# Patient Record
Sex: MALE | URBAN - METROPOLITAN AREA
[De-identification: names, ages, dates, MRNs, and addresses within clinical notes are randomized per-mention and may not be internally consistent; named-entity substitution may affect disease eponyms.]

---

## 2020-08-27 ENCOUNTER — APPOINTMENT (RX ONLY)
Dept: URBAN - METROPOLITAN AREA CLINIC 11 | Facility: CLINIC | Age: 50
Setting detail: DERMATOLOGY
End: 2020-08-27

## 2020-08-27 DIAGNOSIS — L40.3 PUSTULOSIS PALMARIS ET PLANTARIS: ICD-10-CM

## 2020-08-27 PROCEDURE — ? KOH PREP

## 2020-08-27 PROCEDURE — ? PRESCRIPTION

## 2020-08-27 PROCEDURE — ? COUNSELING

## 2020-08-27 PROCEDURE — 87220 TISSUE EXAM FOR FUNGI: CPT

## 2020-08-27 PROCEDURE — 99202 OFFICE O/P NEW SF 15 MIN: CPT

## 2020-08-27 RX ORDER — CLOBETASOL PROPIONATE 0.5 MG/G
CREAM TOPICAL BID
Qty: 1 | Refills: 3 | Status: ERX | COMMUNITY
Start: 2020-08-27

## 2020-08-27 RX ADMIN — CLOBETASOL PROPIONATE: 0.5 CREAM TOPICAL at 00:00

## 2020-08-27 ASSESSMENT — LOCATION DETAILED DESCRIPTION DERM
LOCATION DETAILED: RIGHT PLANTAR FOREFOOT OVERLYING 2ND METATARSAL
LOCATION DETAILED: RIGHT PLANTAR FOREFOOT OVERLYING 3RD METATARSAL
LOCATION DETAILED: RIGHT PLANTAR FOREFOOT OVERLYING 2ND METATARSAL
LOCATION DETAILED: RIGHT INSTEP

## 2020-08-27 ASSESSMENT — LOCATION ZONE DERM
LOCATION ZONE: FEET
LOCATION ZONE: FEET

## 2020-08-27 ASSESSMENT — PGA PSORIASIS: PGA PSORIASIS 2020: MODERATE

## 2020-08-27 ASSESSMENT — LOCATION SIMPLE DESCRIPTION DERM
LOCATION SIMPLE: RIGHT PLANTAR SURFACE
LOCATION SIMPLE: RIGHT PLANTAR SURFACE

## 2021-07-11 ENCOUNTER — HOSPITAL ENCOUNTER (EMERGENCY)
Facility: HOSPITAL | Age: 51
Discharge: HOME | End: 2021-07-11
Attending: EMERGENCY MEDICINE
Payer: MEDICAID

## 2021-07-11 VITALS
DIASTOLIC BLOOD PRESSURE: 79 MMHG | RESPIRATION RATE: 18 BRPM | SYSTOLIC BLOOD PRESSURE: 118 MMHG | BODY MASS INDEX: 20.99 KG/M2 | HEART RATE: 99 BPM | OXYGEN SATURATION: 97 % | WEIGHT: 155 LBS | HEIGHT: 72 IN | TEMPERATURE: 98 F

## 2021-07-11 DIAGNOSIS — R56.9 SEIZURE: Primary | ICD-10-CM

## 2021-07-11 DIAGNOSIS — Z91.148 NONCOMPLIANCE WITH MEDICATION TREATMENT DUE TO UNDERUSE OF MEDICATION: ICD-10-CM

## 2021-07-11 LAB — PHENYTOIN SERPL-MCNC: <0.1 UG/ML (ref 10–20)

## 2021-07-11 PROCEDURE — 96365 THER/PROPH/DIAG IV INF INIT: CPT

## 2021-07-11 PROCEDURE — 80177 DRUG SCRN QUAN LEVETIRACETAM: CPT | Performed by: EMERGENCY MEDICINE

## 2021-07-11 PROCEDURE — 80185 ASSAY OF PHENYTOIN TOTAL: CPT | Performed by: EMERGENCY MEDICINE

## 2021-07-11 PROCEDURE — 0031A HC IMMUNIZ ADMIN, SARS-COV-2 COVID-19 VACC, 5X10VP/0.5ML: CPT | Performed by: EMERGENCY MEDICINE

## 2021-07-11 PROCEDURE — 91303 PHARM REV CODE 636 W HCPCS: CPT | Performed by: EMERGENCY MEDICINE

## 2021-07-11 PROCEDURE — 63600175 PHARM REV CODE 636 W HCPCS: Performed by: EMERGENCY MEDICINE

## 2021-07-11 PROCEDURE — 96372 THER/PROPH/DIAG INJ SC/IM: CPT

## 2021-07-11 PROCEDURE — 99284 EMERGENCY DEPT VISIT MOD MDM: CPT | Mod: 25

## 2021-07-11 RX ORDER — PHENYTOIN 125 MG/5ML
352 SUSPENSION ORAL
Status: DISCONTINUED | OUTPATIENT
Start: 2021-07-11 | End: 2021-07-11

## 2021-07-11 RX ORDER — LEVETIRACETAM 500 MG/1
500 TABLET ORAL 2 TIMES DAILY
COMMUNITY
End: 2021-07-11 | Stop reason: SDUPTHER

## 2021-07-11 RX ORDER — LEVETIRACETAM 10 MG/ML
1000 INJECTION INTRAVASCULAR ONCE
Status: COMPLETED | OUTPATIENT
Start: 2021-07-11 | End: 2021-07-11

## 2021-07-11 RX ORDER — LEVETIRACETAM 10 MG/ML
1000 INJECTION INTRAVASCULAR ONCE
Status: DISCONTINUED | OUTPATIENT
Start: 2021-07-11 | End: 2021-07-11

## 2021-07-11 RX ORDER — LEVETIRACETAM 500 MG/1
500 TABLET ORAL 2 TIMES DAILY
Qty: 180 TABLET | Refills: 0 | Status: SHIPPED | OUTPATIENT
Start: 2021-07-11 | End: 2021-10-07

## 2021-07-11 RX ADMIN — LEVETIRACETAM INJECTION 1000 MG: 10 INJECTION INTRAVENOUS at 04:07

## 2021-07-11 RX ADMIN — JNJ-78436735 0.5 ML: 50000000000 SUSPENSION INTRAMUSCULAR at 05:07

## 2021-07-12 ENCOUNTER — TELEPHONE (OUTPATIENT)
Dept: NEUROLOGY | Facility: CLINIC | Age: 51
End: 2021-07-12

## 2021-07-13 ENCOUNTER — TELEPHONE (OUTPATIENT)
Dept: NEUROLOGY | Facility: CLINIC | Age: 51
End: 2021-07-13

## 2021-07-14 LAB — LEVETIRACETAM SERPL-MCNC: <1 UG/ML (ref 3–60)

## 2021-09-16 ENCOUNTER — TELEPHONE (OUTPATIENT)
Dept: NEUROLOGY | Facility: CLINIC | Age: 51
End: 2021-09-16

## 2021-10-07 ENCOUNTER — LAB VISIT (OUTPATIENT)
Dept: LAB | Facility: HOSPITAL | Age: 51
End: 2021-10-07
Attending: PSYCHIATRY & NEUROLOGY
Payer: MEDICAID

## 2021-10-07 ENCOUNTER — OFFICE VISIT (OUTPATIENT)
Dept: NEUROLOGY | Facility: CLINIC | Age: 51
End: 2021-10-07
Payer: MEDICAID

## 2021-10-07 VITALS
WEIGHT: 154.56 LBS | BODY MASS INDEX: 20.94 KG/M2 | HEART RATE: 99 BPM | SYSTOLIC BLOOD PRESSURE: 119 MMHG | HEIGHT: 72 IN | DIASTOLIC BLOOD PRESSURE: 81 MMHG

## 2021-10-07 DIAGNOSIS — G40.019 LOCALIZATION-RELATED (FOCAL) (PARTIAL) IDIOPATHIC EPILEPSY AND EPILEPTIC SYNDROMES WITH SEIZURES OF LOCALIZED ONSET, INTRACTABLE, WITHOUT STATUS EPILEPTICUS: ICD-10-CM

## 2021-10-07 DIAGNOSIS — Z91.148 POOR COMPLIANCE WITH MEDICATION: ICD-10-CM

## 2021-10-07 DIAGNOSIS — G40.319 GENERALIZED EPILEPSY, INTRACTABLE: Primary | ICD-10-CM

## 2021-10-07 PROCEDURE — 99204 PR OFFICE/OUTPT VISIT, NEW, LEVL IV, 45-59 MIN: ICD-10-PCS | Mod: S$PBB,,, | Performed by: PSYCHIATRY & NEUROLOGY

## 2021-10-07 PROCEDURE — 99213 OFFICE O/P EST LOW 20 MIN: CPT | Mod: PBBFAC | Performed by: PSYCHIATRY & NEUROLOGY

## 2021-10-07 PROCEDURE — 99204 OFFICE O/P NEW MOD 45 MIN: CPT | Mod: S$PBB,,, | Performed by: PSYCHIATRY & NEUROLOGY

## 2021-10-07 PROCEDURE — 99999 PR PBB SHADOW E&M-EST. PATIENT-LVL III: ICD-10-PCS | Mod: PBBFAC,,, | Performed by: PSYCHIATRY & NEUROLOGY

## 2021-10-07 PROCEDURE — 36415 COLL VENOUS BLD VENIPUNCTURE: CPT | Performed by: PSYCHIATRY & NEUROLOGY

## 2021-10-07 PROCEDURE — 80177 DRUG SCRN QUAN LEVETIRACETAM: CPT | Performed by: PSYCHIATRY & NEUROLOGY

## 2021-10-07 PROCEDURE — 99999 PR PBB SHADOW E&M-EST. PATIENT-LVL III: CPT | Mod: PBBFAC,,, | Performed by: PSYCHIATRY & NEUROLOGY

## 2021-10-07 RX ORDER — LEVETIRACETAM 750 MG/1
1500 TABLET ORAL 2 TIMES DAILY
Qty: 360 TABLET | Refills: 3 | Status: SHIPPED | OUTPATIENT
Start: 2021-10-07 | End: 2022-02-11 | Stop reason: SDUPTHER

## 2021-10-11 LAB — LEVETIRACETAM SERPL-MCNC: 13.2 UG/ML (ref 3–60)

## 2021-12-30 ENCOUNTER — IMMUNIZATION (OUTPATIENT)
Dept: INTERNAL MEDICINE | Facility: CLINIC | Age: 51
End: 2021-12-30
Payer: MEDICAID

## 2021-12-30 DIAGNOSIS — Z23 NEED FOR VACCINATION: Primary | ICD-10-CM

## 2021-12-30 PROCEDURE — 91303 COVID-19,VECTOR-NR,RS-AD26,PF,0.5 ML DOSE VACCINE (JANSSEN): CPT | Mod: PBBFAC

## 2021-12-30 PROCEDURE — 0034A COVID-19,VECTOR-NR,RS-AD26,PF,0.5 ML DOSE VACCINE (JANSSEN): CPT | Mod: PBBFAC,CV19

## 2022-01-18 ENCOUNTER — TELEPHONE (OUTPATIENT)
Dept: NEUROLOGY | Facility: CLINIC | Age: 52
End: 2022-01-18
Payer: MEDICAID

## 2022-01-18 NOTE — TELEPHONE ENCOUNTER
----- Message from Becky Hui MD PhD sent at 1/18/2022  3:24 PM CST -----  Contact: Patient  Please schedule.     Becky Dumas     ----- Message -----  From: Radha Andrade  Sent: 1/18/2022  12:32 PM CST  To: Korina Pena Staff    Type:   Appointment Request      Name of Caller:Patient   When is the first available appointment?no appointment generated  Patient stated message already send and no response   Would the patient rather a call back or a response via [facility]? Call back   Best Call Back Number:[phone] or [phone]  Additional Information: Please assist     Patient stated on waiting on call for appointment

## 2022-02-11 ENCOUNTER — OFFICE VISIT (OUTPATIENT)
Dept: NEUROLOGY | Facility: CLINIC | Age: 52
End: 2022-02-11
Payer: MEDICAID

## 2022-02-11 ENCOUNTER — LAB VISIT (OUTPATIENT)
Dept: LAB | Facility: HOSPITAL | Age: 52
End: 2022-02-11
Attending: PSYCHIATRY & NEUROLOGY
Payer: MEDICAID

## 2022-02-11 VITALS
WEIGHT: 157.75 LBS | HEART RATE: 95 BPM | SYSTOLIC BLOOD PRESSURE: 131 MMHG | BODY MASS INDEX: 21.37 KG/M2 | DIASTOLIC BLOOD PRESSURE: 93 MMHG | HEIGHT: 72 IN

## 2022-02-11 DIAGNOSIS — G40.319 GENERALIZED EPILEPSY, INTRACTABLE: ICD-10-CM

## 2022-02-11 DIAGNOSIS — G40.319 GENERALIZED EPILEPSY, INTRACTABLE: Primary | ICD-10-CM

## 2022-02-11 DIAGNOSIS — Z91.148 POOR COMPLIANCE WITH MEDICATION: ICD-10-CM

## 2022-02-11 LAB
ERYTHROCYTE [DISTWIDTH] IN BLOOD BY AUTOMATED COUNT: 13.2 % (ref 11.5–14.5)
HCT VFR BLD AUTO: 45.2 % (ref 40–54)
HGB BLD-MCNC: 14.9 G/DL (ref 14–18)
MCH RBC QN AUTO: 33.3 PG (ref 27–31)
MCHC RBC AUTO-ENTMCNC: 33 G/DL (ref 32–36)
MCV RBC AUTO: 101 FL (ref 82–98)
PLATELET # BLD AUTO: 231 K/UL (ref 150–450)
PMV BLD AUTO: 9.8 FL (ref 9.2–12.9)
RBC # BLD AUTO: 4.47 M/UL (ref 4.6–6.2)
WBC # BLD AUTO: 5.85 K/UL (ref 3.9–12.7)

## 2022-02-11 PROCEDURE — 3080F PR MOST RECENT DIASTOLIC BLOOD PRESSURE >= 90 MM HG: ICD-10-PCS | Mod: CPTII,,, | Performed by: PSYCHIATRY & NEUROLOGY

## 2022-02-11 PROCEDURE — 99213 OFFICE O/P EST LOW 20 MIN: CPT | Mod: PBBFAC | Performed by: PSYCHIATRY & NEUROLOGY

## 2022-02-11 PROCEDURE — 3008F BODY MASS INDEX DOCD: CPT | Mod: CPTII,,, | Performed by: PSYCHIATRY & NEUROLOGY

## 2022-02-11 PROCEDURE — 99999 PR PBB SHADOW E&M-EST. PATIENT-LVL III: CPT | Mod: PBBFAC,,, | Performed by: PSYCHIATRY & NEUROLOGY

## 2022-02-11 PROCEDURE — 3075F PR MOST RECENT SYSTOLIC BLOOD PRESS GE 130-139MM HG: ICD-10-PCS | Mod: CPTII,,, | Performed by: PSYCHIATRY & NEUROLOGY

## 2022-02-11 PROCEDURE — 1159F MED LIST DOCD IN RCRD: CPT | Mod: CPTII,,, | Performed by: PSYCHIATRY & NEUROLOGY

## 2022-02-11 PROCEDURE — 3080F DIAST BP >= 90 MM HG: CPT | Mod: CPTII,,, | Performed by: PSYCHIATRY & NEUROLOGY

## 2022-02-11 PROCEDURE — 85027 COMPLETE CBC AUTOMATED: CPT | Performed by: PSYCHIATRY & NEUROLOGY

## 2022-02-11 PROCEDURE — 1160F PR REVIEW ALL MEDS BY PRESCRIBER/CLIN PHARMACIST DOCUMENTED: ICD-10-PCS | Mod: CPTII,,, | Performed by: PSYCHIATRY & NEUROLOGY

## 2022-02-11 PROCEDURE — 1160F RVW MEDS BY RX/DR IN RCRD: CPT | Mod: CPTII,,, | Performed by: PSYCHIATRY & NEUROLOGY

## 2022-02-11 PROCEDURE — 36415 COLL VENOUS BLD VENIPUNCTURE: CPT | Performed by: PSYCHIATRY & NEUROLOGY

## 2022-02-11 PROCEDURE — 3075F SYST BP GE 130 - 139MM HG: CPT | Mod: CPTII,,, | Performed by: PSYCHIATRY & NEUROLOGY

## 2022-02-11 PROCEDURE — 3008F PR BODY MASS INDEX (BMI) DOCUMENTED: ICD-10-PCS | Mod: CPTII,,, | Performed by: PSYCHIATRY & NEUROLOGY

## 2022-02-11 PROCEDURE — 99215 OFFICE O/P EST HI 40 MIN: CPT | Mod: S$PBB,,, | Performed by: PSYCHIATRY & NEUROLOGY

## 2022-02-11 PROCEDURE — 80053 COMPREHEN METABOLIC PANEL: CPT | Performed by: PSYCHIATRY & NEUROLOGY

## 2022-02-11 PROCEDURE — 1159F PR MEDICATION LIST DOCUMENTED IN MEDICAL RECORD: ICD-10-PCS | Mod: CPTII,,, | Performed by: PSYCHIATRY & NEUROLOGY

## 2022-02-11 PROCEDURE — 99999 PR PBB SHADOW E&M-EST. PATIENT-LVL III: ICD-10-PCS | Mod: PBBFAC,,, | Performed by: PSYCHIATRY & NEUROLOGY

## 2022-02-11 PROCEDURE — 99215 PR OFFICE/OUTPT VISIT, EST, LEVL V, 40-54 MIN: ICD-10-PCS | Mod: S$PBB,,, | Performed by: PSYCHIATRY & NEUROLOGY

## 2022-02-11 PROCEDURE — 80177 DRUG SCRN QUAN LEVETIRACETAM: CPT | Performed by: PSYCHIATRY & NEUROLOGY

## 2022-02-11 RX ORDER — ZONISAMIDE 100 MG/1
300 CAPSULE ORAL NIGHTLY
Qty: 270 CAPSULE | Refills: 3 | Status: SHIPPED | OUTPATIENT
Start: 2022-02-11 | End: 2022-05-31

## 2022-02-11 RX ORDER — LEVETIRACETAM 750 MG/1
1500 TABLET ORAL 2 TIMES DAILY
Qty: 360 TABLET | Refills: 3 | Status: SHIPPED | OUTPATIENT
Start: 2022-02-11 | End: 2022-08-31

## 2022-02-11 NOTE — PROGRESS NOTES
Name: Irvin Meza Sr.  MRN:[id number]   CSN: [id number]  Date of service: 2/11/2022  Age:52 y.o.   Gender:male   Referring Physician/Service: Becky Hui MD PhD  [address]  [id number]  [place] [address]   The patient is here today with:  self    Neurology Clinic:  Follow-up Visit    CHIEF COMPLAINT:  Epilepsy    Interval Events/ROS 2/11/2022:  Seizure 1/18/2022, trying to get out. Black out. This year 1 seizure. Keppra is okay. 5-6 seizure over the whole year in 2021 despite compliance. Taking keppra. Caught a seizure in his sleep.  Head laceration.  Now a helmet has been ordered to protect him during sleep.  Covid vaccination and boosted. Trying to get bone density test. Pain especially in the back. Mood okay. Not working. Only way to get job is if he lied. Trying to getting disability. Letter written. Needs to get a . Close up vision is blurry. No eye doctor. Vimpat twice daily but he did not have any refills on this medications so did not continue it.  Lacosamide caused poor appetite. Otherwise, no fever, no cold symptoms, no new weakness, no chest pain, no shortness of breath, no nausea, no vomiting, no diarrhea, no constipation, no tingling/numbness, no problems walking, no mood issues.    HPI 10/7/2021:     Age of first seizure: born with seizures, seizures as an infant   Seizure Risk Factors: no family history of seizures, term, unclear about details of birth but does not think he stayed in the hospital long, no CNS infections, several head strikes with seizures only   Time of Last Seizure: week ago   # of lifetime Seizures: 1000+  Frequency of Seizures:  At most 2 in a day, 1-2 seizures per month  Seizure Triggers:  Overheating, strong smells like perfume and gasoline, lights and too much TV, etoh  Injuries/Hospitalization for seizures? Head strike, tongue bite, fractured arms, lost teeth (had to get teeth pulled)  Bone Health: Dexa at [facility] but does not know results      Auras:  Headache  "and "weird feeling" -> space out, will start stuttering     Seizure Events:   1. Generalized convusions, Banging head back, eye roll back, does not want anyone to touch him, will throw people around, urinary incontinence, tired afterwards, couple days to get back to normal, sore afterwards with a persistent headache   2. Black out, wander off, walk across the stress, act funny, can be combative, does not want to be touched, very tired    Current AED/SEs:  1. Levetiracetam 1000 mg twice daily SE still having seizures but frequently misses doses    Previous AED/SEs or reason for DC.   1. Phenytoin SE dental issues     EEG: [facility] at [facility] and [facility] -> 3-4 Hz discharges per care everywhere tab    CT 2011: normal     Other Allergies: none      AED compliance, adherence: misses some doses     Recent Labs   Lab 07/11/21  1627 10/07/21  1645   Levetiracetam Lvl <1.0 13.2   Phenytoin Lvl <0.1 L  --         ROS 10/7/2021:  Vaccination. Problems with taste after seizures. Otherwise, denies headache at this moment, loss of vision, blurred vision, diplopia, dysarthria, dysphagia, lightheadedness, vertigo, tinnitus or hearing difficulty. Denies difficulties producing or comprehending speech.  Denies focal weakness, numbness, paresthesias. Denies difficulty with gait. Denies cough, shortness of breath.  Denies chest pain or tightness, palpitations.  Denies nausea, vomiting, diarrhea, constipation or abdominal pain.  No bowel or bladder incontinence or retention.  No falls.       EXAM:   - Vitals: BP (!) 131/93   Pulse 95   Ht 6' (1.829 m)   Wt 71.6 kg (157 lb 11.8 oz)   BMI 21.39 kg/m²    - General: Awake, cooperative, NAD.  - HEENT: NC/AT, edentulous  - Neck: Supple  - Pulmonary: no increased WOB  - Cardiac: well perfused   - Abdomen: soft, nontender, nondistended  - Extremities: no edema  - Skin: no rashes or lesions noted.     NEURO EXAM:   - Mental Status: Awake, alert, oriented x 3. Able to relate history without " difficulty. Attentive to examiner. Language is fluent with intact repetition and comprehension. Normal prosody. There were no paraphasic errors. Able to name both high and low frequency objects. Speech was mildly dysarthric (edentulous). Able to follow both midline and appendicular commands. There was no evidence of apraxia or neglect.    - Cranial Nerves:  VFF to confrontation. EOMI without nystagmus. No facial droop. Hearing intact to room voice. 5/5 strength in trapezii and SCM bilaterally.     - Motor: Normal bulk and tone throughout. No pronator drift bilaterally. No adventitious movements such as tremor or asterixis noted.     Delt Bic Tri WrE WrF  FFl FE IO IP Quad Ham TA Gastroc   R   5     5    5    5    5        5   5    5   5    5        5     5      5            L   5      5    5   5    5        5    5   5    5    5       5     5      5              - Sensory: No deficits to light touch. No extinction to DSS.  - Coordination: No dysmetria on FNF   - Gait: Good initiation. Narrow-based, normal stride and arm swing. Romberg negative.    PLAN: 52-year-old man with intractable generalized epilepsy (3-4 hz generalized discharges on outside EEG report, no tracing available for review) with frequent generalized convulsions and dyscognitive episodes with bizarre behaviors.  Breakthrough episodes despite levetiracetam 1500 mg twice daily.  Adjuncts with zonisamide 300 mg at night.  Labs/level today.  DEXA.  Discussed SUDEP.  Follow up in about 6 months (around 8/11/2022).     Patient Instructions   You came to Epilepsy Clinic because of your seizure disorder.  Your seizures are partially controlled on Keppra 1500 mg twice daily.  I would like you to start a new medication in addition to the Keppra called zonisamide.  Please follow schedule below:    02/11/2022  Start zonisamide 100 mg at night  Continue Keppra 1500 mg twice daily    02/18/2022  Increase zonisamide to 200 mg at night  Continue Keppra 1500 mg twice  daily    02/25/2022  Increase zonisamide to 300 mg at night  Continue Keppra 1500 mg twice daily    Do not miss any doses of your medication. If you do miss a dose, take it as soon as you remember even if that means doubling up on the dose. Please get a lab test to check out the blood level of your medication. Please get regular sleep. Go to sleep at the same time and wake up at the same time every day. People with epilepsy require more sleep than people without epilepsy.  Sleeping 10-12 hours a day can be normal for a person with epilepsy.  Give yourself the time you need to get enough sleep. Every seizure you have makes it harder to prevent the next seizure. Epilepsy is associated with SUDEP, or sudden unexpected death in epilepsy.  The risk is significantly higher if your convulsive seizures are not well controlled. For more information, check out these websites: https://www.epilepsyallianceamerica.org/, www.[place]-epilepsy.org, www.womenandepilepsy.org.  Please schedule your DEXA scan to look for evidence of bone breakdown.    Per [place] law, no episodes of loss of consciousness for 6 months before you may drive.  Please avoid dangerous situations.  For example, no baths/pools by yourself, no heights, no power tools.  Please wear a bike helmet.  If you have a single breakthrough seizure, please patient portal me or call the office and we will decide the next steps. If you have multiple seizures in a row and do not return back to baseline, 911 needs to be called.     Return to clinic in 6 months or sooner with issues.  Please patient portal with any questions or concerns.    Becky Hui MD PhD  Neurology-Epilepsy  [facility].         Problem List Items Addressed This Visit     Generalized epilepsy, intractable - Primary    Relevant Medications    zonisamide (ZONEGRAN) 100 MG Cap    levETIRAcetam (KEPPRA) 750 MG Tab    Other Relevant Orders    Levetiracetam level    DXA Bone Density Spine And  Hip    CBC Without Differential (Completed)    Comprehensive Metabolic Panel    Poor compliance with medication          More than 50% of the 37 minutes spent with the patient (as well as family/caregiver(s) was spent on face-to-face counseling. Total time spent on encounter:  45 minutes    Disclaimer: This note has been generated using voice-recognition software. There may be typographical errors that were missed during proof-reading.     LABS:  Recent Labs   Lab 02/11/22  1619   WBC 5.85   Hemoglobin 14.9   Hematocrit 45.2   Platelets 231       Recent Labs   Lab 07/11/21  1627 10/07/21  1645   Levetiracetam Lvl <1.0 13.2   Phenytoin Lvl <0.1 L  --         IMAGING:  Recent imaging is personally reviewed with the patient.    None in the system.    PAST MEDICAL HISTORY:   Active Ambulatory Problems     Diagnosis Date Noted    Generalized epilepsy, intractable 10/07/2021    Poor compliance with medication 10/07/2021     Resolved Ambulatory Problems     Diagnosis Date Noted    No Resolved Ambulatory Problems     Past Medical History:   Diagnosis Date    Seizures         PAST SURGICAL HISTORY: History reviewed. No pertinent surgical history.     ALLERGIES: Patient has no known allergies.   CURRENT MEDICATIONS:   Current Outpatient Medications   Medication Sig Dispense Refill    levETIRAcetam (KEPPRA) 750 MG Tab Take 2 tablets (1,500 mg total) by mouth 2 (two) times daily. 360 tablet 3    zonisamide (ZONEGRAN) 100 MG Cap Take 3 capsules (300 mg total) by mouth every evening. 270 capsule 3     No current facility-administered medications for this visit.        FAMILY HISTORY: History reviewed. No pertinent family history.      SOCIAL HISTORY:   Social History     Socioeconomic History    Marital status: Single   Tobacco Use    Smoking status: Current Every Day Smoker     Packs/day: 1.00    Smokeless tobacco: Never Used   Substance and Sexual Activity    Alcohol use: Yes     Comment: every now and then    Drug  use: No         Questions and concerns raised by the patient and family/care-giver(s) were addressed and they indicated understanding of everything discussed and agreed to plans as above.    Becky Hui MD PhD  Neurology-Epilepsy  [facility].

## 2022-02-11 NOTE — PATIENT INSTRUCTIONS
You came to Epilepsy Clinic because of your seizure disorder.  Your seizures are partially controlled on Keppra 1500 mg twice daily.  I would like you to start a new medication in addition to the Keppra called zonisamide.  Please follow schedule below:    02/11/2022  Start zonisamide 100 mg at night  Continue Keppra 1500 mg twice daily    02/18/2022  Increase zonisamide to 200 mg at night  Continue Keppra 1500 mg twice daily    02/25/2022  Increase zonisamide to 300 mg at night  Continue Keppra 1500 mg twice daily    Do not miss any doses of your medication. If you do miss a dose, take it as soon as you remember even if that means doubling up on the dose. Please get a lab test to check out the blood level of your medication. Please get regular sleep. Go to sleep at the same time and wake up at the same time every day. People with epilepsy require more sleep than people without epilepsy.  Sleeping 10-12 hours a day can be normal for a person with epilepsy.  Give yourself the time you need to get enough sleep. Every seizure you have makes it harder to prevent the next seizure. Epilepsy is associated with SUDEP, or sudden unexpected death in epilepsy.  The risk is significantly higher if your convulsive seizures are not well controlled. For more information, check out these websites: https://www.epilepsyallianceamerica.org/, www.[place]-epilepsy.org, www.womenandepilepsy.org.  Please schedule your DEXA scan to look for evidence of bone breakdown.    Per [place] law, no episodes of loss of consciousness for 6 months before you may drive.  Please avoid dangerous situations.  For example, no baths/pools by yourself, no heights, no power tools.  Please wear a bike helmet.  If you have a single breakthrough seizure, please patient portal me or call the office and we will decide the next steps. If you have multiple seizures in a row and do not return back to baseline, 911 needs to be called.     Return to clinic in 6 months or  sooner with issues.  Please patient portal with any questions or concerns.    Becky Hui MD PhD  Neurology-Epilepsy  [facility].

## 2022-02-12 LAB
ALBUMIN SERPL BCP-MCNC: 4.2 G/DL (ref 3.5–5.2)
ALP SERPL-CCNC: 86 U/L (ref 55–135)
ALT SERPL W/O P-5'-P-CCNC: 10 U/L (ref 10–44)
ANION GAP SERPL CALC-SCNC: 9 MMOL/L (ref 8–16)
AST SERPL-CCNC: 17 U/L (ref 10–40)
BILIRUB SERPL-MCNC: 0.4 MG/DL (ref 0.1–1)
BUN SERPL-MCNC: 12 MG/DL (ref 6–20)
CALCIUM SERPL-MCNC: 9.8 MG/DL (ref 8.7–10.5)
CHLORIDE SERPL-SCNC: 103 MMOL/L (ref 95–110)
CO2 SERPL-SCNC: 29 MMOL/L (ref 23–29)
CREAT SERPL-MCNC: 1 MG/DL (ref 0.5–1.4)
EST. GFR  (AFRICAN AMERICAN): >60 ML/MIN/1.73 M^2
EST. GFR  (NON AFRICAN AMERICAN): >60 ML/MIN/1.73 M^2
GLUCOSE SERPL-MCNC: 64 MG/DL (ref 70–110)
POTASSIUM SERPL-SCNC: 4.4 MMOL/L (ref 3.5–5.1)
PROT SERPL-MCNC: 7.7 G/DL (ref 6–8.4)
SODIUM SERPL-SCNC: 141 MMOL/L (ref 136–145)

## 2022-02-14 LAB — LEVETIRACETAM SERPL-MCNC: 36.8 UG/ML (ref 3–60)

## 2022-02-18 ENCOUNTER — TELEPHONE (OUTPATIENT)
Dept: NEUROLOGY | Facility: CLINIC | Age: 52
End: 2022-02-18
Payer: MEDICAID

## 2022-02-18 NOTE — TELEPHONE ENCOUNTER
Called and left a message for  regarding bone scan that  has placed a order. I stated to give us a call back regarding this matter.    Scheduled for March 18 at 1:40PM to hold a slot

## 2022-02-18 NOTE — TELEPHONE ENCOUNTER
----- Message from Becky Hui MD PhD sent at 2/11/2022  6:55 PM CST -----  Please call him to schedule a DEXA scan. The order is in.     Thank you,   Becky

## 2022-04-04 ENCOUNTER — DOCUMENTATION ONLY (OUTPATIENT)
Dept: NEUROLOGY | Facility: HOSPITAL | Age: 52
End: 2022-04-04
Payer: MEDICAID

## 2022-04-04 NOTE — PROGRESS NOTES
Faxed from [facility] - encounter date 04/03/2022 -> breakthrough seizure, no other details.  He was administered levetiracetam 1000 mg IV followed by 1000 mg twice daily.  He is supposed to be on levetiracetam 1500 mg twice daily plus zonisamide 300 mg at night.  Not sure where the miscommunication comes from.  We will call the patient to set up follow-up.    Becky Hui MD PhD  Neurology-Epilepsy  [facility].

## 2022-04-05 ENCOUNTER — TELEPHONE (OUTPATIENT)
Dept: NEUROLOGY | Facility: CLINIC | Age: 52
End: 2022-04-05
Payer: MEDICAID

## 2022-04-05 NOTE — TELEPHONE ENCOUNTER
----- Message from Becky Hui MD PhD sent at 4/4/2022  6:30 PM CDT -----  We got faxed a face sheet from [facility].  Looks like he had a breakthrough seizure.  Please call to schedule follow-up with Jesica.    Thank you,   Becky

## 2022-04-11 ENCOUNTER — HOSPITAL ENCOUNTER (OUTPATIENT)
Dept: RADIOLOGY | Facility: CLINIC | Age: 52
Discharge: HOME | End: 2022-04-11
Attending: PSYCHIATRY & NEUROLOGY
Payer: MEDICAID

## 2022-04-11 DIAGNOSIS — G40.319 GENERALIZED EPILEPSY, INTRACTABLE: ICD-10-CM

## 2022-04-11 PROCEDURE — 77080 DXA BONE DENSITY AXIAL: CPT | Mod: TC

## 2022-04-11 PROCEDURE — 77080 DXA BONE DENSITY AXIAL: CPT | Mod: 26,,, | Performed by: INTERNAL MEDICINE

## 2022-04-11 PROCEDURE — 77080 DEXA BONE DENSITY SPINE HIP: ICD-10-PCS | Mod: 26,,, | Performed by: INTERNAL MEDICINE

## 2022-04-27 ENCOUNTER — TELEPHONE (OUTPATIENT)
Dept: NEUROLOGY | Facility: CLINIC | Age: 52
End: 2022-04-27
Payer: MEDICAID

## 2022-05-10 ENCOUNTER — TELEPHONE (OUTPATIENT)
Dept: NEUROLOGY | Facility: CLINIC | Age: 52
End: 2022-05-10
Payer: MEDICAID

## 2022-05-10 NOTE — TELEPHONE ENCOUNTER
----- Message from Becky Hui MD PhD sent at 4/4/2022  6:30 PM CDT -----  We got faxed a face sheet from [facility].  Looks like he had a breakthrough seizure.  Please call to schedule follow-up with Jesica.    Thank you,   Bceky

## 2022-05-31 ENCOUNTER — LAB VISIT (OUTPATIENT)
Dept: LAB | Facility: HOSPITAL | Age: 52
End: 2022-05-31
Payer: MEDICAID

## 2022-05-31 ENCOUNTER — OFFICE VISIT (OUTPATIENT)
Dept: NEUROLOGY | Facility: CLINIC | Age: 52
End: 2022-05-31
Payer: MEDICAID

## 2022-05-31 VITALS
HEIGHT: 73 IN | DIASTOLIC BLOOD PRESSURE: 78 MMHG | BODY MASS INDEX: 20.81 KG/M2 | HEART RATE: 94 BPM | SYSTOLIC BLOOD PRESSURE: 115 MMHG

## 2022-05-31 DIAGNOSIS — G40.319 GENERALIZED EPILEPSY, INTRACTABLE: ICD-10-CM

## 2022-05-31 DIAGNOSIS — G40.319 GENERALIZED EPILEPSY, INTRACTABLE: Primary | ICD-10-CM

## 2022-05-31 PROCEDURE — 3008F BODY MASS INDEX DOCD: CPT | Mod: CPTII,,,

## 2022-05-31 PROCEDURE — 3078F DIAST BP <80 MM HG: CPT | Mod: CPTII,,,

## 2022-05-31 PROCEDURE — 99212 OFFICE O/P EST SF 10 MIN: CPT | Mod: PBBFAC

## 2022-05-31 PROCEDURE — 80203 DRUG SCREEN QUANT ZONISAMIDE: CPT

## 2022-05-31 PROCEDURE — 36415 COLL VENOUS BLD VENIPUNCTURE: CPT

## 2022-05-31 PROCEDURE — 80177 DRUG SCRN QUAN LEVETIRACETAM: CPT

## 2022-05-31 PROCEDURE — 99999 PR PBB SHADOW E&M-EST. PATIENT-LVL II: CPT | Mod: PBBFAC,,,

## 2022-05-31 PROCEDURE — 3008F PR BODY MASS INDEX (BMI) DOCUMENTED: ICD-10-PCS | Mod: CPTII,,,

## 2022-05-31 PROCEDURE — 3078F PR MOST RECENT DIASTOLIC BLOOD PRESSURE < 80 MM HG: ICD-10-PCS | Mod: CPTII,,,

## 2022-05-31 PROCEDURE — 99999 PR PBB SHADOW E&M-EST. PATIENT-LVL II: ICD-10-PCS | Mod: PBBFAC,,,

## 2022-05-31 PROCEDURE — 99215 PR OFFICE/OUTPT VISIT, EST, LEVL V, 40-54 MIN: ICD-10-PCS | Mod: S$PBB,,,

## 2022-05-31 PROCEDURE — 3074F PR MOST RECENT SYSTOLIC BLOOD PRESSURE < 130 MM HG: ICD-10-PCS | Mod: CPTII,,,

## 2022-05-31 PROCEDURE — 3074F SYST BP LT 130 MM HG: CPT | Mod: CPTII,,,

## 2022-05-31 PROCEDURE — 99215 OFFICE O/P EST HI 40 MIN: CPT | Mod: S$PBB,,,

## 2022-05-31 RX ORDER — ZONISAMIDE 100 MG/1
600 CAPSULE ORAL DAILY
Qty: 540 CAPSULE | Refills: 3 | Status: SHIPPED | OUTPATIENT
Start: 2022-05-31 | End: 2023-08-04

## 2022-05-31 NOTE — PATIENT INSTRUCTIONS
You came to Epilepsy Clinic because of your seizure disorder. Your seizures are partially controlled on keppra 1500mg twice daily and zonisamide 200mg daily.     Because you continue to have breakthrough seizures, I would like to increase the zonisamide to 600mg (6 tablets) daily. Please follow the schedule below in order to increase this medication safely:    5/31/2022   - continue keppra 1500mg in the morning and 1500mg at night   - increase zonisamide to 200mg (2 tablets) in the morning and 200mg (2 tablets) at night     In one week, 6/07/2022  - continue keppra 1500mg in the morning and 1500mg at night   - increase zonisamide to 200mg (2 tablets) in the morning and 300mg (3 tablets) at night     In one week, 6/14/2022  - continue keppra 1500mg in the morning and 1500mg at night   - increase zonisamide to 300mg (3 tablets) in the morning and 300mg (3 tablets) at night     You can take 3 tablets of the zonisamide in the morning and 3 tablets at night or you can take all 6 tablets at night before bed.     Do not miss any doses of your medication. If you do miss a dose, take it as soon as you remember even if that means doubling up on the dose. I recommend getting a pill minder to help with remembering to take your medication. Please get a lab test to check out the blood level of your medication. Please get regular sleep. Go to sleep at the same time and wake up at the same time every day. People with epilepsy require more sleep than people without epilepsy.  Sleeping 10-12 hours a day can be normal for a person with epilepsy.  Give yourself the time you need to get enough sleep. Every seizure you have makes it harder to prevent the next seizure. Epilepsy is associated with SUDEP, or sudden unexpected death in epilepsy.  The risk is significantly higher if your convulsive seizures are not well controlled. For more information, check out these websites: https://www.epilepsyallianceamerica.org/, www.[place]-epilepsy.org,  www.womenandepilepsy.org.      Per [place] law, no episodes of loss of consciousness for 6 months before you may drive.  Please avoid dangerous situations.  For example, no baths/pools by yourself, no heights, no power tools.  Please wear a bike helmet.  If you have a single breakthrough seizure, please patient portal me or call the office and we will decide the next steps. If you have multiple seizures in a row and do not return back to baseline, 911 needs to be called.     Return to clinic in 6 months or sooner with issues.  Please patient portal with any questions or concerns.    Maria R Blankenship PA-C   Neurology-Epilepsy  [facility]

## 2022-05-31 NOTE — PROGRESS NOTES
Name: Irvin Meza  MRN:[id number]   CSN: [id number]  Date of service: 5/31/2022  Age:52 y.o.   Gender:male   Referring Physician/Service: No referring provider defined for this encounter.   The patient is here today with:  self    Neurology Clinic:  Follow-up Visit    CHIEF COMPLAINT:  Epilepsy    Interval Events/ROS 5/31/2022:    Breakthrough event 04/03/2022 despite levetiracetam 1500mg BID and zonisamide 100mg BID (originally prescribed 300mg qhs but patient states he tries not to take a lot of medication). No missed doses. No reported side effects. No identifiable trigger of recent breakthrough event though does mention he is not sleeping well at night, not sure why. Not working. Trying to work with  regarding disability. Denies feeling depressed though mentions he does get angry a lot. No SI or HI. Otherwise, no fever, no cold symptoms, no headache, no changes in vision, no new weakness, no chest pain, no shortness of breath, no nausea, no vomiting, no diarrhea, no constipation, no tingling/numbness, no problems walking.    Interval Events/ROS 2/11/2022:  Seizure 1/18/2022, trying to get out. Black out. This year 1 seizure. Keppra is okay. 5-6 seizure over the whole year in 2021 despite compliance. Taking keppra. Caught a seizure in his sleep.  Head laceration.  Now a helmet has been ordered to protect him during sleep.  Covid vaccination and boosted. Trying to get bone density test. Pain especially in the back. Mood okay. Not working. Only way to get job is if he lied. Trying to getting disability. Letter written. Needs to get a . Close up vision is blurry. No eye doctor. Vimpat twice daily but he did not have any refills on this medications so did not continue it.  Lacosamide caused poor appetite. Otherwise, no fever, no cold symptoms, no new weakness, no chest pain, no shortness of breath, no nausea, no vomiting, no diarrhea, no constipation, no tingling/numbness, no problems walking, no mood  "issues.    HPI 10/7/2021:     Age of first seizure: born with seizures, seizures as an infant   Seizure Risk Factors: no family history of seizures, term, unclear about details of birth but does not think he stayed in the hospital long, no CNS infections, several head strikes with seizures only   Time of Last Seizure: week ago   # of lifetime Seizures: 1000+  Frequency of Seizures:  At most 2 in a day, 1-2 seizures per month  Seizure Triggers:  Overheating, strong smells like perfume and gasoline, lights and too much TV, etoh  Injuries/Hospitalization for seizures? Head strike, tongue bite, fractured arms, lost teeth (had to get teeth pulled)  Bone Health: Dexa at [facility] but does not know results      Auras:  Headache and "weird feeling" -> space out, will start stuttering     Seizure Events:   1. Generalized convusions, Banging head back, eye roll back, does not want anyone to touch him, will throw people around, urinary incontinence, tired afterwards, couple days to get back to normal, sore afterwards with a persistent headache   2. Black out, wander off, walk across the stress, act funny, can be combative, does not want to be touched, very tired    Current AED/SEs:  1. Levetiracetam 1000 mg twice daily SE still having seizures but frequently misses doses    Previous AED/SEs or reason for DC.   1. Phenytoin SE dental issues     EEG: [facility] at [facility] -> 3-4 Hz discharges per care everywhere tab    CT 2011: normal     Other Allergies: none      AED compliance, adherence: misses some doses     Recent Labs   Lab 07/11/21  1627 10/07/21  1645 02/11/22  1619   Levetiracetam Lvl <1.0 13.2 36.8   Phenytoin Lvl <0.1 L  --   --         ROS 10/7/2021:  Vaccination. Problems with taste after seizures. Otherwise, denies headache at this moment, loss of vision, blurred vision, diplopia, dysarthria, dysphagia, lightheadedness, vertigo, tinnitus or hearing difficulty. Denies difficulties producing or comprehending speech.  " "Denies focal weakness, numbness, paresthesias. Denies difficulty with gait. Denies cough, shortness of breath.  Denies chest pain or tightness, palpitations.  Denies nausea, vomiting, diarrhea, constipation or abdominal pain.  No bowel or bladder incontinence or retention.  No falls.       EXAM:   - Vitals: /78   Pulse 94   Ht 6' 1" (1.854 m)   BMI 20.81 kg/m²    - General: Awake, cooperative, NAD.  - HEENT: NC/AT, edentulous  - Neck: Supple  - Pulmonary: no increased WOB  - Cardiac: well perfused   - Abdomen: soft, nontender, nondistended  - Extremities: no edema  - Skin: no rashes or lesions noted.     NEURO EXAM:   - Mental Status: Awake, alert, oriented x 3. Able to relate history without difficulty. Attentive to examiner. Language is fluent with intact repetition and comprehension. Normal prosody. There were no paraphasic errors. Able to name both high and low frequency objects. Speech was mildly dysarthric (edentulous). Able to follow both midline and appendicular commands. There was no evidence of apraxia or neglect.    - Cranial Nerves:  VFF to confrontation. EOMI without nystagmus. No facial droop. Hearing intact to room voice. 5/5 strength in trapezii and SCM bilaterally.     - Motor: Normal bulk and tone throughout. No pronator drift bilaterally. No adventitious movements such as tremor or asterixis noted.     Delt Bic Tri WrE WrF  FFl FE IO IP Quad Ham TA Gastroc   R   5     5    5    5    5        5   5    5   5    5        5     5      5            L   5      5    5   5    5        5    5   5    5    5       5     5      5              - Sensory: No deficits to light touch. No extinction to DSS.  - Coordination: No dysmetria on FNF   - Gait: Good initiation. Narrow-based, normal stride and arm swing. Romberg negative.    PLAN: 52-year-old man with intractable generalized epilepsy (3-4 hz generalized discharges on outside EEG report, no tracing available for review) with frequent generalized " convulsions and dyscognitive episodes with bizarre behaviors.  Breakthrough episodes despite levetiracetam 1500 mg twice daily and zonisamide 200mg daily (though originally prescribed 300mg daily). Increase zonisamide to 600mg daily via up titration over three weeks. Levels today. Normal bone mineral density on DEXA 02/2022. Follow up in about 6 months (around 11/30/2022).     Patient Instructions   You came to Epilepsy Clinic because of your seizure disorder. Your seizures are partially controlled on keppra 1500mg twice daily and zonisamide 200mg daily.     Because you continue to have breakthrough seizures, I would like to increase the zonisamide to 600mg (6 tablets) daily. Please follow the schedule below in order to increase this medication safely:    5/31/2022   - continue keppra 1500mg in the morning and 1500mg at night   - increase zonisamide to 200mg (2 tablets) in the morning and 200mg (2 tablets) at night     In one week, 6/07/2022  - continue keppra 1500mg in the morning and 1500mg at night   - increase zonisamide to 200mg (2 tablets) in the morning and 300mg (3 tablets) at night     In one week, 6/14/2022  - continue keppra 1500mg in the morning and 1500mg at night   - increase zonisamide to 300mg (3 tablets) in the morning and 300mg (3 tablets) at night     You can take 3 tablets of the zonisamide in the morning and 3 tablets at night or you can take all 6 tablets at night before bed.     Do not miss any doses of your medication. If you do miss a dose, take it as soon as you remember even if that means doubling up on the dose. I recommend getting a pill minder to help with remembering to take your medication. Please get a lab test to check out the blood level of your medication. Please get regular sleep. Go to sleep at the same time and wake up at the same time every day. People with epilepsy require more sleep than people without epilepsy.  Sleeping 10-12 hours a day can be normal for a person with  epilepsy.  Give yourself the time you need to get enough sleep. Every seizure you have makes it harder to prevent the next seizure. Epilepsy is associated with SUDEP, or sudden unexpected death in epilepsy.  The risk is significantly higher if your convulsive seizures are not well controlled. For more information, check out these websites: https://www.epilepsyalliance[place].org/, www.[place]-epilepsy.org, www.womenandepilepsy.org.      Per [place] law, no episodes of loss of consciousness for 6 months before you may drive.  Please avoid dangerous situations.  For example, no baths/pools by yourself, no heights, no power tools.  Please wear a bike helmet.  If you have a single breakthrough seizure, please patient portal me or call the office and we will decide the next steps. If you have multiple seizures in a row and do not return back to baseline, 911 needs to be called.     Return to clinic in 6 months or sooner with issues.  Please patient portal with any questions or concerns.    Maria R Blankenship PA-C   Neurology-Epilepsy  [facility]       Problem List Items Addressed This Visit        Neuro    Generalized epilepsy, intractable - Primary    Relevant Medications    zonisamide (ZONEGRAN) 100 MG Cap    Other Relevant Orders    Levetiracetam level    Zonisamide level          More than 50% of the 37 minutes spent with the patient (as well as family/caregiver(s) was spent on face-to-face counseling. Total time spent on encounter:  45 minutes    Disclaimer: This note has been generated using voice-recognition software. There may be typographical errors that were missed during proof-reading.     LABS:  Recent Labs   Lab 02/11/22  1619   WBC 5.85   Hemoglobin 14.9   Hematocrit 45.2   Platelets 231   Sodium 141   Potassium 4.4   BUN 12   Creatinine 1.0   eGFR if African American >60.0   eGFR if non  >60.0       Recent Labs   Lab 07/11/21  1627 10/07/21  1645 02/11/22  1619    Levetiracetam Lvl <1.0 13.2 36.8   Phenytoin Lvl <0.1 L  --   --         IMAGING:  Recent imaging is personally reviewed with the patient.    None in the system.    PAST MEDICAL HISTORY:   Active Ambulatory Problems     Diagnosis Date Noted    Generalized epilepsy, intractable 10/07/2021    Poor compliance with medication 10/07/2021     Resolved Ambulatory Problems     Diagnosis Date Noted    No Resolved Ambulatory Problems     Past Medical History:   Diagnosis Date    Seizures         PAST SURGICAL HISTORY: No past surgical history on file.     ALLERGIES: Patient has no known allergies.   CURRENT MEDICATIONS:   Current Outpatient Medications   Medication Sig Dispense Refill    levETIRAcetam (KEPPRA) 750 MG Tab Take 2 tablets (1,500 mg total) by mouth 2 (two) times daily. 360 tablet 3    zonisamide (ZONEGRAN) 100 MG Cap Take 3 capsules (300 mg total) by mouth every evening. 270 capsule 3     No current facility-administered medications for this visit.        FAMILY HISTORY: No family history on file.      SOCIAL HISTORY:   Social History     Socioeconomic History    Marital status: Single   Tobacco Use    Smoking status: Current Every Day Smoker     Packs/day: 1.00    Smokeless tobacco: Never Used   Substance and Sexual Activity    Alcohol use: Yes     Comment: every now and then    Drug use: No         Questions and concerns raised by the patient and family/care-giver(s) were addressed and they indicated understanding of everything discussed and agreed to plans as above.    Maria R Blankenship PA-C   Neurology-Epilepsy  [facility]    Collaborating physician, Dr. Becky Hui, was available during today's encounter.     I spent approximately 40 minutes on the day of this encounter preparing to see the patient, obtaining and reviewing history and results, performing a medically appropriate exam, counseling and educating the patient/family/caregiver, documenting clinical information,  coordinating care, and ordering medications, tests, procedures, and referrals.

## 2022-06-02 LAB — ZONISAMIDE SERPL-MCNC: 2.6 MCG/ML (ref 10–40)

## 2022-06-03 LAB — LEVETIRACETAM SERPL-MCNC: 21.7 UG/ML (ref 3–60)

## 2022-08-12 ENCOUNTER — TELEPHONE (OUTPATIENT)
Dept: NEUROLOGY | Facility: CLINIC | Age: 52
End: 2022-08-12
Payer: MEDICAID

## 2022-08-12 NOTE — TELEPHONE ENCOUNTER
----- Message from Yudy Light sent at 8/12/2022  9:04 AM CDT -----  Regarding: Appointment  Contact: [phone] or [phone]  Calling in regards to scheduling an appointment as soon as possible for epilepsy. [place]. Please call and schedule.

## 2022-08-31 ENCOUNTER — PATIENT MESSAGE (OUTPATIENT)
Dept: NEUROLOGY | Facility: CLINIC | Age: 52
End: 2022-08-31

## 2022-08-31 ENCOUNTER — OFFICE VISIT (OUTPATIENT)
Dept: NEUROLOGY | Facility: CLINIC | Age: 52
End: 2022-08-31
Payer: MEDICAID

## 2022-08-31 VITALS
HEIGHT: 73 IN | WEIGHT: 147 LBS | SYSTOLIC BLOOD PRESSURE: 119 MMHG | HEART RATE: 110 BPM | BODY MASS INDEX: 19.48 KG/M2 | DIASTOLIC BLOOD PRESSURE: 86 MMHG

## 2022-08-31 DIAGNOSIS — G89.29 CHRONIC NONINTRACTABLE HEADACHE, UNSPECIFIED HEADACHE TYPE: ICD-10-CM

## 2022-08-31 DIAGNOSIS — R51.9 CHRONIC NONINTRACTABLE HEADACHE, UNSPECIFIED HEADACHE TYPE: ICD-10-CM

## 2022-08-31 DIAGNOSIS — F06.30 MOOD DISORDER DUE TO MEDICAL CONDITION: ICD-10-CM

## 2022-08-31 DIAGNOSIS — G40.319 GENERALIZED EPILEPSY, INTRACTABLE: Primary | ICD-10-CM

## 2022-08-31 PROCEDURE — 99999 PR PBB SHADOW E&M-EST. PATIENT-LVL III: CPT | Mod: PBBFAC,,,

## 2022-08-31 PROCEDURE — 3079F PR MOST RECENT DIASTOLIC BLOOD PRESSURE 80-89 MM HG: ICD-10-PCS | Mod: CPTII,,,

## 2022-08-31 PROCEDURE — 3008F BODY MASS INDEX DOCD: CPT | Mod: CPTII,,,

## 2022-08-31 PROCEDURE — 99213 OFFICE O/P EST LOW 20 MIN: CPT | Mod: PBBFAC

## 2022-08-31 PROCEDURE — 3008F PR BODY MASS INDEX (BMI) DOCUMENTED: ICD-10-PCS | Mod: CPTII,,,

## 2022-08-31 PROCEDURE — 3079F DIAST BP 80-89 MM HG: CPT | Mod: CPTII,,,

## 2022-08-31 PROCEDURE — 3074F SYST BP LT 130 MM HG: CPT | Mod: CPTII,,,

## 2022-08-31 PROCEDURE — 3074F PR MOST RECENT SYSTOLIC BLOOD PRESSURE < 130 MM HG: ICD-10-PCS | Mod: CPTII,,,

## 2022-08-31 PROCEDURE — 99215 OFFICE O/P EST HI 40 MIN: CPT | Mod: S$PBB,,,

## 2022-08-31 PROCEDURE — 99215 PR OFFICE/OUTPT VISIT, EST, LEVL V, 40-54 MIN: ICD-10-PCS | Mod: S$PBB,,,

## 2022-08-31 PROCEDURE — 99999 PR PBB SHADOW E&M-EST. PATIENT-LVL III: ICD-10-PCS | Mod: PBBFAC,,,

## 2022-08-31 RX ORDER — SERTRALINE HYDROCHLORIDE 50 MG/1
50 TABLET, FILM COATED ORAL DAILY
Qty: 90 TABLET | Refills: 3 | Status: SHIPPED | OUTPATIENT
Start: 2022-08-31 | End: 2023-08-04

## 2022-08-31 RX ORDER — LEVETIRACETAM 500 MG/1
1500 TABLET ORAL 2 TIMES DAILY
Qty: 540 TABLET | Refills: 3 | Status: SHIPPED | OUTPATIENT
Start: 2022-08-31 | End: 2023-08-04 | Stop reason: SDUPTHER

## 2022-08-31 NOTE — PROGRESS NOTES
Name: Irvin Meza  MRN:[id number]   CSN: [id number]  Date of service: 8/31/2022  Age:52 y.o.   Gender:male   Referring Physician/Service: No referring provider defined for this encounter.   The patient is here today with:  self    Neurology Clinic:  Follow-up Visit    CHIEF COMPLAINT:  Epilepsy    Interval Events/ROS 8/31/2022:    Current ASM/SEs: patient is taking levetiracetam 500mg TID and zonisamide 100mg BID; no reported side effects    Breakthrough seizures/events: generalized convulsion 07/03/2022, prior to that 04/2022; blackout/dyscognitive episodes 3-4 in the past two months  Driving: no  Folic acid: no  Sleep: sleeps well if head is not hurting   Mood: okay    Frequent headaches 3-4 days per week for the past 2 months described as a throbbing pain to L side of head and behind L eye. Associated photophobia. Denies N/V. Relieved w/ excedrin migraine. Otherwise, no fever, no cold symptoms, no changes in vision, no new weakness, no chest pain, no shortness of breath, no nausea, no vomiting, no diarrhea, no constipation, no tingling/numbness, no problems walking.    Recent Labs   Lab 07/11/21  1627 10/07/21  1645 02/11/22  1619 05/31/22  1125   Levetiracetam Lvl <1.0 13.2 36.8 21.7   Zonisamide  --   --   --  2.6 L   Phenytoin Lvl <0.1 L  --   --   --         Interval Events/ROS 5/31/2022:    Breakthrough event 04/03/2022 despite levetiracetam 1500mg BID and zonisamide 100mg BID (originally prescribed 300mg qhs but patient states he tries not to take a lot of medication). No missed doses. No reported side effects. No identifiable trigger of recent breakthrough event though does mention he is not sleeping well at night, not sure why. Not working. Trying to work with  regarding disability. Denies feeling depressed though mentions he does get angry a lot. No SI or HI. Otherwise, no fever, no cold symptoms, no headache, no changes in vision, no new weakness, no chest pain, no shortness of breath, no nausea,  "no vomiting, no diarrhea, no constipation, no tingling/numbness, no problems walking.    Interval Events/ROS 2/11/2022:  Seizure 1/18/2022, trying to get out. Black out. This year 1 seizure. Keppra is okay. 5-6 seizure over the whole year in 2021 despite compliance. Taking keppra. Caught a seizure in his sleep.  Head laceration.  Now a helmet has been ordered to protect him during sleep.  Covid vaccination and boosted. Trying to get bone density test. Pain especially in the back. Mood okay. Not working. Only way to get job is if he lied. Trying to getting disability. Letter written. Needs to get a . Close up vision is blurry. No eye doctor. Vimpat twice daily but he did not have any refills on this medications so did not continue it.  Lacosamide caused poor appetite. Otherwise, no fever, no cold symptoms, no new weakness, no chest pain, no shortness of breath, no nausea, no vomiting, no diarrhea, no constipation, no tingling/numbness, no problems walking, no mood issues.    HPI 10/7/2021:     Age of first seizure: born with seizures, seizures as an infant   Seizure Risk Factors: no family history of seizures, term, unclear about details of birth but does not think he stayed in the hospital long, no CNS infections, several head strikes with seizures only   Time of Last Seizure: week ago   # of lifetime Seizures: 1000+  Frequency of Seizures:  At most 2 in a day, 1-2 seizures per month  Seizure Triggers:  Overheating, strong smells like perfume and gasoline, lights and too much TV, etoh  Injuries/Hospitalization for seizures? Head strike, tongue bite, fractured arms, lost teeth (had to get teeth pulled)  Bone Health: Dexa at [facility] but does not know results      Auras:  Headache and "weird feeling" -> space out, will start stuttering     Seizure Events:   1. Generalized convusions, Banging head back, eye roll back, does not want anyone to touch him, will throw people around, urinary incontinence, tired afterwards, " "couple days to get back to normal, sore afterwards with a persistent headache   2. Black out, wander off, walk across the stress, act funny, can be combative, does not want to be touched, very tired    Current AED/SEs:  1. Levetiracetam 1000 mg twice daily SE still having seizures but frequently misses doses    Previous AED/SEs or reason for DC.   1. Phenytoin SE dental issues     EEG: [facility] -> 3-4 Hz discharges per care everywhere tab    CT 2011: normal     Other Allergies: none      AED compliance, adherence: misses some doses     Recent Labs   Lab 07/11/21  1627 10/07/21  1645 02/11/22  1619 05/31/22  1125   Levetiracetam Lvl <1.0 13.2 36.8 21.7   Zonisamide  --   --   --  2.6 L   Phenytoin Lvl <0.1 L  --   --   --         ROS 10/7/2021:  Vaccination. Problems with taste after seizures. Otherwise, denies headache at this moment, loss of vision, blurred vision, diplopia, dysarthria, dysphagia, lightheadedness, vertigo, tinnitus or hearing difficulty. Denies difficulties producing or comprehending speech.  Denies focal weakness, numbness, paresthesias. Denies difficulty with gait. Denies cough, shortness of breath.  Denies chest pain or tightness, palpitations.  Denies nausea, vomiting, diarrhea, constipation or abdominal pain.  No bowel or bladder incontinence or retention.  No falls.       EXAM:   - Vitals: /86   Pulse 110   Ht 6' 1" (1.854 m)   Wt 66.7 kg (147 lb)   BMI 19.39 kg/m²    - General: Awake, cooperative, NAD.  - HEENT: NC/AT, edentulous  - Neck: Supple  - Pulmonary: no increased WOB  - Cardiac: well perfused   - Abdomen: soft, nontender, nondistended  - Extremities: no edema  - Skin: no rashes or lesions noted.     NEURO EXAM:   - Mental Status: Awake, alert, oriented x 3. Able to relate history without difficulty. Attentive to examiner. Language is fluent with intact repetition and comprehension. Normal prosody. There were no paraphasic errors. Able to name both high and " low frequency objects. Speech was mildly dysarthric (edentulous). Able to follow both midline and appendicular commands. There was no evidence of apraxia or neglect.    - Cranial Nerves:  VFF to confrontation. EOMI. No facial droop. Hearing intact to room voice. 5/5 strength in trapezii and SCM bilaterally.     - Motor: Normal bulk and tone throughout. No pronator drift bilaterally. No adventitious movements such as tremor or asterixis noted.     Delt Bic Tri WrE WrF  FFl FE IO IP Quad Ham TA Gastroc   R   5     5    5    5    5        5   5    5   5    5        5     5      5            L   5      5    5   5    5        5    5   5    5    5       5     5      5              - Sensory: No deficits to light touch. No extinction to DSS.  - Coordination: No dysmetria on FNF   - Gait: Good initiation. Narrow-based, normal stride and arm swing. Romberg negative.    PLAN: 52-year-old man with intractable generalized epilepsy (3-4 hz generalized discharges on outside EEG report, no tracing available for review) with frequent generalized convulsions and dyscognitive episodes with bizarre behaviors. Continued breakthrough events in the setting of unintentional improper medication compliance. Confirmed with patient he should take the following medication regimen: levetiracetam 1500mg BID and zonisamide 600mg qhs. Levels at next visit. Trial of sertraline 50mg daily for mood. Phone number for MyChart tech support provided. All questions and concerns are addressed at this time. Follow up in about 3 months (around 11/30/2022).     Patient Instructions   You came to Epilepsy Clinic because of your seizure disorder. Your seizures on not currently controlled on keppra 500mg three times daily and zonisamide 100mg twice daily.     Please take the following:  - keppra 3 tablets (1500mg) in the morning and keppra 3 tablets (1500mg) at night  - zonisamide 6 tablets (600mg) at night    Do not miss any doses of medication. If a dose of  medication is missed, take it as soon as it is remembered even if that means doubling up on the dose. Get regular sleep. Go to sleep at the same time and wake up at the same time every day. People with epilepsy require more sleep than people without epilepsy.  Sleeping 10-12 hours a day can be normal for a person with epilepsy.  Every seizure makes it harder to prevent the next seizure. Epilepsy is associated with SUDEP, or sudden unexpected death in epilepsy.  The risk is significantly higher if convulsive seizures are not well controlled. For more information, check out these websites: https://www.epilepsy.com/, https://www.epilepsyallianceamerica.org/, www.[place]-epilepsy.org, www.womenandepilepsy.org.      I would like you to try a new medication called sertraline to help with mood. Please take 1/2 tablet for 14 days then increase to 1 tablet daily. You can take this medication in the morning or at night, with or without food. You will have some side effects as you start this medication like GI upset, changes in appetite, changes in sleep, possibly more anxiety. Please push through, your body will adjust.  Please do a self inventory in two months to see how you are doing with the medication and give me an update through the portal at that time.     You should be taking 13 pills total per day. The zonisamide should also help with headaches. You will probably feel a little crummy for a couple of weeks but please push through. Reach out over the portal with any problems.     Per [place] law, no episodes of loss of consciousness for 6 months before driving.  Avoid dangerous situations.  For example, no baths/pools alone, no heights, no power tools.  Wear a bike helmet.  If breakthrough seizures occur that are different in character, frequency, or duration from normal episodes, please patient portal me or call the office and we will decide the next steps. If multiple seizures occur in a row without return back to  baseline, 911 needs to be called.     Return to clinic in 3 months or sooner with issues.  Please patient portal with any questions or concerns.    Maria R Blankenship PA-C   Neurology-Epilepsy  [facility]     Problem List Items Addressed This Visit          Neuro    Generalized epilepsy, intractable - Primary    Relevant Medications    levETIRAcetam (KEPPRA) 500 MG Tab     Other Visit Diagnoses       Mood disorder due to medical condition        Relevant Medications    sertraline (ZOLOFT) 50 MG tablet    Chronic nonintractable headache, unspecified headache type                Disclaimer: This note has been generated using voice-recognition software. There may be typographical errors that were missed during proof-reading.     LABS:  Recent Labs   Lab 02/11/22  1619   WBC 5.85   Hemoglobin 14.9   Hematocrit 45.2   Platelets 231   Sodium 141   Potassium 4.4   BUN 12   Creatinine 1.0   eGFR if African American >60.0   eGFR if non  >60.0       Recent Labs   Lab 07/11/21  1627 10/07/21  1645 02/11/22  1619 05/31/22  1125   Levetiracetam Lvl <1.0 13.2 36.8 21.7   Zonisamide  --   --   --  2.6 L   Phenytoin Lvl <0.1 L  --   --   --         IMAGING:  Recent imaging is personally reviewed with the patient.    None in the system.    PAST MEDICAL HISTORY:   Active Ambulatory Problems     Diagnosis Date Noted    Generalized epilepsy, intractable 10/07/2021    Poor compliance with medication 10/07/2021     Resolved Ambulatory Problems     Diagnosis Date Noted    No Resolved Ambulatory Problems     Past Medical History:   Diagnosis Date    Seizures         PAST SURGICAL HISTORY: No past surgical history on file.     ALLERGIES: Patient has no known allergies.   CURRENT MEDICATIONS:   Current Outpatient Medications   Medication Sig Dispense Refill    levETIRAcetam (KEPPRA) 750 MG Tab Take 2 tablets (1,500 mg total) by mouth 2 (two) times daily. 360 tablet 3    zonisamide (ZONEGRAN) 100 MG Cap Take  6 capsules (600 mg total) by mouth once daily. 540 capsule 3     No current facility-administered medications for this visit.        FAMILY HISTORY: No family history on file.      SOCIAL HISTORY:   Social History     Socioeconomic History    Marital status: Single   Tobacco Use    Smoking status: Every Day     Packs/day: 1.00     Types: Cigarettes    Smokeless tobacco: Never   Substance and Sexual Activity    Alcohol use: Yes     Comment: every now and then    Drug use: No         Questions and concerns raised by the patient and family/care-giver(s) were addressed and they indicated understanding of everything discussed and agreed to plans as above.    Maria R Blankenship PA-C   Neurology-Epilepsy  [facility]    Collaborating physician, Dr. Becky Hui, was available during today's encounter.     I spent approximately 40 minutes on the day of this encounter preparing to see the patient, obtaining and reviewing history and results, performing a medically appropriate exam, counseling and educating the patient/family/caregiver, documenting clinical information, coordinating care, and ordering medications, tests, procedures, and referrals.

## 2022-08-31 NOTE — PATIENT INSTRUCTIONS
You came to Epilepsy Clinic because of your seizure disorder. Your seizures on not currently controlled on keppra 500mg three times daily and zonisamide 100mg twice daily.     Please take the following:  - keppra 3 tablets (1500mg) in the morning and keppra 3 tablets (1500mg) at night  - zonisamide 6 tablets (600mg) at night    Do not miss any doses of medication. If a dose of medication is missed, take it as soon as it is remembered even if that means doubling up on the dose. Get regular sleep. Go to sleep at the same time and wake up at the same time every day. People with epilepsy require more sleep than people without epilepsy.  Sleeping 10-12 hours a day can be normal for a person with epilepsy.  Every seizure makes it harder to prevent the next seizure. Epilepsy is associated with SUDEP, or sudden unexpected death in epilepsy.  The risk is significantly higher if convulsive seizures are not well controlled. For more information, check out these websites: https://www.epilepsy.com/, https://www.epilepsyallianceamerica.org/, www.[place]-epilepsy.org, www.womenandepilepsy.org.      I would like you to try a new medication called sertraline to help with mood. Please take 1/2 tablet for 14 days then increase to 1 tablet daily. You can take this medication in the morning or at night, with or without food. You will have some side effects as you start this medication like GI upset, changes in appetite, changes in sleep, possibly more anxiety. Please push through, your body will adjust.  Please do a self inventory in two months to see how you are doing with the medication and give me an update through the portal at that time.     You should be taking 13 pills total per day. The zonisamide should also help with headaches. You will probably feel a little crummy for a couple of weeks but please push through. Reach out over the portal with any problems.     Per [place] law, no episodes of loss of consciousness for 6 months  before driving.  Avoid dangerous situations.  For example, no baths/pools alone, no heights, no power tools.  Wear a bike helmet.  If breakthrough seizures occur that are different in character, frequency, or duration from normal episodes, please patient portal me or call the office and we will decide the next steps. If multiple seizures occur in a row without return back to baseline, 911 needs to be called.     Return to clinic in 3 months or sooner with issues.  Please patient portal with any questions or concerns.    Maria R Blankenship PA-C   Neurology-Epilepsy  [facility]

## 2023-07-05 ENCOUNTER — TELEPHONE (OUTPATIENT)
Dept: NEUROLOGY | Facility: CLINIC | Age: 53
End: 2023-07-05
Payer: MEDICAID

## 2023-07-05 NOTE — TELEPHONE ENCOUNTER
Called patient to schedule follow up appointment with Maria R and Dr. Hui Friday August 4th at 10:00 am.

## 2023-07-05 NOTE — TELEPHONE ENCOUNTER
----- Message from Zak Lr sent at 7/5/2023 12:00 PM CDT -----  Regarding: Daphnie appt  Contact: @ [phone] or @ [phone]  Pt called in wanting to daphnie an appt with the provider. Last time the pt saw the provider was in 2022. Pt was just discharged from the hospital today. Pt also has a referral scanned in the . Please call pt to discuss further.

## 2023-08-04 ENCOUNTER — OFFICE VISIT (OUTPATIENT)
Dept: NEUROLOGY | Facility: CLINIC | Age: 53
End: 2023-08-04
Payer: MEDICAID

## 2023-08-04 VITALS
HEART RATE: 97 BPM | DIASTOLIC BLOOD PRESSURE: 97 MMHG | WEIGHT: 162.94 LBS | SYSTOLIC BLOOD PRESSURE: 145 MMHG | BODY MASS INDEX: 21.49 KG/M2

## 2023-08-04 DIAGNOSIS — F06.30 MOOD DISORDER DUE TO MEDICAL CONDITION: ICD-10-CM

## 2023-08-04 DIAGNOSIS — G89.29 CHRONIC NONINTRACTABLE HEADACHE, UNSPECIFIED HEADACHE TYPE: ICD-10-CM

## 2023-08-04 DIAGNOSIS — G40.319 GENERALIZED EPILEPSY, INTRACTABLE: Primary | ICD-10-CM

## 2023-08-04 DIAGNOSIS — R51.9 CHRONIC NONINTRACTABLE HEADACHE, UNSPECIFIED HEADACHE TYPE: ICD-10-CM

## 2023-08-04 DIAGNOSIS — Z91.148 POOR COMPLIANCE WITH MEDICATION: ICD-10-CM

## 2023-08-04 PROCEDURE — 99417 PR PROLONGED SVC, OUTPT, W/WO DIRECT PT CONTACT,  EA ADDTL 15 MIN: ICD-10-PCS | Mod: S$PBB,,,

## 2023-08-04 PROCEDURE — 99999 PR PBB SHADOW E&M-EST. PATIENT-LVL III: CPT | Mod: PBBFAC,,,

## 2023-08-04 PROCEDURE — 99215 PR OFFICE/OUTPT VISIT, EST, LEVL V, 40-54 MIN: ICD-10-PCS | Mod: S$PBB,,,

## 2023-08-04 PROCEDURE — 99417 PROLNG OP E/M EACH 15 MIN: CPT | Mod: S$PBB,,,

## 2023-08-04 PROCEDURE — 99999 PR PBB SHADOW E&M-EST. PATIENT-LVL III: ICD-10-PCS | Mod: PBBFAC,,,

## 2023-08-04 PROCEDURE — 3077F PR MOST RECENT SYSTOLIC BLOOD PRESSURE >= 140 MM HG: ICD-10-PCS | Mod: CPTII,,,

## 2023-08-04 PROCEDURE — 3008F BODY MASS INDEX DOCD: CPT | Mod: CPTII,,,

## 2023-08-04 PROCEDURE — 3077F SYST BP >= 140 MM HG: CPT | Mod: CPTII,,,

## 2023-08-04 PROCEDURE — 99215 OFFICE O/P EST HI 40 MIN: CPT | Mod: S$PBB,,,

## 2023-08-04 PROCEDURE — 3008F PR BODY MASS INDEX (BMI) DOCUMENTED: ICD-10-PCS | Mod: CPTII,,,

## 2023-08-04 PROCEDURE — 99213 OFFICE O/P EST LOW 20 MIN: CPT | Mod: PBBFAC

## 2023-08-04 PROCEDURE — 3080F PR MOST RECENT DIASTOLIC BLOOD PRESSURE >= 90 MM HG: ICD-10-PCS | Mod: CPTII,,,

## 2023-08-04 PROCEDURE — 3080F DIAST BP >= 90 MM HG: CPT | Mod: CPTII,,,

## 2023-08-04 RX ORDER — LEVETIRACETAM 500 MG/1
1500 TABLET ORAL 3 TIMES DAILY
Qty: 810 TABLET | Refills: 3 | Status: ON HOLD | OUTPATIENT
Start: 2023-08-04 | End: 2023-10-12 | Stop reason: HOSPADM

## 2023-08-04 RX ORDER — ZONISAMIDE 100 MG/1
200 CAPSULE ORAL NIGHTLY
Qty: 180 CAPSULE | Refills: 3 | Status: SHIPPED | OUTPATIENT
Start: 2023-08-04 | End: 2023-12-11

## 2023-08-04 NOTE — PROGRESS NOTES
Name: Irvin Meza  MRN:[id number]   CSN: [id number]  Date of service: 8/4/2023  Age:53 y.o.   Gender:male   Referring Physician/Service: No referring provider defined for this encounter.   The patient is here today with: self    Neurology Clinic:  Follow-up Visit    CHIEF COMPLAINT:  Epilepsy    Interval Events/ROS 8/4/2023:    Current ASM/SEs: levetiracetam 1500mg TID, does not recall taking zonisamide; states mom and daughter help remind him to take his meds; mood SE  Breakthrough seizures/events: brief 'black out' spells daily; last convulsive event was 7/10/2023 that occurred in the setting of missed medication per EMR -> evaluated at [facility]  Driving: no  Sleep: not good  Mood: depressed, passive suicidal ideation, not taking sertraline due to GI side effects     Headaches. Otherwise, no fever, no cold symptoms, no changes in vision, no new weakness, no chest pain, no shortness of breath, no nausea, no vomiting, no diarrhea, no constipation, no tingling/numbness, no problems walking.    Recent Labs   Lab 07/11/21  1627 10/07/21  1645 02/11/22  1619 05/31/22  1125   Levetiracetam Lvl <1.0 13.2 36.8 21.7   Zonisamide  --   --   --  2.6 L   Phenytoin Lvl <0.1 L  --   --   --       Interval Events/ROS 8/31/2022:    Current ASM/SEs: patient is taking levetiracetam 500mg TID and zonisamide 100mg BID; no reported side effects    Breakthrough seizures/events: generalized convulsion 07/03/2022, prior to that 04/2022; blackout/dyscognitive episodes 3-4 in the past two months  Driving: no  Folic acid: no  Sleep: sleeps well if head is not hurting   Mood: okay    Frequent headaches 3-4 days per week for the past 2 months described as a throbbing pain to L side of head and behind L eye. Associated photophobia. Denies N/V. Relieved w/ excedrin migraine. Otherwise, no fever, no cold symptoms, no changes in vision, no new weakness, no chest pain, no shortness of breath, no nausea, no vomiting, no diarrhea, no  constipation, no tingling/numbness, no problems walking.     Interval Events/ROS 5/31/2022:    Breakthrough event 04/03/2022 despite levetiracetam 1500mg BID and zonisamide 100mg BID (originally prescribed 300mg qhs but patient states he tries not to take a lot of medication). No missed doses. No reported side effects. No identifiable trigger of recent breakthrough event though does mention he is not sleeping well at night, not sure why. Not working. Trying to work with  regarding disability. Denies feeling depressed though mentions he does get angry a lot. No SI or HI. Otherwise, no fever, no cold symptoms, no headache, no changes in vision, no new weakness, no chest pain, no shortness of breath, no nausea, no vomiting, no diarrhea, no constipation, no tingling/numbness, no problems walking.    Interval Events/ROS 2/11/2022:  Seizure 1/18/2022, trying to get out. Black out. This year 1 seizure. Keppra is okay. 5-6 seizure over the whole year in 2021 despite compliance. Taking keppra. Caught a seizure in his sleep.  Head laceration.  Now a helmet has been ordered to protect him during sleep.  Covid vaccination and boosted. Trying to get bone density test. Pain especially in the back. Mood okay. Not working. Only way to get job is if he lied. Trying to getting disability. Letter written. Needs to get a . Close up vision is blurry. No eye doctor. Vimpat twice daily but he did not have any refills on this medications so did not continue it.  Lacosamide caused poor appetite. Otherwise, no fever, no cold symptoms, no new weakness, no chest pain, no shortness of breath, no nausea, no vomiting, no diarrhea, no constipation, no tingling/numbness, no problems walking, no mood issues.    HPI 10/7/2021:     Age of first seizure: born with seizures, seizures as an infant   Seizure Risk Factors: no family history of seizures, term, unclear about details of birth but does not think he stayed in the hospital long, no  "CNS infections, several head strikes with seizures only   Time of Last Seizure: week ago   # of lifetime Seizures: 1000+  Frequency of Seizures:  At most 2 in a day, 1-2 seizures per month  Seizure Triggers:  Overheating, strong smells like perfume and gasoline, lights and too much TV, etoh  Injuries/Hospitalization for seizures? Head strike, tongue bite, fractured arms, lost teeth (had to get teeth pulled)  Bone Health: Dexa at [facility] but does not know results      Auras:  Headache and "weird feeling" -> space out, will start stuttering     Seizure Events:   1. Generalized convusions, Banging head back, eye roll back, does not want anyone to touch him, will throw people around, urinary incontinence, tired afterwards, couple days to get back to normal, sore afterwards with a persistent headache   2. Black out, wander off, walk across the stress, act funny, can be combative, does not want to be touched, very tired    Current AED/SEs:  1. Levetiracetam 1000 mg twice daily SE still having seizures but frequently misses doses    Previous AED/SEs or reason for DC.   1. Phenytoin SE dental issues     EEG: done at [facility] and [facility] -> 3-4 Hz discharges per care everywhere tab    CT 2011: normal     Other Allergies: none      AED compliance, adherence: misses some doses     ROS 10/7/2021:  Vaccination. Problems with taste after seizures. Otherwise, denies headache at this moment, loss of vision, blurred vision, diplopia, dysarthria, dysphagia, lightheadedness, vertigo, tinnitus or hearing difficulty. Denies difficulties producing or comprehending speech.  Denies focal weakness, numbness, paresthesias. Denies difficulty with gait. Denies cough, shortness of breath.  Denies chest pain or tightness, palpitations.  Denies nausea, vomiting, diarrhea, constipation or abdominal pain.  No bowel or bladder incontinence or retention.  No falls.       EXAM:   - Vitals: BP (!) 145/97   Pulse 97   Wt 73.9 kg (162 lb 14.7 oz)   BMI 21.49 " kg/m²    - General: Awake, cooperative, NAD.  - HEENT: NC/AT, edentulous  - Neck: Supple  - Pulmonary: no increased WOB  - Cardiac: well perfused   - Abdomen: soft, nontender, nondistended  - Extremities: no edema  - Skin: no rashes or lesions noted.     NEURO EXAM:   - Mental Status: Awake, alert, oriented x 3. Able to relate history without difficulty. Attentive to examiner. Language is fluent with intact repetition and comprehension. Normal prosody. There were no paraphasic errors. Able to name both high and low frequency objects. Speech was mildly dysarthric (edentulous). Able to follow both midline and appendicular commands. There was no evidence of apraxia or neglect.    - Cranial Nerves:  VFF to confrontation. EOMI. No facial droop. Hearing intact to room voice. 5/5 strength in trapezii and SCM bilaterally.     - Motor: Normal bulk and tone throughout. No pronator drift bilaterally. No adventitious movements such as tremor or asterixis noted.     Delt Bic Tri WrE WrF  FFl FE IO IP Quad Ham TA Gastroc   R   5     5    5    5    5        5   5    5   5    5        5     5      5            L   5      5    5   5    5        5    5   5    5    5       5     5      5              - Sensory: No deficits to light touch. No extinction to DSS.  - Coordination: No dysmetria on FNF   - Gait: Good initiation. Narrow-based, normal stride and arm swing. Romberg negative.    PLAN: 53-year-old man with intractable generalized epilepsy (3-4 hz generalized discharges on outside EEG report, no tracing available for review) with frequent generalized convulsions and dyscognitive episodes with bizarre behaviors. Continued breakthrough events primarily in the setting of taking medications improperly. He is currently taking levetiracetam 1500mg TID which is working for him. Adjunct w/ zonisamide 200mg qhs. EMU for event capture/characterization and medication optimization. We may need to decrease or stop antiseizure drugs in order  to provoke epileptic seizures for event semiology/characterization. Must be done inpatient. Patient is comfortable with plan. All questions and concerns are addressed at this time. Follow up in about 3 months (around 11/4/2023).     Patient Instructions   You came to Epilepsy Clinic because of your seizure disorder. Your seizures are partially controlled on keppra 3 tablets in the morning, 3 tablets in the afternoon, 3 tablets at night. Please continue the same medication at the same dose.     We will also add a medication called zonisamide. Please take 2 capsules (200mg) just at night. You can take it with your night dose of keppra.    Our epilepsy nurse Fernanda will call you to schedule an admission to the Epilepsy Monitoring Unit.    Do not miss any doses of medication. If a dose of medication is missed, take it as soon as it is remembered even if that means doubling up on the dose. Please get a lab test to check out the blood level of medication. Get regular sleep. Go to sleep at the same time and wake up at the same time every day. People with epilepsy require more sleep than people without epilepsy.  Sleeping 10-12 hours a day can be normal for a person with epilepsy.  Every seizure makes it harder to prevent the next seizure. Epilepsy is associated with SUDEP, or sudden unexpected death in epilepsy.  The risk is significantly higher if convulsive seizures are not well controlled. For more information, check out these websites: https://www.epilepsy.com/, https://www.epilepsyalliance[place].org/, www.[place]-epilepsy.org    Per [place] law, no episodes of loss of consciousness for 6 months before driving.  Avoid dangerous situations.  For example, no baths/pools alone, no heights, no power tools.  Wear a bike helmet.  If breakthrough seizures occur that are different in character, frequency, or duration from normal episodes, please patient portal me or call the office and we will decide the next steps. If multiple  seizures occur in a row without return back to baseline, 911 needs to be called.     Return to clinic in 3 months or sooner with issues.  Please patient portal with any questions or concerns.    Maria R Blankenship PA-C   Neurology-Epilepsy  [facility]     Problem List Items Addressed This Visit          Neuro    Generalized epilepsy, intractable - Primary    Relevant Medications    levETIRAcetam (KEPPRA) 500 MG Tab    zonisamide (ZONEGRAN) 100 MG Cap    Other Relevant Orders    EMU Monitoring       Palliative Care    Poor compliance with medication     Other Visit Diagnoses       Chronic nonintractable headache, unspecified headache type        Mood disorder due to medical condition              Disclaimer: This note has been generated using voice-recognition software. There may be typographical errors that were missed during proof-reading.     LABS:  Recent Labs   Lab 02/11/22  1619 09/04/22 2006 05/21/23  1650   WBC 5.85  --   --    Hemoglobin 14.9  --   --    Hematocrit 45.2  --   --    Platelets 231  --   --    Sodium 141 138 140   Potassium 4.4 4.0 3.8   BUN 12 13.2 9.4   Creatinine 1.0 1.12 1.14   eGFR if  >60.0  --   --    eGFR   --  88 L 78 L   eGFR if non  >60.0  --   --        Recent Labs   Lab 07/11/21  1627 10/07/21  1645 02/11/22  1619 05/31/22  1125   Levetiracetam Lvl <1.0 13.2 36.8 21.7   Zonisamide  --   --   --  2.6 L   Phenytoin Lvl <0.1 L  --   --   --         IMAGING:  Recent imaging is personally reviewed with the patient.    None in the system.    PAST MEDICAL HISTORY:   Active Ambulatory Problems     Diagnosis Date Noted    Generalized epilepsy, intractable 10/07/2021    Poor compliance with medication 10/07/2021     Resolved Ambulatory Problems     Diagnosis Date Noted    No Resolved Ambulatory Problems     Past Medical History:   Diagnosis Date    Seizures         PAST SURGICAL HISTORY: No past surgical history on file.      ALLERGIES: Patient has no known allergies.   CURRENT MEDICATIONS:   Current Outpatient Medications   Medication Sig Dispense Refill    levETIRAcetam (KEPPRA) 500 MG Tab Take 3 tablets (1,500 mg total) by mouth 2 (two) times daily. 540 tablet 3    sertraline (ZOLOFT) 50 MG tablet Take 1 tablet (50 mg total) by mouth once daily. 90 tablet 3    zonisamide (ZONEGRAN) 100 MG Cap Take 6 capsules (600 mg total) by mouth once daily. 540 capsule 3     No current facility-administered medications for this visit.        FAMILY HISTORY: No family history on file.      SOCIAL HISTORY:   Social History     Socioeconomic History    Marital status: Single   Tobacco Use    Smoking status: Every Day     Current packs/day: 1.00     Types: Cigarettes    Smokeless tobacco: Never   Substance and Sexual Activity    Alcohol use: Yes     Comment: every now and then    Drug use: No      Questions and concerns raised by the patient and family/care-giver(s) were addressed and they indicated understanding of everything discussed and agreed to plans as above.    Maria R Blankenship PA-C   Neurology-Epilepsy  [facility]    Collaborating physician, Dr. Becky Hui, was available during today's encounter.     I spent approximately 84 minutes on the day of this encounter preparing to see the patient, obtaining and reviewing history and results, performing a medically appropriate exam, counseling and educating the patient/family/caregiver, documenting clinical information, coordinating care, and ordering medications, tests, procedures, and referrals.

## 2023-08-04 NOTE — PATIENT INSTRUCTIONS
You came to Epilepsy Clinic because of your seizure disorder. Your seizures are partially controlled on keppra 3 tablets in the morning, 3 tablets in the afternoon, 3 tablets at night. Please continue the same medication at the same dose.     We will also add a medication called zonisamide. Please take 2 capsules (200mg) just at night. You can take it with your night dose of keppra.    Our epilepsy nurse Fernanda will call you to schedule an admission to the Epilepsy Monitoring Unit.    Do not miss any doses of medication. If a dose of medication is missed, take it as soon as it is remembered even if that means doubling up on the dose. Please get a lab test to check out the blood level of medication. Get regular sleep. Go to sleep at the same time and wake up at the same time every day. People with epilepsy require more sleep than people without epilepsy.  Sleeping 10-12 hours a day can be normal for a person with epilepsy.  Every seizure makes it harder to prevent the next seizure. Epilepsy is associated with SUDEP, or sudden unexpected death in epilepsy.  The risk is significantly higher if convulsive seizures are not well controlled. For more information, check out these websites: https://www.epilepsy.com/, https://www.epilepsyalliance[place].org/, www.[place]-epilepsy.org    Per [place] law, no episodes of loss of consciousness for 6 months before driving.  Avoid dangerous situations.  For example, no baths/pools alone, no heights, no power tools.  Wear a bike helmet.  If breakthrough seizures occur that are different in character, frequency, or duration from normal episodes, please patient portal me or call the office and we will decide the next steps. If multiple seizures occur in a row without return back to baseline, 911 needs to be called.     Return to clinic in 3 months or sooner with issues.  Please patient portal with any questions or concerns.    Maria R Blankenship PA-C   Neurology-Epilepsy  [facility]

## 2023-08-07 ENCOUNTER — TELEPHONE (OUTPATIENT)
Dept: NEUROLOGY | Facility: CLINIC | Age: 53
End: 2023-08-07
Payer: MEDICAID

## 2023-08-07 NOTE — TELEPHONE ENCOUNTER
Spoke with patient and he reports being unable to have someone come with him for his EMU admission. I explained this can be scheduled further out as the order is good for a year. He did not realize this. He is going to speak with his kids and see if there's a time in the future they can come with him. Has my direct line as POC

## 2023-09-08 ENCOUNTER — TELEPHONE (OUTPATIENT)
Dept: NEUROLOGY | Facility: CLINIC | Age: 53
End: 2023-09-08
Payer: MEDICAID

## 2023-09-08 NOTE — TELEPHONE ENCOUNTER
Spoke to patient. Schedule for sooner appointment with Maria R for 10/10. When over medication changes with patient as per Dr Hui. Patient verbalized understanding

## 2023-10-10 ENCOUNTER — OFFICE VISIT (OUTPATIENT)
Dept: NEUROLOGY | Facility: CLINIC | Age: 53
End: 2023-10-10
Payer: MEDICAID

## 2023-10-10 ENCOUNTER — HOSPITAL ENCOUNTER (OUTPATIENT)
Facility: HOSPITAL | Age: 53
Discharge: HOME | End: 2023-10-12
Attending: EMERGENCY MEDICINE | Admitting: HOSPITALIST
Payer: MEDICAID

## 2023-10-10 VITALS
DIASTOLIC BLOOD PRESSURE: 91 MMHG | BODY MASS INDEX: 22.05 KG/M2 | HEIGHT: 72 IN | HEART RATE: 87 BPM | SYSTOLIC BLOOD PRESSURE: 130 MMHG | WEIGHT: 162.81 LBS

## 2023-10-10 DIAGNOSIS — G40.319 GENERALIZED EPILEPSY, INTRACTABLE: Primary | ICD-10-CM

## 2023-10-10 DIAGNOSIS — R56.9 SEIZURE: ICD-10-CM

## 2023-10-10 DIAGNOSIS — G89.29 CHRONIC NONINTRACTABLE HEADACHE, UNSPECIFIED HEADACHE TYPE: ICD-10-CM

## 2023-10-10 DIAGNOSIS — R07.9 CHEST PAIN: ICD-10-CM

## 2023-10-10 DIAGNOSIS — R51.9 CHRONIC NONINTRACTABLE HEADACHE, UNSPECIFIED HEADACHE TYPE: ICD-10-CM

## 2023-10-10 DIAGNOSIS — G40.909 RECURRENT SEIZURES: ICD-10-CM

## 2023-10-10 DIAGNOSIS — F06.30 MOOD DISORDER DUE TO MEDICAL CONDITION: ICD-10-CM

## 2023-10-10 LAB
ALBUMIN SERPL BCP-MCNC: 4.4 G/DL (ref 3.5–5.2)
ALP SERPL-CCNC: 78 U/L (ref 55–135)
ALT SERPL W/O P-5'-P-CCNC: 10 U/L (ref 10–44)
ANION GAP SERPL CALC-SCNC: 9 MMOL/L (ref 8–16)
AST SERPL-CCNC: 15 U/L (ref 10–40)
BASOPHILS # BLD AUTO: 0.02 K/UL (ref 0–0.2)
BASOPHILS NFR BLD: 0.4 % (ref 0–1.9)
BILIRUB SERPL-MCNC: 0.5 MG/DL (ref 0.1–1)
BILIRUB UR QL STRIP: NEGATIVE
BUN SERPL-MCNC: 11 MG/DL (ref 6–20)
CALCIUM SERPL-MCNC: 10.1 MG/DL (ref 8.7–10.5)
CHLORIDE SERPL-SCNC: 105 MMOL/L (ref 95–110)
CLARITY UR REFRACT.AUTO: CLEAR
CO2 SERPL-SCNC: 27 MMOL/L (ref 23–29)
COLOR UR AUTO: YELLOW
CREAT SERPL-MCNC: 0.9 MG/DL (ref 0.5–1.4)
DIFFERENTIAL METHOD: ABNORMAL
EOSINOPHIL # BLD AUTO: 0.1 K/UL (ref 0–0.5)
EOSINOPHIL NFR BLD: 1.1 % (ref 0–8)
ERYTHROCYTE [DISTWIDTH] IN BLOOD BY AUTOMATED COUNT: 12.9 % (ref 11.5–14.5)
EST. GFR  (NO RACE VARIABLE): >60 ML/MIN/1.73 M^2
GLUCOSE SERPL-MCNC: 79 MG/DL (ref 70–110)
GLUCOSE UR QL STRIP: NEGATIVE
HCT VFR BLD AUTO: 45.9 % (ref 40–54)
HCV AB SERPL QL IA: NORMAL
HGB BLD-MCNC: 14.8 G/DL (ref 14–18)
HGB UR QL STRIP: NEGATIVE
HIV 1+2 AB+HIV1 P24 AG SERPL QL IA: NORMAL
IMM GRANULOCYTES # BLD AUTO: 0.01 K/UL (ref 0–0.04)
IMM GRANULOCYTES NFR BLD AUTO: 0.2 % (ref 0–0.5)
KETONES UR QL STRIP: NEGATIVE
LEUKOCYTE ESTERASE UR QL STRIP: ABNORMAL
LYMPHOCYTES # BLD AUTO: 2.2 K/UL (ref 1–4.8)
LYMPHOCYTES NFR BLD: 45.8 % (ref 18–48)
MCH RBC QN AUTO: 31.4 PG (ref 27–31)
MCHC RBC AUTO-ENTMCNC: 32.2 G/DL (ref 32–36)
MCV RBC AUTO: 98 FL (ref 82–98)
MICROSCOPIC COMMENT: ABNORMAL
MONOCYTES # BLD AUTO: 0.3 K/UL (ref 0.3–1)
MONOCYTES NFR BLD: 7 % (ref 4–15)
NEUTROPHILS # BLD AUTO: 2.2 K/UL (ref 1.8–7.7)
NEUTROPHILS NFR BLD: 45.5 % (ref 38–73)
NITRITE UR QL STRIP: NEGATIVE
NRBC BLD-RTO: 0 /100 WBC
PH UR STRIP: 6 [PH] (ref 5–8)
PLATELET # BLD AUTO: 241 K/UL (ref 150–450)
PMV BLD AUTO: 9.6 FL (ref 9.2–12.9)
POCT GLUCOSE: 86 MG/DL (ref 70–110)
POTASSIUM SERPL-SCNC: 4.4 MMOL/L (ref 3.5–5.1)
PROT SERPL-MCNC: 8 G/DL (ref 6–8.4)
PROT UR QL STRIP: NEGATIVE
RBC # BLD AUTO: 4.71 M/UL (ref 4.6–6.2)
RBC #/AREA URNS AUTO: 1 /HPF (ref 0–4)
SODIUM SERPL-SCNC: 141 MMOL/L (ref 136–145)
SP GR UR STRIP: 1.02 (ref 1–1.03)
SQUAMOUS #/AREA URNS AUTO: 0 /HPF
URN SPEC COLLECT METH UR: ABNORMAL
WBC # BLD AUTO: 4.74 K/UL (ref 3.9–12.7)
WBC #/AREA URNS AUTO: 11 /HPF (ref 0–5)

## 2023-10-10 PROCEDURE — 93010 ELECTROCARDIOGRAM REPORT: CPT | Mod: ,,, | Performed by: INTERNAL MEDICINE

## 2023-10-10 PROCEDURE — 63600175 PHARM REV CODE 636 W HCPCS: Performed by: EMERGENCY MEDICINE

## 2023-10-10 PROCEDURE — 3008F BODY MASS INDEX DOCD: CPT | Mod: CPTII,,,

## 2023-10-10 PROCEDURE — 3075F PR MOST RECENT SYSTOLIC BLOOD PRESS GE 130-139MM HG: ICD-10-PCS | Mod: CPTII,,,

## 2023-10-10 PROCEDURE — 99417 PROLNG OP E/M EACH 15 MIN: CPT | Mod: S$PBB,,,

## 2023-10-10 PROCEDURE — 99223 1ST HOSP IP/OBS HIGH 75: CPT | Mod: ,,, | Performed by: FAMILY MEDICINE

## 2023-10-10 PROCEDURE — 96375 TX/PRO/DX INJ NEW DRUG ADDON: CPT

## 2023-10-10 PROCEDURE — 3080F PR MOST RECENT DIASTOLIC BLOOD PRESSURE >= 90 MM HG: ICD-10-PCS | Mod: CPTII,,,

## 2023-10-10 PROCEDURE — 25000003 PHARM REV CODE 250: Performed by: FAMILY MEDICINE

## 2023-10-10 PROCEDURE — 96365 THER/PROPH/DIAG IV INF INIT: CPT

## 2023-10-10 PROCEDURE — 99215 PR OFFICE/OUTPT VISIT, EST, LEVL V, 40-54 MIN: ICD-10-PCS | Mod: S$PBB,,,

## 2023-10-10 PROCEDURE — 81001 URINALYSIS AUTO W/SCOPE: CPT | Performed by: NURSE PRACTITIONER

## 2023-10-10 PROCEDURE — 86803 HEPATITIS C AB TEST: CPT | Performed by: PHYSICIAN ASSISTANT

## 2023-10-10 PROCEDURE — 3008F PR BODY MASS INDEX (BMI) DOCUMENTED: ICD-10-PCS | Mod: CPTII,,,

## 2023-10-10 PROCEDURE — 80203 DRUG SCREEN QUANT ZONISAMIDE: CPT | Performed by: NURSE PRACTITIONER

## 2023-10-10 PROCEDURE — 93005 ELECTROCARDIOGRAM TRACING: CPT

## 2023-10-10 PROCEDURE — 99223 PR INITIAL HOSPITAL CARE,LEVL III: ICD-10-PCS | Mod: ,,, | Performed by: FAMILY MEDICINE

## 2023-10-10 PROCEDURE — 99285 EMERGENCY DEPT VISIT HI MDM: CPT | Mod: 27

## 2023-10-10 PROCEDURE — 3075F SYST BP GE 130 - 139MM HG: CPT | Mod: CPTII,,,

## 2023-10-10 PROCEDURE — 3080F DIAST BP >= 90 MM HG: CPT | Mod: CPTII,,,

## 2023-10-10 PROCEDURE — 99999 PR PBB SHADOW E&M-EST. PATIENT-LVL III: ICD-10-PCS | Mod: PBBFAC,,,

## 2023-10-10 PROCEDURE — 93010 EKG 12-LEAD: ICD-10-PCS | Mod: ,,, | Performed by: INTERNAL MEDICINE

## 2023-10-10 PROCEDURE — 94761 N-INVAS EAR/PLS OXIMETRY MLT: CPT

## 2023-10-10 PROCEDURE — 85025 COMPLETE CBC W/AUTO DIFF WBC: CPT | Performed by: NURSE PRACTITIONER

## 2023-10-10 PROCEDURE — 1159F PR MEDICATION LIST DOCUMENTED IN MEDICAL RECORD: ICD-10-PCS | Mod: CPTII,,,

## 2023-10-10 PROCEDURE — 1159F MED LIST DOCD IN RCRD: CPT | Mod: CPTII,,,

## 2023-10-10 PROCEDURE — 25000003 PHARM REV CODE 250: Performed by: EMERGENCY MEDICINE

## 2023-10-10 PROCEDURE — 99900035 HC TECH TIME PER 15 MIN (STAT)

## 2023-10-10 PROCEDURE — 96366 THER/PROPH/DIAG IV INF ADDON: CPT

## 2023-10-10 PROCEDURE — 82962 GLUCOSE BLOOD TEST: CPT

## 2023-10-10 PROCEDURE — 87389 HIV-1 AG W/HIV-1&-2 AB AG IA: CPT | Performed by: PHYSICIAN ASSISTANT

## 2023-10-10 PROCEDURE — G0378 HOSPITAL OBSERVATION PER HR: HCPCS

## 2023-10-10 PROCEDURE — 99999 PR PBB SHADOW E&M-EST. PATIENT-LVL III: CPT | Mod: PBBFAC,,,

## 2023-10-10 PROCEDURE — 80053 COMPREHEN METABOLIC PANEL: CPT | Performed by: NURSE PRACTITIONER

## 2023-10-10 PROCEDURE — 87086 URINE CULTURE/COLONY COUNT: CPT | Performed by: NURSE PRACTITIONER

## 2023-10-10 PROCEDURE — 80177 DRUG SCRN QUAN LEVETIRACETAM: CPT | Performed by: NURSE PRACTITIONER

## 2023-10-10 PROCEDURE — 99215 OFFICE O/P EST HI 40 MIN: CPT | Mod: S$PBB,,,

## 2023-10-10 PROCEDURE — 99213 OFFICE O/P EST LOW 20 MIN: CPT | Mod: PBBFAC,25

## 2023-10-10 PROCEDURE — 96361 HYDRATE IV INFUSION ADD-ON: CPT

## 2023-10-10 PROCEDURE — 99417 PR PROLONGED SVC, OUTPT, W/WO DIRECT PT CONTACT,  EA ADDTL 15 MIN: ICD-10-PCS | Mod: S$PBB,,,

## 2023-10-10 RX ORDER — ALBUTEROL SULFATE 90 UG/1
2 AEROSOL, METERED RESPIRATORY (INHALATION) EVERY 6 HOURS PRN
Status: DISCONTINUED | OUTPATIENT
Start: 2023-10-10 | End: 2023-10-12 | Stop reason: HOSPADM

## 2023-10-10 RX ORDER — NALOXONE HCL 0.4 MG/ML
0.02 VIAL (ML) INJECTION
Status: DISCONTINUED | OUTPATIENT
Start: 2023-10-10 | End: 2023-10-12 | Stop reason: HOSPADM

## 2023-10-10 RX ORDER — MAG HYDROX/ALUMINUM HYD/SIMETH 200-200-20
30 SUSPENSION, ORAL (FINAL DOSE FORM) ORAL 4 TIMES DAILY PRN
Status: DISCONTINUED | OUTPATIENT
Start: 2023-10-10 | End: 2023-10-12 | Stop reason: HOSPADM

## 2023-10-10 RX ORDER — MAGNESIUM SULFATE HEPTAHYDRATE 40 MG/ML
2 INJECTION, SOLUTION INTRAVENOUS ONCE
Status: COMPLETED | OUTPATIENT
Start: 2023-10-10 | End: 2023-10-10

## 2023-10-10 RX ORDER — LORAZEPAM 2 MG/ML
2 INJECTION INTRAMUSCULAR
Status: DISCONTINUED | OUTPATIENT
Start: 2023-10-10 | End: 2023-10-12 | Stop reason: HOSPADM

## 2023-10-10 RX ORDER — TALC
6 POWDER (GRAM) TOPICAL NIGHTLY PRN
Status: DISCONTINUED | OUTPATIENT
Start: 2023-10-10 | End: 2023-10-12 | Stop reason: HOSPADM

## 2023-10-10 RX ORDER — IBUPROFEN 200 MG
24 TABLET ORAL
Status: DISCONTINUED | OUTPATIENT
Start: 2023-10-10 | End: 2023-10-12 | Stop reason: HOSPADM

## 2023-10-10 RX ORDER — VERAPAMIL HYDROCHLORIDE 80 MG/1
80 TABLET ORAL 3 TIMES DAILY
COMMUNITY
Start: 2023-09-25 | End: 2023-10-31

## 2023-10-10 RX ORDER — SODIUM CHLORIDE 0.9 % (FLUSH) 0.9 %
10 SYRINGE (ML) INJECTION EVERY 12 HOURS PRN
Status: DISCONTINUED | OUTPATIENT
Start: 2023-10-10 | End: 2023-10-12 | Stop reason: HOSPADM

## 2023-10-10 RX ORDER — KETOROLAC TROMETHAMINE 30 MG/ML
15 INJECTION, SOLUTION INTRAMUSCULAR; INTRAVENOUS
Status: COMPLETED | OUTPATIENT
Start: 2023-10-10 | End: 2023-10-10

## 2023-10-10 RX ORDER — ONDANSETRON 2 MG/ML
4 INJECTION INTRAMUSCULAR; INTRAVENOUS EVERY 8 HOURS PRN
Status: DISCONTINUED | OUTPATIENT
Start: 2023-10-10 | End: 2023-10-12 | Stop reason: HOSPADM

## 2023-10-10 RX ORDER — RIMEGEPANT SULFATE 75 MG/75MG
75 TABLET, ORALLY DISINTEGRATING ORAL ONCE AS NEEDED
COMMUNITY
End: 2023-10-31

## 2023-10-10 RX ORDER — MORPHINE SULFATE 4 MG/ML
3 INJECTION, SOLUTION INTRAMUSCULAR; INTRAVENOUS EVERY 4 HOURS PRN
Status: DISCONTINUED | OUTPATIENT
Start: 2023-10-10 | End: 2023-10-12 | Stop reason: HOSPADM

## 2023-10-10 RX ORDER — KETOROLAC TROMETHAMINE 30 MG/ML
15 INJECTION, SOLUTION INTRAMUSCULAR; INTRAVENOUS EVERY 6 HOURS PRN
Status: DISCONTINUED | OUTPATIENT
Start: 2023-10-10 | End: 2023-10-12 | Stop reason: HOSPADM

## 2023-10-10 RX ORDER — GLUCAGON 1 MG
1 KIT INJECTION
Status: DISCONTINUED | OUTPATIENT
Start: 2023-10-10 | End: 2023-10-12 | Stop reason: HOSPADM

## 2023-10-10 RX ORDER — ACETAMINOPHEN 325 MG/1
650 TABLET ORAL EVERY 4 HOURS PRN
Status: DISCONTINUED | OUTPATIENT
Start: 2023-10-10 | End: 2023-10-11

## 2023-10-10 RX ORDER — ZONISAMIDE 100 MG/1
300 CAPSULE ORAL NIGHTLY
Status: DISCONTINUED | OUTPATIENT
Start: 2023-10-10 | End: 2023-10-12 | Stop reason: HOSPADM

## 2023-10-10 RX ORDER — HYDRALAZINE HYDROCHLORIDE 25 MG/1
25 TABLET, FILM COATED ORAL EVERY 8 HOURS PRN
Status: DISCONTINUED | OUTPATIENT
Start: 2023-10-10 | End: 2023-10-12 | Stop reason: HOSPADM

## 2023-10-10 RX ORDER — IBUPROFEN 200 MG
16 TABLET ORAL
Status: DISCONTINUED | OUTPATIENT
Start: 2023-10-10 | End: 2023-10-12 | Stop reason: HOSPADM

## 2023-10-10 RX ORDER — LEVETIRACETAM 750 MG/1
1500 TABLET ORAL 3 TIMES DAILY
Status: DISCONTINUED | OUTPATIENT
Start: 2023-10-10 | End: 2023-10-11

## 2023-10-10 RX ORDER — VERAPAMIL HYDROCHLORIDE 40 MG/1
80 TABLET ORAL 3 TIMES DAILY
Status: DISCONTINUED | OUTPATIENT
Start: 2023-10-10 | End: 2023-10-12 | Stop reason: HOSPADM

## 2023-10-10 RX ADMIN — VERAPAMIL HYDROCHLORIDE 80 MG: 40 TABLET ORAL at 10:10

## 2023-10-10 RX ADMIN — SODIUM CHLORIDE 1000 ML: 9 INJECTION, SOLUTION INTRAVENOUS at 06:10

## 2023-10-10 RX ADMIN — ZONISAMIDE 300 MG: 100 CAPSULE ORAL at 10:10

## 2023-10-10 RX ADMIN — MAGNESIUM SULFATE HEPTAHYDRATE 2 G: 40 INJECTION, SOLUTION INTRAVENOUS at 07:10

## 2023-10-10 RX ADMIN — HYDRALAZINE HYDROCHLORIDE 25 MG: 25 TABLET, FILM COATED ORAL at 08:10

## 2023-10-10 RX ADMIN — KETOROLAC TROMETHAMINE 15 MG: 30 INJECTION INTRAMUSCULAR; INTRAVENOUS at 06:10

## 2023-10-10 RX ADMIN — LEVETIRACETAM 1500 MG: 500 TABLET, FILM COATED ORAL at 08:10

## 2023-10-10 NOTE — PROGRESS NOTES
Name: Irvin Meza  MRN:[id number]   CSN: [id number]  Date of service: 10/10/2023  Age:53 y.o.   Gender:male   Referring Physician/Service: No referring provider defined for this encounter.   The patient is here today with: self    Neurology Clinic:  Follow-up Visit    CHIEF COMPLAINT:  Epilepsy    Interval Events/ROS 10/10/2023:    Current ASM/SEs: levetiracetam 1500mg TID (6am, 12pm, 10pm) and zonisamide 300mg nightly (10pm); SE mood issues   Breakthrough seizures/events: uncontrolled convulsions, continues to experience multiple GTCs weekly despite compliance with medications  Driving: no  Sleep: not well because of headaches that wake him up, reports he was recently dx w/ cluster headaches (intense pain to L side of head w/ unilateral tearing + rhinorrhea, occurring near-daily for the past few months)   Mood: depressed, apathetic towards living    Otherwise, no fever, no changes in vision, no new weakness, no chest pain, no shortness of breath, no nausea, no vomiting, no diarrhea, no constipation, no tingling/numbness, no problems walking.    Recent Labs   Lab 07/11/21  1627 10/07/21  1645 02/11/22  1619 05/31/22  1125   Levetiracetam Lvl <1.0 13.2 36.8 21.7   Zonisamide  --   --   --  2.6 L   Phenytoin Lvl <0.1 L  --   --   --       Interval Events/ROS 8/4/2023:    Current ASM/SEs: levetiracetam 1500mg TID, does not recall taking zonisamide; states mom and daughter help remind him to take his meds; mood SE  Breakthrough seizures/events: brief 'black out' spells daily; last convulsive event was 7/10/2023 that occurred in the setting of missed medication per EMR -> evaluated at [facility]  Driving: no  Sleep: not good  Mood: depressed, passive suicidal ideation, not taking sertraline due to GI side effects     Headaches. Otherwise, no fever, no cold symptoms, no changes in vision, no new weakness, no chest pain, no shortness of breath, no nausea, no vomiting, no diarrhea, no constipation, no tingling/numbness,  no problems walking.     Interval Events/ROS 8/31/2022:    Current ASM/SEs: patient is taking levetiracetam 500mg TID and zonisamide 100mg BID; no reported side effects    Breakthrough seizures/events: generalized convulsion 07/03/2022, prior to that 04/2022; blackout/dyscognitive episodes 3-4 in the past two months  Driving: no  Folic acid: no  Sleep: sleeps well if head is not hurting   Mood: okay    Frequent headaches 3-4 days per week for the past 2 months described as a throbbing pain to L side of head and behind L eye. Associated photophobia. Denies N/V. Relieved w/ excedrin migraine. Otherwise, no fever, no cold symptoms, no changes in vision, no new weakness, no chest pain, no shortness of breath, no nausea, no vomiting, no diarrhea, no constipation, no tingling/numbness, no problems walking.     Interval Events/ROS 5/31/2022:    Breakthrough event 04/03/2022 despite levetiracetam 1500mg BID and zonisamide 100mg BID (originally prescribed 300mg qhs but patient states he tries not to take a lot of medication). No missed doses. No reported side effects. No identifiable trigger of recent breakthrough event though does mention he is not sleeping well at night, not sure why. Not working. Trying to work with  regarding disability. Denies feeling depressed though mentions he does get angry a lot. No SI or HI. Otherwise, no fever, no cold symptoms, no headache, no changes in vision, no new weakness, no chest pain, no shortness of breath, no nausea, no vomiting, no diarrhea, no constipation, no tingling/numbness, no problems walking.    Interval Events/ROS 2/11/2022:  Seizure 1/18/2022, trying to get out. Black out. This year 1 seizure. Keppra is okay. 5-6 seizure over the whole year in 2021 despite compliance. Taking keppra. Caught a seizure in his sleep.  Head laceration.  Now a helmet has been ordered to protect him during sleep.  Covid vaccination and boosted. Trying to get bone density test. Pain  "especially in the back. Mood okay. Not working. Only way to get job is if he lied. Trying to getting disability. Letter written. Needs to get a . Close up vision is blurry. No eye doctor. Vimpat twice daily but he did not have any refills on this medications so did not continue it.  Lacosamide caused poor appetite. Otherwise, no fever, no cold symptoms, no new weakness, no chest pain, no shortness of breath, no nausea, no vomiting, no diarrhea, no constipation, no tingling/numbness, no problems walking, no mood issues.    HPI 10/7/2021:     Age of first seizure: born with seizures, seizures as an infant   Seizure Risk Factors: no family history of seizures, term, unclear about details of birth but does not think he stayed in the hospital long, no CNS infections, several head strikes with seizures only   Time of Last Seizure: week ago   # of lifetime Seizures: 1000+  Frequency of Seizures:  At most 2 in a day, 1-2 seizures per month  Seizure Triggers:  Overheating, strong smells like perfume and gasoline, lights and too much TV, etoh  Injuries/Hospitalization for seizures? Head strike, tongue bite, fractured arms, lost teeth (had to get teeth pulled)  Bone Health: Dexa at [facility] but does not know results      Auras:  Headache and "weird feeling" -> space out, will start stuttering     Seizure Events:   1. Generalized convusions, Banging head back, eye roll back, does not want anyone to touch him, will throw people around, urinary incontinence, tired afterwards, couple days to get back to normal, sore afterwards with a persistent headache   2. Black out, wander off, walk across the stress, act funny, can be combative, does not want to be touched, very tired    Current AED/SEs:  1. Levetiracetam 1000 mg twice daily SE still having seizures but frequently misses doses    Previous AED/SEs or reason for DC.   1. Phenytoin SE dental issues     EEG: done at [facility] -> 3-4 Hz discharges per care everywhere " tab    CT 2011: normal     Other Allergies: none      AED compliance, adherence: misses some doses     ROS 10/7/2021:  Vaccination. Problems with taste after seizures. Otherwise, denies headache at this moment, loss of vision, blurred vision, diplopia, dysarthria, dysphagia, lightheadedness, vertigo, tinnitus or hearing difficulty. Denies difficulties producing or comprehending speech.  Denies focal weakness, numbness, paresthesias. Denies difficulty with gait. Denies cough, shortness of breath.  Denies chest pain or tightness, palpitations.  Denies nausea, vomiting, diarrhea, constipation or abdominal pain.  No bowel or bladder incontinence or retention.  No falls.       EXAM:   - Vitals: BP (!) 130/91   Pulse 87   Ht 6' (1.829 m)   Wt 73.9 kg (162 lb 13 oz)   BMI 22.08 kg/m²    - General: Awake, cooperative. Mild distress.   - HEENT: NC/AT, edentulous  - Neck: Supple  - Pulmonary: no increased WOB  - Cardiac: well perfused   - Abdomen: soft, nontender, nondistended  - Extremities: no edema  - Skin: no rashes or lesions noted.     NEURO EXAM:   - Mental Status: Awake, alert, oriented x 3. Able to relate history without difficulty. Attentive to examiner. Language is fluent with intact repetition and comprehension. Normal prosody. There were no paraphasic errors. Able to name both high and low frequency objects. Speech was mildly dysarthric (edentulous). Able to follow both midline and appendicular commands. There was no evidence of apraxia or neglect.    - Cranial Nerves:  VFF to confrontation. EOMI. No facial droop. Hearing intact to room voice. 5/5 strength in trapezii and SCM bilaterally.     - Motor: Normal bulk and tone throughout. No pronator drift bilaterally. No adventitious movements such as tremor or asterixis noted.     Delt Bic Tri WrE WrF  FFl FE IO IP Quad Ham TA Gastroc   R   5     5    5    5    5        5   5    5   5    5        5     5      5            L   5      5    5   5    5        5    5    5    5    5       5     5      5              - Sensory: No deficits to light touch. No extinction to DSS.  - Coordination: No dysmetria on FNF   - Gait: Good initiation. Narrow-based, normal stride and arm swing. Romberg negative.    PLAN: 53-year-old man with intractable generalized epilepsy (3-4 hz generalized discharges on outside EEG report, no tracing available for review) with frequent generalized convulsions and dyscognitive episodes with bizarre behaviors. Convulsions are currently uncontrolled despite reported compliance w/ levetiracetam and zonisamide. Advised patient seek urgent evaluation in the ED for likely admission w/ gen neuro consult and prolonged EEG monitoring for event capture/characterization and rapid medication optimization. Pt is at high risk for injury as well as increased risk of SUDEP if convulsive seizures are not well controlled. Appreciate obtaining levetiracetam and zonisamide medication levels upon evaluation. Recommend cross taper off levetiracetam to alternative ASM while inpatient given it has been ineffective in controlling pt's seizures as well as could be contributing to pt's mood comorbidities. Referral to headache team for evaluation/management of cluster headaches. Consider psychiatry referral next visit. Patient is comfortable with plan. All questions and concerns are addressed at this time. Follow up in about 2 months (around 12/10/2023).     Patient Instructions   You have been having frequent convulsions despite two anti-seizure medications. Please seek care in the emergency department right away and let them know your neurologist sent you for prolonged EEG monitoring since your seizures are currently uncontrolled. We need to get a better idea of what is going on and likely adjust your medications.     Problem List Items Addressed This Visit          Neuro    Generalized epilepsy, intractable - Primary     Other Visit Diagnoses       Chronic nonintractable headache,  unspecified headache type        Relevant Orders    Ambulatory referral/consult to Neurology    Mood disorder due to medical condition              Disclaimer: This note has been generated using voice-recognition software. There may be typographical errors that were missed during proof-reading.     LABS:  Recent Labs   Lab 02/11/22  1619 04/03/22  1518 09/04/22 2006 05/21/23  1650   WBC 5.85  --   --   --    Hemoglobin 14.9  --   --   --    Hematocrit 45.2  --   --   --    Platelets 231  --   --   --    Sodium 141   < > 138 140   Potassium 4.4   < > 4.0 3.8   BUN 12   < > 13.2 9.4   Creatinine 1.0   < > 1.12 1.14   eGFR if  >60.0  --   --   --    eGFR   --    < > 88 L 78 L   eGFR if non  >60.0  --   --   --     < > = values in this interval not displayed.       Recent Labs   Lab 07/11/21  1627 10/07/21  1645 02/11/22  1619 05/31/22  1125   Levetiracetam Lvl <1.0 13.2 36.8 21.7   Zonisamide  --   --   --  2.6 L   Phenytoin Lvl <0.1 L  --   --   --         IMAGING:  Recent imaging is personally reviewed with the patient.    None in the system.    PAST MEDICAL HISTORY:   Active Ambulatory Problems     Diagnosis Date Noted    Generalized epilepsy, intractable 10/07/2021    Poor compliance with medication 10/07/2021     Resolved Ambulatory Problems     Diagnosis Date Noted    No Resolved Ambulatory Problems     Past Medical History:   Diagnosis Date    Seizures         PAST SURGICAL HISTORY: No past surgical history on file.     ALLERGIES: Patient has no known allergies.   CURRENT MEDICATIONS:   Current Outpatient Medications   Medication Sig Dispense Refill    levETIRAcetam (KEPPRA) 500 MG Tab Take 3 tablets (1,500 mg total) by mouth 3 (three) times daily. 810 tablet 3    zonisamide (ZONEGRAN) 100 MG Cap Take 2 capsules (200 mg total) by mouth every evening. 180 capsule 3     No current facility-administered medications for this visit.        FAMILY HISTORY: No family  history on file.      SOCIAL HISTORY:   Social History     Socioeconomic History    Marital status: Single   Tobacco Use    Smoking status: Every Day     Current packs/day: 1.00     Types: Cigarettes    Smokeless tobacco: Never   Substance and Sexual Activity    Alcohol use: Yes     Comment: every now and then    Drug use: No      Questions and concerns raised by the patient and family/care-giver(s) were addressed and they indicated understanding of everything discussed and agreed to plans as above.    Maria R Blankenship PA-C   Neurology-Epilepsy  [facility]    Collaborating physician, Dr. Becky Hui, was available during today's encounter.     I spent approximately 76 minutes on the day of this encounter preparing to see the patient, obtaining and reviewing history and results, performing a medically appropriate exam, counseling and educating the patient/family/caregiver, documenting clinical information, coordinating care, and ordering medications, tests, procedures, and referrals.

## 2023-10-10 NOTE — FIRST PROVIDER EVALUATION
" Emergency Department TeleTriage Encounter Note      CHIEF COMPLAINT    Chief Complaint   Patient presents with    Referral     Pt presents from neurology clinic. States he was told to come to ED to get admitted for EEG monitoring d/t increased seizure-like activity. Reports medication compliance. States he had a seizure earlier today; states he "blacked out."       VITAL SIGNS   Initial Vitals [10/10/23 1616]   BP Pulse Resp Temp SpO2   (!) 179/117 85 18 97.9 °F (36.6 °C) 95 %      MAP       --            ALLERGIES    Review of patient's allergies indicates:  No Known Allergies    PROVIDER TRIAGE NOTE  Verbal consent for the teletriage evaluation was given by the patient at the start of the evaluation.  All efforts will be made to maintain patient's privacy during the evaluation.      This is a teletriage evaluation of a 53 y.o. male presenting to the ED with c/o increase in seizure activity with LOC.  Compliant with medication. Compliant with Keppra, Zonegran for seizures.  Sent in by Neurology for admission for an EEG.  Limited physical exam via telehealth: The patient is awake, alert, answering questions appropriately and is not in respiratory distress.  As the Teletriage provider, I performed an initial assessment and ordered appropriate labs and imaging studies, if any, to facilitate the patient's care once placed in the ED. Once a room is available, care and a full evaluation will be completed by an alternate ED provider.  Any additional orders and the final disposition will be determined by that provider.  All imaging and labs will not be followed-up by the Teletriage Team, including myself.          ORDERS  Labs Reviewed   HIV 1 / 2 ANTIBODY   HEPATITIS C ANTIBODY       ED Orders (720h ago, onward)      Start Ordered     Status Ordering Provider    10/10/23 1620 10/10/23 1620  HIV 1/2 Ag/Ab (4th Gen)  STAT         Ordered PACHECO JACINTO    10/10/23 1620 10/10/23 1620  Hepatitis C Antibody  STAT      "    PACHECO Vieira              Virtual Visit Note: The provider triage portion of this emergency department evaluation and documentation was performed via [employer], a HIPAA-compliant telemedicine application, in concert with a tele-presenter in the room. A face to face patient evaluation with one of my colleagues will occur once the patient is placed in an emergency department room.      DISCLAIMER: This note was prepared with Jusp voice recognition transcription software. Garbled syntax, mangled pronouns, and other bizarre constructions may be attributed to that software system.

## 2023-10-10 NOTE — PATIENT INSTRUCTIONS
You have been having frequent convulsions despite two anti-seizure medications. Please seek care in the emergency department right away and let them know your neurologist sent you for prolonged EEG monitoring since your seizures are currently uncontrolled. We need to get a better idea of what is going on and likely adjust your medications.

## 2023-10-11 ENCOUNTER — DOCUMENTATION ONLY (OUTPATIENT)
Dept: NEUROLOGY | Facility: CLINIC | Age: 53
End: 2023-10-11

## 2023-10-11 PROBLEM — I47.10 PAROXYSMAL SVT (SUPRAVENTRICULAR TACHYCARDIA): Status: ACTIVE | Noted: 2023-10-11

## 2023-10-11 PROBLEM — G44.009 CLUSTER HEADACHE, NOT INTRACTABLE: Status: ACTIVE | Noted: 2023-09-12

## 2023-10-11 PROBLEM — R07.9 INTERMITTENT CHEST PAIN: Status: ACTIVE | Noted: 2023-10-11

## 2023-10-11 LAB
ALBUMIN SERPL BCP-MCNC: 3.8 G/DL (ref 3.5–5.2)
ALP SERPL-CCNC: 73 U/L (ref 55–135)
ALT SERPL W/O P-5'-P-CCNC: 7 U/L (ref 10–44)
ANION GAP SERPL CALC-SCNC: 7 MMOL/L (ref 8–16)
AST SERPL-CCNC: 12 U/L (ref 10–40)
BACTERIA UR CULT: NO GROWTH
BASOPHILS # BLD AUTO: 0.02 K/UL (ref 0–0.2)
BASOPHILS NFR BLD: 0.4 % (ref 0–1.9)
BILIRUB SERPL-MCNC: 0.5 MG/DL (ref 0.1–1)
BUN SERPL-MCNC: 11 MG/DL (ref 6–20)
CALCIUM SERPL-MCNC: 9.5 MG/DL (ref 8.7–10.5)
CHLORIDE SERPL-SCNC: 109 MMOL/L (ref 95–110)
CO2 SERPL-SCNC: 25 MMOL/L (ref 23–29)
CREAT SERPL-MCNC: 1.1 MG/DL (ref 0.5–1.4)
DIFFERENTIAL METHOD: ABNORMAL
EOSINOPHIL # BLD AUTO: 0.1 K/UL (ref 0–0.5)
EOSINOPHIL NFR BLD: 2 % (ref 0–8)
ERYTHROCYTE [DISTWIDTH] IN BLOOD BY AUTOMATED COUNT: 12.8 % (ref 11.5–14.5)
EST. GFR  (NO RACE VARIABLE): >60 ML/MIN/1.73 M^2
GLUCOSE SERPL-MCNC: 82 MG/DL (ref 70–110)
HCT VFR BLD AUTO: 44.5 % (ref 40–54)
HGB BLD-MCNC: 14.6 G/DL (ref 14–18)
IMM GRANULOCYTES # BLD AUTO: 0.01 K/UL (ref 0–0.04)
IMM GRANULOCYTES NFR BLD AUTO: 0.2 % (ref 0–0.5)
LYMPHOCYTES # BLD AUTO: 2.4 K/UL (ref 1–4.8)
LYMPHOCYTES NFR BLD: 53.3 % (ref 18–48)
MAGNESIUM SERPL-MCNC: 2.2 MG/DL (ref 1.6–2.6)
MCH RBC QN AUTO: 32.7 PG (ref 27–31)
MCHC RBC AUTO-ENTMCNC: 32.8 G/DL (ref 32–36)
MCV RBC AUTO: 100 FL (ref 82–98)
MONOCYTES # BLD AUTO: 0.5 K/UL (ref 0.3–1)
MONOCYTES NFR BLD: 11 % (ref 4–15)
NEUTROPHILS # BLD AUTO: 1.5 K/UL (ref 1.8–7.7)
NEUTROPHILS NFR BLD: 33.1 % (ref 38–73)
NRBC BLD-RTO: 0 /100 WBC
PHOSPHATE SERPL-MCNC: 3.1 MG/DL (ref 2.7–4.5)
PLATELET # BLD AUTO: 221 K/UL (ref 150–450)
PMV BLD AUTO: 10 FL (ref 9.2–12.9)
POTASSIUM SERPL-SCNC: 4.1 MMOL/L (ref 3.5–5.1)
PROT SERPL-MCNC: 6.8 G/DL (ref 6–8.4)
RBC # BLD AUTO: 4.46 M/UL (ref 4.6–6.2)
SODIUM SERPL-SCNC: 141 MMOL/L (ref 136–145)
WBC # BLD AUTO: 4.45 K/UL (ref 3.9–12.7)

## 2023-10-11 PROCEDURE — G0378 HOSPITAL OBSERVATION PER HR: HCPCS

## 2023-10-11 PROCEDURE — 95700 EEG CONT REC W/VID EEG TECH: CPT

## 2023-10-11 PROCEDURE — 85025 COMPLETE CBC W/AUTO DIFF WBC: CPT | Performed by: FAMILY MEDICINE

## 2023-10-11 PROCEDURE — 80053 COMPREHEN METABOLIC PANEL: CPT | Performed by: FAMILY MEDICINE

## 2023-10-11 PROCEDURE — 95714 VEEG EA 12-26 HR UNMNTR: CPT

## 2023-10-11 PROCEDURE — 25000003 PHARM REV CODE 250: Performed by: FAMILY MEDICINE

## 2023-10-11 PROCEDURE — 99233 SBSQ HOSP IP/OBS HIGH 50: CPT | Mod: ,,, | Performed by: HOSPITALIST

## 2023-10-11 PROCEDURE — 99215 PR OFFICE/OUTPT VISIT, EST, LEVL V, 40-54 MIN: ICD-10-PCS | Mod: FS,,, | Performed by: PSYCHIATRY & NEUROLOGY

## 2023-10-11 PROCEDURE — 95720 PR EEG, W/VIDEO, CONT RECORD, I&R, >12<26 HRS: ICD-10-PCS | Mod: ,,, | Performed by: PSYCHIATRY & NEUROLOGY

## 2023-10-11 PROCEDURE — 99233 PR SUBSEQUENT HOSPITAL CARE,LEVL III: ICD-10-PCS | Mod: ,,, | Performed by: HOSPITALIST

## 2023-10-11 PROCEDURE — 99499 NO LOS: ICD-10-PCS | Mod: ,,, | Performed by: PHYSICIAN ASSISTANT

## 2023-10-11 PROCEDURE — 25000003 PHARM REV CODE 250: Performed by: HOSPITALIST

## 2023-10-11 PROCEDURE — 99499 UNLISTED E&M SERVICE: CPT | Mod: ,,, | Performed by: PHYSICIAN ASSISTANT

## 2023-10-11 PROCEDURE — 84100 ASSAY OF PHOSPHORUS: CPT | Performed by: FAMILY MEDICINE

## 2023-10-11 PROCEDURE — 99215 OFFICE O/P EST HI 40 MIN: CPT | Mod: FS,,, | Performed by: PSYCHIATRY & NEUROLOGY

## 2023-10-11 PROCEDURE — 36415 COLL VENOUS BLD VENIPUNCTURE: CPT | Performed by: FAMILY MEDICINE

## 2023-10-11 PROCEDURE — 83735 ASSAY OF MAGNESIUM: CPT | Performed by: FAMILY MEDICINE

## 2023-10-11 PROCEDURE — 95720 EEG PHY/QHP EA INCR W/VEEG: CPT | Mod: ,,, | Performed by: PSYCHIATRY & NEUROLOGY

## 2023-10-11 RX ORDER — ACETAMINOPHEN 500 MG
1000 TABLET ORAL EVERY 8 HOURS PRN
Status: DISCONTINUED | OUTPATIENT
Start: 2023-10-11 | End: 2023-10-12 | Stop reason: HOSPADM

## 2023-10-11 RX ORDER — MELOXICAM 7.5 MG/1
7.5 TABLET ORAL 2 TIMES DAILY
Status: DISCONTINUED | OUTPATIENT
Start: 2023-10-11 | End: 2023-10-12 | Stop reason: HOSPADM

## 2023-10-11 RX ADMIN — VERAPAMIL HYDROCHLORIDE 80 MG: 40 TABLET ORAL at 03:10

## 2023-10-11 RX ADMIN — MELOXICAM 7.5 MG: 7.5 TABLET ORAL at 03:10

## 2023-10-11 RX ADMIN — ZONISAMIDE 300 MG: 100 CAPSULE ORAL at 09:10

## 2023-10-11 RX ADMIN — ACETAMINOPHEN 1000 MG: 500 TABLET ORAL at 06:10

## 2023-10-11 RX ADMIN — MELOXICAM 7.5 MG: 7.5 TABLET ORAL at 09:10

## 2023-10-11 RX ADMIN — Medication 6 MG: at 09:10

## 2023-10-11 RX ADMIN — VERAPAMIL HYDROCHLORIDE 80 MG: 40 TABLET ORAL at 09:10

## 2023-10-11 NOTE — ASSESSMENT & PLAN NOTE
"- Reports persistent headache for 1-2 months with associated ipsilateral rhinorrhea/congestion consistent with Cluster type HA  - continue home Verapamil 80mg TID  - Neurology consulted ; appreciate recs  - symptomatic management  - started mobic 7.5 BID and tylenol PRN per neuro  - incidental finding on CTH of "extracranial soft tissue swelling suggested overlying the frontal region for which clinical and historical correlation is needed.  In addition overlying the right zygomatic arch as seen on series 3 axial image 16 there is a small oval finding within the extracranial soft tissues measuring up to approximately 11.6 mm in size, this measures approximately 8.9 Hounsfield units, this may relate to a small sebaceous cyst, clinical and historical correlation is needed." Needs to be examined after EEG cap is removed.      "

## 2023-10-11 NOTE — HPI
"Mr. Meza is a 53 year old man with intractable epilepsy since infancy who presented to ER from Neurology clinic for evaluation and management of frequent breakthrough seizures. Patient is currently maintained on Levetiracetam 1500 mg TID, Zonisamide 300 mg qHS and continues to have generalized convulsions multiple times per week, in addition to events of "blacking out" and wandering off daily. He additionally reports intermittent myoclonic jerks that at times precede his seizures. He endorses triggers of overheating and watching too much tv. He reports that his mood has been poor for many years, and that he is quick to anger in addition to feeling depressed with passive suicidal ideation with no plan. Patient additionally reports daily headaches, worse after his seizures, for which he takes Verapamil 80 mg TID. Patient reports no family history of seizures. Prior EEG report at OSH with 3-4 Hz generalized discharges. CTH 10/10 with no acute intracranial findings. Admitted for event capture, characterization, and medication optimization.    HPI 10/7/2021 clinic visit with Maria R Blankenship PA-C:      Age of first seizure: born with seizures, seizures as an infant   Seizure Risk Factors: no family history of seizures, term, unclear about details of birth but does not think he stayed in the hospital long, no CNS infections, several head strikes with seizures only   Time of Last Seizure: week ago   # of lifetime Seizures: 1000+  Frequency of Seizures:  At most 2 in a day, 1-2 seizures per month  Seizure Triggers:  Overheating, strong smells like perfume and gasoline, lights and too much TV, etoh  Injuries/Hospitalization for seizures? Head strike, tongue bite, fractured arms, lost teeth (had to get teeth pulled)  Bone Health: Dexa at [facility] but does not know results      Auras:  Headache and "weird feeling" -> space out, will start stuttering      Seizure Events:   1. Generalized convusions, Banging head back, eye roll " back, does not want anyone to touch him, will throw people around, urinary incontinence, tired afterwards, couple days to get back to normal, sore afterwards with a persistent headache   2. Black out, wander off, walk across the stress, act funny, can be combative, does not want to be touched, very tired     Current AED/SEs:  1. Levetiracetam 1000 mg twice daily SE still having seizures but frequently misses doses     Previous AED/SEs or reason for DC.   1. Phenytoin SE dental issues      EEG: done at [facility] -> 3-4 Hz discharges per care everywhere tab     CT 2011: normal      Other Allergies: none      AED compliance, adherence: misses some doses

## 2023-10-11 NOTE — ASSESSMENT & PLAN NOTE
53 year old man with intractable generalized epilepsy since childhood with frequent generalized convulsions and dyscognitive episodes with bizarre behaviors admitted for seizure capture, characterization, and medication optimization. Currently maintained on Levetiracetam 1500 mg TID and Zonisamide 300 mg nightly with continued uncontrolled seizures.     - Continuous vEEG   - Recommend to hold home Levetiracetam   - AED levels drawn on admission   - Activation procedures per protocol- medication adjustments as above  - IV Ativan PRN for GTC greater than 5 min - please call epilepsy on call   - Seizure precautions, suction and oxygen at bedside  - Fall precautions, side rail padding in place  - Visi monitoring with continuous pulse oximetry   - Telemetry  - Plan to transfer to EMU service beginning 10/12 at 0700

## 2023-10-11 NOTE — PLAN OF CARE
Problem: Adult Inpatient Plan of Care  Goal: Plan of Care Review  Outcome: Ongoing, Progressing  Goal: Patient-Specific Goal (Individualized)  10/11/2023 1639 by Aiden Khan RN  Outcome: Ongoing, Progressing  10/11/2023 1639 by Aiden Khan RN  Outcome: Ongoing, Progressing  Goal: Optimal Comfort and Wellbeing  10/11/2023 1639 by Aiden Khan RN  Outcome: Ongoing, Progressing  10/11/2023 1639 by Aiden Khan RN  Outcome: Ongoing, Progressing     Problem: Seizure, Active Management  Goal: Absence of Seizure/Seizure-Related Injury  Outcome: Ongoing, Progressing   POC reviewed and updated with the patient. Questions regarding POC were encouraged and addressed with the patient.  VSS, see flow-sheets. Tele maintained per order.  VISI applied/ maintained for additional monitoring. Patient is AO X 4 at this time. Continuous EEG in place. Fall/safety precautions maintained, no signs of injury noted during shift. Seizure precautions maintained. Patient was repositioned for comfort, bed locked in low position with side rails X 4, bed alarm on, with call light within reach. Instructed patient to call staff for mobility, verbalized understanding.  No acute signs of distress noted at this time. Pt had no seizure kind of events during  shift.

## 2023-10-11 NOTE — SUBJECTIVE & OBJECTIVE
Interval History: NAEON. Labs completely normal. Discussed w neuro epilepsy consult team in person; stop keppra, continue zonisamide.  F/u EEG (in place).  For HA, start mobic 7.5 BID and tylenol PRN.  Nursing communication to call Neuro Epilepsy as 1st call for seizure activity (Epic On Call Finder 24/7). Transfer to Neuro Epilepsy primary service with Dr TOMÁS Núñez at 0700 tomorrow.    Review of Systems   Constitutional:  Negative for chills, fatigue and fever.   HENT:  Positive for rhinorrhea. Negative for sore throat and trouble swallowing.    Eyes:  Negative for photophobia and visual disturbance.   Respiratory:  Negative for cough, shortness of breath and wheezing.    Cardiovascular:  Negative for chest pain, palpitations and leg swelling.   Gastrointestinal:  Negative for abdominal distention, abdominal pain, diarrhea, nausea and vomiting.   Genitourinary:  Negative for dysuria and hematuria.   Musculoskeletal:  Negative for neck pain and neck stiffness.   Skin:  Negative for rash and wound.   Neurological:  Positive for headaches. Negative for seizures, syncope, weakness and numbness.   Psychiatric/Behavioral:  Negative for confusion and decreased concentration.      Objective:     Vital Signs (Most Recent):  Temp: 97.9 °F (36.6 °C) (10/11/23 0804)  Pulse: 62 (10/11/23 0804)  Resp: 18 (10/11/23 0804)  BP: (!) 149/89 (10/11/23 0804)  SpO2: 100 % (10/11/23 0804) Vital Signs (24h Range):  Temp:  [97.6 °F (36.4 °C)-98.5 °F (36.9 °C)] 97.9 °F (36.6 °C)  Pulse:  [55-87] 62  Resp:  [11-18] 18  SpO2:  [95 %-100 %] 100 %  BP: (125-196)/() 149/89     Weight: 73.8 kg (162 lb 11.2 oz)  Body mass index is 22.07 kg/m².    Intake/Output Summary (Last 24 hours) at 10/11/2023 0817  Last data filed at 10/10/2023 1933  Gross per 24 hour   Intake 1000 ml   Output --   Net 1000 ml         Physical Exam  Constitutional:       General: He is not in acute distress.     Appearance: He is not toxic-appearing or diaphoretic.   HENT:       Head: Normocephalic and atraumatic.      Nose: Nose normal.   Eyes:      General: No scleral icterus.     Extraocular Movements: Extraocular movements intact.      Pupils: Pupils are equal, round, and reactive to light.   Cardiovascular:      Rate and Rhythm: Normal rate and regular rhythm.   Pulmonary:      Effort: Pulmonary effort is normal. No respiratory distress.      Breath sounds: No wheezing or rales.   Abdominal:      General: Abdomen is flat. There is no distension.      Palpations: Abdomen is soft.      Tenderness: There is no abdominal tenderness. There is no guarding.   Musculoskeletal:         General: Normal range of motion.      Cervical back: Normal range of motion and neck supple.      Right lower leg: No edema.      Left lower leg: No edema.   Lymphadenopathy:      Cervical: No cervical adenopathy.   Skin:     General: Skin is warm and dry.      Coloration: Skin is not jaundiced.   Neurological:      General: No focal deficit present.      Mental Status: He is alert and oriented to person, place, and time.      Cranial Nerves: No cranial nerve deficit.   Psychiatric:         Mood and Affect: Mood normal.         Behavior: Behavior normal.             Significant Labs: All pertinent labs within the past 24 hours have been reviewed.  BMP:   Recent Labs   Lab 10/11/23  0349   GLU 82      K 4.1      CO2 25   BUN 11   CREATININE 1.1   CALCIUM 9.5   MG 2.2     CBC:   Recent Labs   Lab 10/10/23  1749 10/11/23  0349   WBC 4.74 4.45   HGB 14.8 14.6   HCT 45.9 44.5    221     CMP:   Recent Labs   Lab 10/10/23  1749 10/11/23  0349    141   K 4.4 4.1    109   CO2 27 25   GLU 79 82   BUN 11 11   CREATININE 0.9 1.1   CALCIUM 10.1 9.5   PROT 8.0 6.8   ALBUMIN 4.4 3.8   BILITOT 0.5 0.5   ALKPHOS 78 73   AST 15 12   ALT 10 7*   ANIONGAP 9 7*     Magnesium:   Recent Labs   Lab 10/11/23  0349   MG 2.2       Significant Imaging: I have reviewed all pertinent imaging  results/findings within the past 24 hours.

## 2023-10-11 NOTE — ASSESSMENT & PLAN NOTE
- Reports persistent headache for 1-2 months with associated ipsilateral rhinorrhea/congestion consistent with Cluster type HA  - continue home Verapamil 80mg TID  - CTH pending  - Neurology consulted ; appreciate recs  - symptomatic management

## 2023-10-11 NOTE — NURSING
..Nurses Note -- 4 Eyes      10/11/2023   6:49 AM      Skin assessed during: Admit      [x] No Altered Skin Integrity Present    []Prevention Measures Documented      [] Yes- Altered Skin Integrity Present or Discovered   [] LDA Added if Not in Epic (Describe Wound)   [] New Altered Skin Integrity was Present on Admit and Documented in LDA   [] Wound Image Taken    Wound Care Consulted? No    Attending Nurse:  Juvenal Darling RN/Staff Member:

## 2023-10-11 NOTE — ED PROVIDER NOTES
"Encounter Date: 10/10/2023       History     Chief Complaint   Patient presents with    Referral     Pt presents from neurology clinic. States he was told to come to ED to get admitted for EEG monitoring d/t increased seizure-like activity. Reports medication compliance. States he had a seizure earlier today; states he "blacked out."     This patient is a 53-year-old male Past Medical History:  10/11/2023: Paroxysmal SVT (supraventricular tachycardia)  No date: Seizures  With breakthrough seizures and a lifetime history of epilepsy presenting at the recommendations of the a PP in her neurology clinic.  She reports presenting there today for ongoing seizure activity.  He reports his seizures are associated with episodes of "blacking out".  He reports sometimes he gets a severe left-sided headache and will stare off.  He reports he takes 1500 mg of Keppra 3 times a day and is supposed to take 300 mg of zonisamide at night.  He reports they want him to be admitted for any EEG in an attempt to alter his medication regimen to decrease seizure recurrence.  He denies recent fever, cough, chest pain, difficulty breathing, change in strength or sensation in the extremities, abdominal pain, new back pain, confusion.      Review of patient's allergies indicates:  No Known Allergies  Past Medical History:   Diagnosis Date    Paroxysmal SVT (supraventricular tachycardia) 10/11/2023    Seizures      No past surgical history on file.  No family history on file.  Social History     Tobacco Use    Smoking status: Every Day     Current packs/day: 1.00     Types: Cigarettes    Smokeless tobacco: Never   Substance Use Topics    Alcohol use: Yes     Comment: every now and then    Drug use: No     Review of Systems   Constitutional:  Negative for fever.   HENT:  Negative for congestion.    Respiratory:  Negative for shortness of breath.    Cardiovascular:  Negative for chest pain.   Gastrointestinal:  Negative for abdominal pain. "   Genitourinary:  Negative for dysuria.   Musculoskeletal:  Negative for joint swelling.   Skin:  Negative for rash.   Neurological:  Positive for seizures and headaches.   Hematological:  Does not bruise/bleed easily.   Psychiatric/Behavioral:  Negative for confusion.        Physical Exam     Initial Vitals [10/10/23 1616]   BP Pulse Resp Temp SpO2   (!) 179/117 85 18 97.9 °F (36.6 °C) 95 %      MAP       --         Physical Exam    Nursing note and vitals reviewed.  Constitutional: Vital signs are normal. He appears well-nourished.   HENT:   Head: Normocephalic and atraumatic.   Eyes: Conjunctivae and EOM are normal.   Neck: Neck supple. No thyroid mass present.   Normal range of motion.  Cardiovascular:  Normal rate, regular rhythm and normal heart sounds.     Exam reveals no gallop and no friction rub.       No murmur heard.  Pulmonary/Chest: Breath sounds normal. He has no wheezes. He has no rhonchi. He has no rales.   Abdominal: Abdomen is soft. Bowel sounds are normal. There is no abdominal tenderness.   Musculoskeletal:      Cervical back: Normal range of motion and neck supple.     Neurological: He is alert and oriented to person, place, and time. He has normal strength. No cranial nerve deficit or sensory deficit.   Skin: Skin is warm and dry. No rash noted. No erythema.   Psychiatric: He has a normal mood and affect. His speech is normal. Cognition and memory are normal.         ED Course   Procedures  Labs Reviewed   CBC W/ AUTO DIFFERENTIAL - Abnormal; Notable for the following components:       Result Value    MCH 31.4 (*)     All other components within normal limits   HIV 1 / 2 ANTIBODY    Narrative:     Release to patient->Immediate   HEPATITIS C ANTIBODY    Narrative:     Release to patient->Immediate   COMPREHENSIVE METABOLIC PANEL   LEVETIRACETAM  (KEPPRA) LEVEL   POCT GLUCOSE   POCT GLUCOSE MONITORING CONTINUOUS        ECG Results              EKG 12-lead (Final result)  Result time 10/11/23  08:49:01      Final result by Interface, Lab In [facility] (10/11/23 08:49:01)                   Narrative:    Test Reason : R56.9,    Vent. Rate : 066 BPM     Atrial Rate : 066 BPM     P-R Int : 146 ms          QRS Dur : 084 ms      QT Int : 374 ms       P-R-T Axes : 066 030 048 degrees     QTc Int : 392 ms    Normal sinus rhythm  Normal ECG  No previous ECGs available  Confirmed by ALLEN MCKEON MD ([id number]) on 10/11/2023 8:48:48 AM    Referred By: [email or web address]   SELF           Confirmed By:ALLEN MCKEON MD                                  Imaging Results              CT Head Without Contrast (Final result)  Result time 10/11/23 02:47:55      Final result by Reyes Kline MD (10/11/23 02:47:55)                   Impression:      There is no evidence for acute intracranial process.    Extracranial soft tissue findings as discussed above.    Mild paranasal sinus findings.      Electronically signed by: Reyes Kline  Date:    10/11/2023  Time:    02:47               Narrative:    EXAMINATION:  CT HEAD WITHOUT CONTRAST    CLINICAL HISTORY:  Headache, chronic, new features or increased frequency;    TECHNIQUE:  Low dose axial images were obtained through the head.  Coronal and sagittal reformations were also performed. Contrast was not administered.    COMPARISON:  CT examination of the brain without contrast October 12, 2011    FINDINGS:  The ventricular system, sulcal pattern and parenchymal attenuation characteristics appear appropriate for age.  There is no evidence for intracranial mass, mass effect or midline shift.  There is no evidence for acute intracranial hemorrhage.  Appropriate CSF spaces are seen at the skull base.    There is appearance of extracranial soft tissue swelling suggested overlying the frontal region for which clinical and historical correlation is needed.  In addition overlying the right zygomatic arch as seen on series 3 axial image 16 there is a small oval finding within the extracranial  soft tissues measuring up to approximately 11.6 mm in size, this measures approximately 8.9 Hounsfield units, this may relate to a small sebaceous cyst, clinical and historical correlation is needed.  The visualized orbits appear intact.  The mastoid air cells appear well aerated.  Mild paranasal sinus mucosal thickening is noted, suspected small mucous retention cyst of the left maxillary antrum noted.  The visualized osseous structures appear intact.                                       Medications   verapamiL tablet 80 mg (80 mg Oral Given 10/11/23 1529)   zonisamide capsule 300 mg (300 mg Oral Given 10/10/23 2219)   sodium chloride 0.9% flush 10 mL (has no administration in time range)   naloxone 0.4 mg/mL injection 0.02 mg (has no administration in time range)   glucose chewable tablet 16 g (has no administration in time range)   glucose chewable tablet 24 g (has no administration in time range)   glucagon (human recombinant) injection 1 mg (has no administration in time range)   ketorolac injection 15 mg (has no administration in time range)   morphine injection 3 mg (has no administration in time range)   ondansetron injection 4 mg (has no administration in time range)   albuterol inhaler 2 puff (has no administration in time range)   melatonin tablet 6 mg (has no administration in time range)   aluminum-magnesium hydroxide-simethicone 200-200-20 mg/5 mL suspension 30 mL (has no administration in time range)   dextrose 10% bolus 125 mL 125 mL (has no administration in time range)   dextrose 10% bolus 250 mL 250 mL (has no administration in time range)   LORazepam injection 2 mg (has no administration in time range)   hydrALAZINE tablet 25 mg (25 mg Oral Given 10/10/23 2042)   meloxicam tablet 7.5 mg (7.5 mg Oral Given 10/11/23 1529)   acetaminophen tablet 1,000 mg (has no administration in time range)   ketorolac injection 15 mg (15 mg Intravenous Given 10/10/23 1832)   sodium chloride 0.9% bolus 1,000 mL  1,000 mL (0 mLs Intravenous Stopped 10/10/23 1933)   magnesium sulfate 2g in water 50mL IVPB (premix) (0 g Intravenous Stopped 10/10/23 2132)     Medical Decision Making  Patient rapidly assessed shortly after arrival.  Initial vital signs are stable.  He is alert and neuro intact, not postictal.  Case discussed with neurology who recommends screening labs and the plan will be to admit to hospital medicine for further EEG.  Workup in the ED is largely unremarkable.  He had a stable course without recurrent seizure.  Home meds continued.  Patient in agreement with plan of care.    Risk  Prescription drug management.                               Clinical Impression:   Final diagnoses:  [R56.9] Seizure  [G40.909] Recurrent seizures        ED Disposition Condition    Observation                 Grabiel Ocampo MD  10/11/23 7400

## 2023-10-11 NOTE — SUBJECTIVE & OBJECTIVE
Past Medical History:   Diagnosis Date    Seizures        No past surgical history on file.    Review of patient's allergies indicates:  No Known Allergies    No current facility-administered medications on file prior to encounter.     Current Outpatient Medications on File Prior to Encounter   Medication Sig    levETIRAcetam (KEPPRA) 500 MG Tab Take 3 tablets (1,500 mg total) by mouth 3 (three) times daily.    rimegepant (NURTEC) 75 mg odt Take 75 mg by mouth once as needed for Migraine. Place ODT tablet on the tongue; alternatively the ODT tablet may be placed under the tongue    verapamiL (CALAN) 80 MG tablet Take 80 mg by mouth 3 (three) times daily.    zonisamide (ZONEGRAN) 100 MG Cap Take 2 capsules (200 mg total) by mouth every evening.     Family History    None       Tobacco Use    Smoking status: Every Day     Current packs/day: 1.00     Types: Cigarettes    Smokeless tobacco: Never   Substance and Sexual Activity    Alcohol use: Yes     Comment: every now and then    Drug use: No    Sexual activity: Not on file     Review of Systems   Constitutional:  Negative for chills, fatigue and fever.   HENT:  Positive for rhinorrhea. Negative for sore throat and trouble swallowing.    Eyes:  Negative for photophobia and visual disturbance.   Respiratory:  Negative for cough, shortness of breath and wheezing.    Cardiovascular:  Negative for chest pain, palpitations and leg swelling.   Gastrointestinal:  Negative for abdominal distention, abdominal pain, diarrhea, nausea and vomiting.   Genitourinary:  Negative for dysuria and hematuria.   Musculoskeletal:  Negative for neck pain and neck stiffness.   Skin:  Negative for rash and wound.   Neurological:  Positive for headaches. Negative for seizures, syncope, weakness and numbness.   Psychiatric/Behavioral:  Negative for confusion and decreased concentration.      Objective:     Vital Signs (Most Recent):  Temp: 97.9 °F (36.6 °C) (10/10/23 1616)  Pulse: 69 (10/10/23  1933)  Resp: 14 (10/10/23 1933)  BP: (!) 186/130 (10/10/23 1932)  SpO2: 96 % (10/10/23 1933) Vital Signs (24h Range):  Temp:  [97.9 °F (36.6 °C)] 97.9 °F (36.6 °C)  Pulse:  [66-87] 69  Resp:  [12-18] 14  SpO2:  [95 %-96 %] 96 %  BP: (130-196)/() 186/130     Weight: 73.5 kg (162 lb)  Body mass index is 21.97 kg/m².     Physical Exam  Constitutional:       General: He is not in acute distress.     Appearance: He is not toxic-appearing or diaphoretic.   HENT:      Head: Normocephalic and atraumatic.      Nose: Nose normal.   Eyes:      General: No scleral icterus.     Extraocular Movements: Extraocular movements intact.      Pupils: Pupils are equal, round, and reactive to light.   Cardiovascular:      Rate and Rhythm: Normal rate and regular rhythm.   Pulmonary:      Effort: Pulmonary effort is normal. No respiratory distress.      Breath sounds: No wheezing or rales.   Abdominal:      General: Abdomen is flat. There is no distension.      Palpations: Abdomen is soft.      Tenderness: There is no abdominal tenderness. There is no guarding.   Musculoskeletal:         General: Normal range of motion.      Cervical back: Normal range of motion and neck supple.      Right lower leg: No edema.      Left lower leg: No edema.   Lymphadenopathy:      Cervical: No cervical adenopathy.   Skin:     General: Skin is warm and dry.      Coloration: Skin is not jaundiced.   Neurological:      General: No focal deficit present.      Mental Status: He is alert and oriented to person, place, and time.      Cranial Nerves: No cranial nerve deficit.   Psychiatric:         Mood and Affect: Mood normal.         Behavior: Behavior normal.              CRANIAL NERVES     CN III, IV, VI   Pupils are equal, round, and reactive to light.       Significant Labs: All pertinent labs within the past 24 hours have been reviewed.  CBC:   Recent Labs   Lab 10/10/23  1749   WBC 4.74   HGB 14.8   HCT 45.9        CMP:   Recent Labs   Lab  10/10/23  1749      K 4.4      CO2 27   GLU 79   BUN 11   CREATININE 0.9   CALCIUM 10.1   PROT 8.0   ALBUMIN 4.4   BILITOT 0.5   ALKPHOS 78   AST 15   ALT 10   ANIONGAP 9       Significant Imaging: I have reviewed all pertinent imaging results/findings within the past 24 hours.

## 2023-10-11 NOTE — CONSULTS
"[facility] Epilepsy  Neurology-Epilepsy  Consult Note    Patient Name: Irvin Meza  MRN: [id number]  Admission Date: 10/10/2023  Hospital Length of Stay: 0 days  Code Status: Full Code   Attending Provider: Jasmine Gaspar MD   Consulting Provider: Sandhya Olmstead PA-C  Primary Care Physician: Nayely, Primary Doctor  Principal Problem:Generalized epilepsy, intractable    Inpatient consult to neurology  Consult performed by: Sandhya Olmstead PA-C  Consult ordered by: Grabiel Ocampo MD         Subjective:     HPI:   Mr. Meza is a 53 year old man with intractable epilepsy since infancy who presented to ER from Neurology clinic for evaluation and management of frequent breakthrough seizures. Patient is currently maintained on Levetiracetam 1500 mg TID, Zonisamide 300 mg qHS and continues to have generalized convulsions multiple times per week, in addition to events of "blacking out" and wandering off daily. He additionally reports intermittent myoclonic jerks that at times precede his seizures. He endorses triggers of overheating and watching too much tv. He reports that his mood has been poor for many years, and that he is quick to anger in addition to feeling depressed with passive suicidal ideation with no plan. Patient additionally reports daily headaches, worse after his seizures, for which he takes Verapamil 80 mg TID. Patient reports no family history of seizures. Prior EEG report at OSH with 3-4 Hz generalized discharges. CTH 10/10 with no acute intracranial findings. Admitted for event capture, characterization, and medication optimization.    HPI 10/7/2021 clinic visit with Maria R Blankenship PA-C:      Age of first seizure: born with seizures, seizures as an infant   Seizure Risk Factors: no family history of seizures, term, unclear about details of birth but does not think he stayed in the hospital long, no CNS infections, several head strikes with seizures only   Time of Last Seizure: week ago   # of " "lifetime Seizures: 1000+  Frequency of Seizures:  At most 2 in a day, 1-2 seizures per month  Seizure Triggers:  Overheating, strong smells like perfume and gasoline, lights and too much TV, etoh  Injuries/Hospitalization for seizures? Head strike, tongue bite, fractured arms, lost teeth (had to get teeth pulled)  Bone Health: Dexa at [facility] but does not know results      Auras:  Headache and "weird feeling" -> space out, will start stuttering      Seizure Events:   1. Generalized convusions, Banging head back, eye roll back, does not want anyone to touch him, will throw people around, urinary incontinence, tired afterwards, couple days to get back to normal, sore afterwards with a persistent headache   2. Black out, wander off, walk across the stress, act funny, can be combative, does not want to be touched, very tired     Current AED/SEs:  1. Levetiracetam 1000 mg twice daily SE still having seizures but frequently misses doses     Previous AED/SEs or reason for DC.   1. Phenytoin SE dental issues      EEG: [facility] -> 3-4 Hz discharges per care everywhere tab     CT 2011: normal      Other Allergies: none      AED compliance, adherence: misses some doses          Hospital Course:   No notes on file     Past Medical History:   Diagnosis Date    Seizures        No past surgical history on file.    Review of patient's allergies indicates:  No Known Allergies    No current facility-administered medications on file prior to encounter.     Current Outpatient Medications on File Prior to Encounter   Medication Sig    levETIRAcetam (KEPPRA) 500 MG Tab Take 3 tablets (1,500 mg total) by mouth 3 (three) times daily.    rimegepant (NURTEC) 75 mg odt Take 75 mg by mouth once as needed for Migraine. Place ODT tablet on the tongue; alternatively the ODT tablet may be placed under the tongue    verapamiL (CALAN) 80 MG tablet Take 80 mg by mouth 3 (three) times daily.    zonisamide (ZONEGRAN) 100 MG Cap Take 2 " capsules (200 mg total) by mouth every evening.     Continuous Infusions:    Family History    None       Tobacco Use    Smoking status: Every Day     Current packs/day: 1.00     Types: Cigarettes    Smokeless tobacco: Never   Substance and Sexual Activity    Alcohol use: Yes     Comment: every now and then    Drug use: No    Sexual activity: Not on file     Review of Systems   Constitutional:  Negative for chills and fever.   HENT:  Negative for trouble swallowing and voice change.    Eyes:  Negative for photophobia and visual disturbance.   Respiratory:  Negative for cough, shortness of breath and wheezing.    Cardiovascular:  Negative for chest pain and leg swelling.   Gastrointestinal:  Positive for nausea. Negative for vomiting.   Musculoskeletal:  Negative for neck pain and neck stiffness.   Skin:  Negative for pallor and rash.   Neurological:  Positive for seizures and headaches. Negative for speech difficulty and weakness.   Psychiatric/Behavioral:  Positive for dysphoric mood. Negative for agitation.      Objective:     Vital Signs (Most Recent):  Temp: 98.5 °F (36.9 °C) (10/11/23 1139)  Pulse: 60 (10/11/23 1139)  Resp: 17 (10/11/23 1139)  BP: 133/86 (10/11/23 1139)  SpO2: 99 % (10/11/23 1139) Vital Signs (24h Range):  Temp:  [97.6 °F (36.4 °C)-98.5 °F (36.9 °C)] 98.5 °F (36.9 °C)  Pulse:  [55-87] 60  Resp:  [11-18] 17  SpO2:  [95 %-100 %] 99 %  BP: (125-196)/() 133/86     Weight: 73.8 kg (162 lb 11.2 oz)  Body mass index is 22.07 kg/m².     Physical Exam  Vitals reviewed.   Constitutional:       General: He is not in acute distress.     Appearance: He is not diaphoretic.   HENT:      Head: Normocephalic and atraumatic.      Comments: EEG in place  Eyes:      General: No scleral icterus.     Extraocular Movements: Extraocular movements intact and EOM normal.      Conjunctiva/sclera: Conjunctivae normal.      Pupils: Pupils are equal, round, and reactive to light.   Cardiovascular:      Rate and  Rhythm: Normal rate.   Pulmonary:      Effort: Pulmonary effort is normal. No respiratory distress.   Abdominal:      General: There is no distension.      Palpations: Abdomen is soft.      Tenderness: There is no abdominal tenderness.   Musculoskeletal:         General: No deformity or signs of injury. Normal range of motion.   Skin:     General: Skin is warm and dry.   Neurological:      General: No focal deficit present.      Mental Status: He is alert and oriented to person, place, and time. Mental status is at baseline.      Coordination: Finger-Nose-Finger Test normal.   Psychiatric:         Mood and Affect: Mood is depressed.         Speech: Speech normal.         Behavior: Behavior normal.            NEUROLOGICAL EXAMINATION:     MENTAL STATUS   Oriented to person, place, and time.   Attention: normal. Concentration: normal.   Speech: speech is normal   Level of consciousness: alert    CRANIAL NERVES     CN II   Visual fields full to confrontation.     CN III, IV, VI   Pupils are equal, round, and reactive to light.  Extraocular motions are normal.     CN VII   Facial expression full, symmetric.     CN VIII   Hearing: intact    CN XI   CN XI normal.     CN XII   CN XII normal.     MOTOR EXAM   Muscle bulk: normal  Overall muscle tone: normal    SENSORY EXAM   Light touch normal.   Proprioception normal.     GAIT AND COORDINATION      Coordination   Finger to nose coordination: normal      Significant Labs: All pertinent lab results from the past 24 hours have been reviewed.    Significant Studies: I have reviewed all pertinent imaging results/findings within the past 24 hours.    Assessment and Plan:     * Generalized epilepsy, intractable  53 year old man with intractable generalized epilepsy since childhood with frequent generalized convulsions and dyscognitive episodes with bizarre behaviors admitted for seizure capture, characterization, and medication optimization. Currently maintained on Levetiracetam  1500 mg TID and Zonisamide 300 mg nightly with continued uncontrolled seizures.     - Continuous vEEG   - Recommend to hold home Levetiracetam   - Continue Zonisamide 300 mg qHS  - Levetiracetam, Zonisamide levels pending   - Activation procedures per protocol- medication adjustments as above  - IV Ativan PRN for GTC greater than 5 min - please call epilepsy on call   - Seizure precautions, suction and oxygen at bedside  - Fall precautions, side rail padding in place  - Visi monitoring with continuous pulse oximetry   - Telemetry  - Plan to transfer to EMU service beginning 10/12 at 0700    Headache  - Hx of cluster headaches, worse around his seizures  - Recommend to initiate Meloxicam 7.5 mg BID  - Continue home Verapamil 80 mg TID  - Outpatient follow up in headache clinic        VTE Risk Mitigation (From admission, onward)         Ordered     IP VTE LOW RISK PATIENT  Once         10/10/23 1934     Place sequential compression device  Until discontinued         10/10/23 1934                Thank you for your consult. I will follow-up with patient. Please contact us if you have any additional questions.    Sandhya Olmstead PA-C  Neurology-Epilepsy  [facility]  Staff: Dr. Núñez

## 2023-10-11 NOTE — H&P
"[facility] - Emergency [facility] Medicine  History & Physical    Patient Name: Irvin Meza  MRN: [id number]  Patient Class: OP- Observation  Admission Date: 10/10/2023  Attending Physician: Jasmine Gaspar MD   Primary Care Provider: Nayely Primary Doctor         Patient information was obtained from patient, past medical records and ER records.     Subjective:     Principal Problem:Generalized epilepsy, intractable    Chief Complaint:   Chief Complaint   Patient presents with    Referral     Pt presents from neurology clinic. States he was told to come to ED to get admitted for EEG monitoring d/t increased seizure-like activity. Reports medication compliance. States he had a seizure earlier today; states he "blacked out."        HPI: This is a 52yo male with a past medical history of difficult to control generalized epilepsy currently on keppra and zonisamide and more recent chronic headach with ipsilateral nasal congestion concerning for cluster type with failed control with multiple OP therapies who presents to the ED from Neurology clinic for evaluation of breakthru seizures. Patient currently continues to have convulsions despite medication compliance and also concern for possible mood related changes related to keppra therapy. Seen in Neuro clinic today and recommended present to ED and admission for EEG and AED management/change. Patient does report persistent headache. Denies fever, chills, nausea, vomiting, confusion, chest pain, shortness of breath, weakness. In the ED patient afebrile and hemodynamically stable saturating well on room air. Neurology consulted and patient admitted to the care of medicine for further evaluation and management.       Past Medical History:   Diagnosis Date    Seizures        No past surgical history on file.    Review of patient's allergies indicates:  No Known Allergies    No current facility-administered medications on file prior to encounter.     Current Outpatient " Medications on File Prior to Encounter   Medication Sig    levETIRAcetam (KEPPRA) 500 MG Tab Take 3 tablets (1,500 mg total) by mouth 3 (three) times daily.    rimegepant (NURTEC) 75 mg odt Take 75 mg by mouth once as needed for Migraine. Place ODT tablet on the tongue; alternatively the ODT tablet may be placed under the tongue    verapamiL (CALAN) 80 MG tablet Take 80 mg by mouth 3 (three) times daily.    zonisamide (ZONEGRAN) 100 MG Cap Take 2 capsules (200 mg total) by mouth every evening.     Family History    None       Tobacco Use    Smoking status: Every Day     Current packs/day: 1.00     Types: Cigarettes    Smokeless tobacco: Never   Substance and Sexual Activity    Alcohol use: Yes     Comment: every now and then    Drug use: No    Sexual activity: Not on file     Review of Systems   Constitutional:  Negative for chills, fatigue and fever.   HENT:  Positive for rhinorrhea. Negative for sore throat and trouble swallowing.    Eyes:  Negative for photophobia and visual disturbance.   Respiratory:  Negative for cough, shortness of breath and wheezing.    Cardiovascular:  Negative for chest pain, palpitations and leg swelling.   Gastrointestinal:  Negative for abdominal distention, abdominal pain, diarrhea, nausea and vomiting.   Genitourinary:  Negative for dysuria and hematuria.   Musculoskeletal:  Negative for neck pain and neck stiffness.   Skin:  Negative for rash and wound.   Neurological:  Positive for headaches. Negative for seizures, syncope, weakness and numbness.   Psychiatric/Behavioral:  Negative for confusion and decreased concentration.      Objective:     Vital Signs (Most Recent):  Temp: 97.9 °F (36.6 °C) (10/10/23 1616)  Pulse: 69 (10/10/23 1933)  Resp: 14 (10/10/23 1933)  BP: (!) 186/130 (10/10/23 1932)  SpO2: 96 % (10/10/23 1933) Vital Signs (24h Range):  Temp:  [97.9 °F (36.6 °C)] 97.9 °F (36.6 °C)  Pulse:  [66-87] 69  Resp:  [12-18] 14  SpO2:  [95 %-96 %] 96 %  BP:  (130-196)/() 186/130     Weight: 73.5 kg (162 lb)  Body mass index is 21.97 kg/m².     Physical Exam  Constitutional:       General: He is not in acute distress.     Appearance: He is not toxic-appearing or diaphoretic.   HENT:      Head: Normocephalic and atraumatic.      Nose: Nose normal.   Eyes:      General: No scleral icterus.     Extraocular Movements: Extraocular movements intact.      Pupils: Pupils are equal, round, and reactive to light.   Cardiovascular:      Rate and Rhythm: Normal rate and regular rhythm.   Pulmonary:      Effort: Pulmonary effort is normal. No respiratory distress.      Breath sounds: No wheezing or rales.   Abdominal:      General: Abdomen is flat. There is no distension.      Palpations: Abdomen is soft.      Tenderness: There is no abdominal tenderness. There is no guarding.   Musculoskeletal:         General: Normal range of motion.      Cervical back: Normal range of motion and neck supple.      Right lower leg: No edema.      Left lower leg: No edema.   Lymphadenopathy:      Cervical: No cervical adenopathy.   Skin:     General: Skin is warm and dry.      Coloration: Skin is not jaundiced.   Neurological:      General: No focal deficit present.      Mental Status: He is alert and oriented to person, place, and time.      Cranial Nerves: No cranial nerve deficit.   Psychiatric:         Mood and Affect: Mood normal.         Behavior: Behavior normal.              CRANIAL NERVES     CN III, IV, VI   Pupils are equal, round, and reactive to light.       Significant Labs: All pertinent labs within the past 24 hours have been reviewed.  CBC:   Recent Labs   Lab 10/10/23  1749   WBC 4.74   HGB 14.8   HCT 45.9        CMP:   Recent Labs   Lab 10/10/23  1749      K 4.4      CO2 27   GLU 79   BUN 11   CREATININE 0.9   CALCIUM 10.1   PROT 8.0   ALBUMIN 4.4   BILITOT 0.5   ALKPHOS 78   AST 15   ALT 10   ANIONGAP 9       Significant Imaging: I have reviewed all  pertinent imaging results/findings within the past 24 hours.    Assessment/Plan:     * Generalized epilepsy, intractable  - Neurology consulted ; appreciate recs  - Keppra and Zonisamide serum levels pending  - EEG pending  - CTH pending  - will continue home AED regimen pending Neuro eval/recs: Keppra 1500 TID and zonisamide 300mg HS  - seizure precuations  - ativan prn seizure  - further management pending clinical course and future study review      Headache  - Reports persistent headache for 1-2 months with associated ipsilateral rhinorrhea/congestion consistent with Cluster type HA  - continue home Verapamil 80mg TID  - CTH pending  - Neurology consulted ; appreciate recs  - symptomatic management        VTE Risk Mitigation (From admission, onward)         Ordered     IP VTE LOW RISK PATIENT  Once         10/10/23 1934     Place sequential compression device  Until discontinued         10/10/23 1934                   On 10/10/2023, patient should be placed in hospital observation services under my care.        Harjinder Zuniga MD  Department of Hospital Medicine  [facility] - Emergency Dept

## 2023-10-11 NOTE — HPI
53M with generalized epilepsy (difficult to control) currently on keppra and zonisamide and more recent chronic headache with ipsilateral nasal congestion concerning for cluster type with failed control with multiple OP therapies, and paroxsymal SVT (on CCB), who presents to the ED from Neurology clinic for evaluation of breakthrough seizures. He continues to have convulsions despite medication compliance and also concern for possible mood related changes related to keppra therapy. Seen in Neuro clinic today and recommended present to ED and admission for EEG and AED management/change.     Patient does report persistent headache. Denies fever, chills, nausea, vomiting, confusion, chest pain, shortness of breath, weakness.  States he has intermittent mid-sternal chest pain, and had a recent extensive w/u at [facility] with negive nuclear stress, 2D Echo, and essentially normal 48 hr Holter that revealed one episode of SVT (rate 120s).      In the ED: patient afebrile and hemodynamically stable saturating well on room air. Neurology consulted and patient admitted to the care of Hospital medicine for further evaluation and management.     Of note, this is his first admit to [facility].     Social: tobacco smoking, no EtOH or illicits.

## 2023-10-11 NOTE — PLAN OF CARE
[facility] - Neurosurgery (Hospital)  Discharge Assessment    Primary Care Provider: No, Primary Doctor     Discharge Assessment (most recent)       BRIEF DISCHARGE ASSESSMENT - 10/11/23 4127          Discharge Planning    Assessment Type Discharge Planning Brief Assessment (P)      Support Systems Family members (P)      Equipment Currently Used at Home none (P)      Current Living Arrangements home (P)      Patient/Family Anticipates Transition to home (P)      DME Needed Upon Discharge  none (P)      Discharge Plan A Home (P)                    Patient admitted from Neurology clinic for adjustment of seizure meds.  Patient lives alone in a [facility] with no steps.  He is independent with ambulation and ADL's and has no DME or HH.  Pateint is not on HD or Coumadin.  Patient will discharge home once stable.  His mother can provide support and transporation.      Jesica Mei, GRIFFIN  [facility]  [phone]

## 2023-10-11 NOTE — ASSESSMENT & PLAN NOTE
- Neurology consulted ; appreciate recs  - Keppra and Zonisamide serum levels pending  - EEG pending  - CTH pending  - will continue home AED regimen pending Neuro eval/recs: Keppra 1500 TID and zonisamide 300mg HS  - seizure precuations  - ativan prn seizure  - further management pending clinical course and future study review

## 2023-10-11 NOTE — ASSESSMENT & PLAN NOTE
- Hx of cluster headaches, worse around his seizures  - Recommend to initiate Meloxicam 7.5 mg BID  - Continue home Verapamil 80 mg TID  - Outpatient follow up in headache clinic

## 2023-10-11 NOTE — PROGRESS NOTES
[facility] - Neurosurgery ([facility])  [facility] Medicine  Progress Note    Patient Name: Irvin Meza  MRN: [id number]  Patient Class: OP- Observation   Admission Date: 10/10/2023  Length of Stay: 0 days  Attending Physician: Jasmine Gaspar MD  Primary Care Provider: Nayely, Primary Doctor        Subjective:     Principal Problem:Generalized epilepsy, intractable        HPI:  53M with generalized epilepsy (difficult to control) currently on keppra and zonisamide and more recent chronic headache with ipsilateral nasal congestion concerning for cluster type with failed control with multiple OP therapies, and paroxsymal SVT (on CCB), who presents to the ED from Neurology clinic for evaluation of breakthrough seizures. He continues to have convulsions despite medication compliance and also concern for possible mood related changes related to keppra therapy. Seen in Neuro clinic today and recommended present to ED and admission for EEG and AED management/change.     Patient does report persistent headache. Denies fever, chills, nausea, vomiting, confusion, chest pain, shortness of breath, weakness.  States he has intermittent mid-sternal chest pain, and had a recent extensive w/u at [facility] with negive nuclear stress, 2D Echo, and essentially normal 48 hr Holter that revealed one episode of SVT (rate 120s).      In the ED: patient afebrile and hemodynamically stable saturating well on room air. Neurology consulted and patient admitted to the care of Hospital medicine for further evaluation and management.     Of note, this is [place] admit to [facility].     Social: tobacco smoking, no EtOH or illicits.       Overview/Hospital Course:  Neuro epilepsy consulted.        Interval History: NAEON. Labs completely normal. Discussed w neuro epilepsy consult team in person; stop keppra, continue zonisamide.  F/u EEG (in place).  For HA, start mobic 7.5 BID and tylenol PRN.  Nursing communication to call Neuro Epilepsy as 1st call for seizure  activity (Epic On Call Finder 24/7). Transfer to Neuro Epilepsy primary service with Dr TOMÁS Núñez at 0700 tomorrow.    Review of Systems   Constitutional:  Negative for chills, fatigue and fever.   HENT:  Positive for rhinorrhea. Negative for sore throat and trouble swallowing.    Eyes:  Negative for photophobia and visual disturbance.   Respiratory:  Negative for cough, shortness of breath and wheezing.    Cardiovascular:  Negative for chest pain, palpitations and leg swelling.   Gastrointestinal:  Negative for abdominal distention, abdominal pain, diarrhea, nausea and vomiting.   Genitourinary:  Negative for dysuria and hematuria.   Musculoskeletal:  Negative for neck pain and neck stiffness.   Skin:  Negative for rash and wound.   Neurological:  Positive for headaches. Negative for seizures, syncope, weakness and numbness.   Psychiatric/Behavioral:  Negative for confusion and decreased concentration.      Objective:     Vital Signs (Most Recent):  Temp: 97.9 °F (36.6 °C) (10/11/23 0804)  Pulse: 62 (10/11/23 0804)  Resp: 18 (10/11/23 0804)  BP: (!) 149/89 (10/11/23 0804)  SpO2: 100 % (10/11/23 0804) Vital Signs (24h Range):  Temp:  [97.6 °F (36.4 °C)-98.5 °F (36.9 °C)] 97.9 °F (36.6 °C)  Pulse:  [55-87] 62  Resp:  [11-18] 18  SpO2:  [95 %-100 %] 100 %  BP: (125-196)/() 149/89     Weight: 73.8 kg (162 lb 11.2 oz)  Body mass index is 22.07 kg/m².    Intake/Output Summary (Last 24 hours) at 10/11/2023 0817  Last data filed at 10/10/2023 1933  Gross per 24 hour   Intake 1000 ml   Output --   Net 1000 ml         Physical Exam  Constitutional:       General: He is not in acute distress.     Appearance: He is not toxic-appearing or diaphoretic.   HENT:      Head: Normocephalic and atraumatic.      Nose: Nose normal.   Eyes:      General: No scleral icterus.     Extraocular Movements: Extraocular movements intact.      Pupils: Pupils are equal, round, and reactive to light.   Cardiovascular:      Rate and Rhythm:  Normal rate and regular rhythm.   Pulmonary:      Effort: Pulmonary effort is normal. No respiratory distress.      Breath sounds: No wheezing or rales.   Abdominal:      General: Abdomen is flat. There is no distension.      Palpations: Abdomen is soft.      Tenderness: There is no abdominal tenderness. There is no guarding.   Musculoskeletal:         General: Normal range of motion.      Cervical back: Normal range of motion and neck supple.      Right lower leg: No edema.      Left lower leg: No edema.   Lymphadenopathy:      Cervical: No cervical adenopathy.   Skin:     General: Skin is warm and dry.      Coloration: Skin is not jaundiced.   Neurological:      General: No focal deficit present.      Mental Status: He is alert and oriented to person, place, and time.      Cranial Nerves: No cranial nerve deficit.   Psychiatric:         Mood and Affect: Mood normal.         Behavior: Behavior normal.             Significant Labs: All pertinent labs within the past 24 hours have been reviewed.  BMP:   Recent Labs   Lab 10/11/23  0349   GLU 82      K 4.1      CO2 25   BUN 11   CREATININE 1.1   CALCIUM 9.5   MG 2.2     CBC:   Recent Labs   Lab 10/10/23  1749 10/11/23  0349   WBC 4.74 4.45   HGB 14.8 14.6   HCT 45.9 44.5    221     CMP:   Recent Labs   Lab 10/10/23  1749 10/11/23  0349    141   K 4.4 4.1    109   CO2 27 25   GLU 79 82   BUN 11 11   CREATININE 0.9 1.1   CALCIUM 10.1 9.5   PROT 8.0 6.8   ALBUMIN 4.4 3.8   BILITOT 0.5 0.5   ALKPHOS 78 73   AST 15 12   ALT 10 7*   ANIONGAP 9 7*     Magnesium:   Recent Labs   Lab 10/11/23  0349   MG 2.2       Significant Imaging: I have reviewed all pertinent imaging results/findings within the past 24 hours.      Assessment/Plan:      * Generalized epilepsy, intractable  Ct head negative for acute process.  Home AED regimen: Keppra 1500 TID and zonisamide 300mg HS  - Neurology consulted ; appreciate recs  - f/u EEG   - continue home  "zonisamide per neuro  - stop home keppra per neuro  - f/u Keppra and Zonisamide serum levels; pts states he is compliant with meds  - seizure precuations  - ativan prn seizure  - transfered to EMU bed 909; transfer primary service to Epilepsy with Dr Chase Núñez tomorrow 0700      Paroxysmal SVT (supraventricular tachycardia)  - home CCB Verapamil      Intermittent chest pain  - [place] [facility] [place] denies GERD, and Sx do not occur postprandial.   - if CP recurs, would check EKG and trop      Headache  - Reports persistent headache for 1-2 months with associated ipsilateral rhinorrhea/congestion consistent with Cluster type HA  - continue home Verapamil 80mg TID  - Neurology consulted ; appreciate recs  - symptomatic management  - started mobic 7.5 BID and tylenol PRN per neuro  - incidental finding on CTH of "extracranial soft tissue swelling suggested overlying the frontal region for which clinical and historical correlation is needed.  In addition overlying the right zygomatic arch as seen on series 3 axial image 16 there is a small oval finding within the extracranial soft tissues measuring up to approximately 11.6 mm in size, this measures approximately 8.9 Hounsfield units, this may relate to a small sebaceous cyst, clinical and historical correlation is needed." Needs to be examined after EEG cap is removed.          VTE Risk Mitigation (From admission, onward)         Ordered     IP VTE LOW RISK PATIENT  Once         10/10/23 1934     Place sequential compression device  Until discontinued         10/10/23 1934                Discharge Planning   KAREN:      Code Status: Full Code   Is the patient medically ready for discharge?:     Reason for patient still in hospital (select all that apply): Patient trending condition                     Jasmine Gaspar MD  Department of Hospital Medicine   [facility] - Neurosurgery (Hospital)    "

## 2023-10-11 NOTE — ASSESSMENT & PLAN NOTE
- extensive workup at [facility] recently. He denies GERD, and Sx do not occur postprandial.   - if CP recurs, would check EKG and trop

## 2023-10-11 NOTE — SUBJECTIVE & OBJECTIVE
Past Medical History:   Diagnosis Date    Seizures        No past surgical history on file.    Review of patient's allergies indicates:  No Known Allergies    No current facility-administered medications on file prior to encounter.     Current Outpatient Medications on File Prior to Encounter   Medication Sig    levETIRAcetam (KEPPRA) 500 MG Tab Take 3 tablets (1,500 mg total) by mouth 3 (three) times daily.    rimegepant (NURTEC) 75 mg odt Take 75 mg by mouth once as needed for Migraine. Place ODT tablet on the tongue; alternatively the ODT tablet may be placed under the tongue    verapamiL (CALAN) 80 MG tablet Take 80 mg by mouth 3 (three) times daily.    zonisamide (ZONEGRAN) 100 MG Cap Take 2 capsules (200 mg total) by mouth every evening.     Continuous Infusions:    Family History    None       Tobacco Use    Smoking status: Every Day     Current packs/day: 1.00     Types: Cigarettes    Smokeless tobacco: Never   Substance and Sexual Activity    Alcohol use: Yes     Comment: every now and then    Drug use: No    Sexual activity: Not on file     Review of Systems   Constitutional:  Negative for chills and fever.   HENT:  Negative for trouble swallowing and voice change.    Eyes:  Negative for photophobia and visual disturbance.   Respiratory:  Negative for cough, shortness of breath and wheezing.    Cardiovascular:  Negative for chest pain and leg swelling.   Gastrointestinal:  Positive for nausea. Negative for vomiting.   Musculoskeletal:  Negative for neck pain and neck stiffness.   Skin:  Negative for pallor and rash.   Neurological:  Positive for seizures and headaches. Negative for speech difficulty and weakness.   Psychiatric/Behavioral:  Positive for dysphoric mood. Negative for agitation.      Objective:     Vital Signs (Most Recent):  Temp: 98.5 °F (36.9 °C) (10/11/23 1139)  Pulse: 60 (10/11/23 1139)  Resp: 17 (10/11/23 1139)  BP: 133/86 (10/11/23 1139)  SpO2: 99 % (10/11/23 1139) Vital Signs (24h  Range):  Temp:  [97.6 °F (36.4 °C)-98.5 °F (36.9 °C)] 98.5 °F (36.9 °C)  Pulse:  [55-87] 60  Resp:  [11-18] 17  SpO2:  [95 %-100 %] 99 %  BP: (125-196)/() 133/86     Weight: 73.8 kg (162 lb 11.2 oz)  Body mass index is 22.07 kg/m².     Physical Exam  Vitals reviewed.   Constitutional:       General: He is not in acute distress.     Appearance: He is not diaphoretic.   HENT:      Head: Normocephalic and atraumatic.      Comments: EEG in place  Eyes:      General: No scleral icterus.     Extraocular Movements: Extraocular movements intact and EOM normal.      Conjunctiva/sclera: Conjunctivae normal.      Pupils: Pupils are equal, round, and reactive to light.   Cardiovascular:      Rate and Rhythm: Normal rate.   Pulmonary:      Effort: Pulmonary effort is normal. No respiratory distress.   Abdominal:      General: There is no distension.      Palpations: Abdomen is soft.      Tenderness: There is no abdominal tenderness.   Musculoskeletal:         General: No deformity or signs of injury. Normal range of motion.   Skin:     General: Skin is warm and dry.   Neurological:      General: No focal deficit present.      Mental Status: He is alert and oriented to person, place, and time. Mental status is at baseline.      Coordination: Finger-Nose-Finger Test normal.   Psychiatric:         Mood and Affect: Mood is depressed.         Speech: Speech normal.         Behavior: Behavior normal.            NEUROLOGICAL EXAMINATION:     MENTAL STATUS   Oriented to person, place, and time.   Attention: normal. Concentration: normal.   Speech: speech is normal   Level of consciousness: alert    CRANIAL NERVES     CN II   Visual fields full to confrontation.     CN III, IV, VI   Pupils are equal, round, and reactive to light.  Extraocular motions are normal.     CN VII   Facial expression full, symmetric.     CN VIII   Hearing: intact    CN XI   CN XI normal.     CN XII   CN XII normal.     MOTOR EXAM   Muscle bulk:  normal  Overall muscle tone: normal    SENSORY EXAM   Light touch normal.   Proprioception normal.     GAIT AND COORDINATION      Coordination   Finger to nose coordination: normal      Significant Labs: All pertinent lab results from the past 24 hours have been reviewed.    Significant Studies: I have reviewed all pertinent imaging results/findings within the past 24 hours.

## 2023-10-11 NOTE — ASSESSMENT & PLAN NOTE
Ct head negative for acute process.  Home AED regimen: Keppra 1500 TID and zonisamide 300mg HS  - Neurology consulted ; appreciate recs  - f/u EEG   - continue home zonisamide per neuro  - stop home keppra per neuro  - f/u Keppra and Zonisamide serum levels; pts states he is compliant with meds  - seizure precuations  - ativan prn seizure  - transfered to EMU bed 909; transfer primary service to Epilepsy with Dr Chase Núñez tomorrow 0700

## 2023-10-12 ENCOUNTER — SOCIAL WORK (OUTPATIENT)
Dept: NEUROLOGY | Facility: CLINIC | Age: 53
End: 2023-10-12
Payer: MEDICAID

## 2023-10-12 VITALS
HEART RATE: 65 BPM | SYSTOLIC BLOOD PRESSURE: 162 MMHG | BODY MASS INDEX: 22.03 KG/M2 | OXYGEN SATURATION: 99 % | TEMPERATURE: 99 F | RESPIRATION RATE: 16 BRPM | DIASTOLIC BLOOD PRESSURE: 89 MMHG | HEIGHT: 72 IN | WEIGHT: 162.69 LBS

## 2023-10-12 LAB
ALBUMIN SERPL BCP-MCNC: 3.8 G/DL (ref 3.5–5.2)
ALP SERPL-CCNC: 71 U/L (ref 55–135)
ALT SERPL W/O P-5'-P-CCNC: 7 U/L (ref 10–44)
ANION GAP SERPL CALC-SCNC: 8 MMOL/L (ref 8–16)
AST SERPL-CCNC: 13 U/L (ref 10–40)
BASOPHILS # BLD AUTO: 0.02 K/UL (ref 0–0.2)
BASOPHILS NFR BLD: 0.4 % (ref 0–1.9)
BILIRUB SERPL-MCNC: 0.5 MG/DL (ref 0.1–1)
BUN SERPL-MCNC: 15 MG/DL (ref 6–20)
CALCIUM SERPL-MCNC: 9.5 MG/DL (ref 8.7–10.5)
CHLORIDE SERPL-SCNC: 107 MMOL/L (ref 95–110)
CO2 SERPL-SCNC: 23 MMOL/L (ref 23–29)
CREAT SERPL-MCNC: 1.3 MG/DL (ref 0.5–1.4)
DIFFERENTIAL METHOD: ABNORMAL
EOSINOPHIL # BLD AUTO: 0.1 K/UL (ref 0–0.5)
EOSINOPHIL NFR BLD: 2.4 % (ref 0–8)
ERYTHROCYTE [DISTWIDTH] IN BLOOD BY AUTOMATED COUNT: 12.9 % (ref 11.5–14.5)
EST. GFR  (NO RACE VARIABLE): >60 ML/MIN/1.73 M^2
GLUCOSE SERPL-MCNC: 83 MG/DL (ref 70–110)
HCT VFR BLD AUTO: 45 % (ref 40–54)
HGB BLD-MCNC: 14.6 G/DL (ref 14–18)
IMM GRANULOCYTES # BLD AUTO: 0.01 K/UL (ref 0–0.04)
IMM GRANULOCYTES NFR BLD AUTO: 0.2 % (ref 0–0.5)
LYMPHOCYTES # BLD AUTO: 2.7 K/UL (ref 1–4.8)
LYMPHOCYTES NFR BLD: 59 % (ref 18–48)
MAGNESIUM SERPL-MCNC: 2 MG/DL (ref 1.6–2.6)
MCH RBC QN AUTO: 31.7 PG (ref 27–31)
MCHC RBC AUTO-ENTMCNC: 32.4 G/DL (ref 32–36)
MCV RBC AUTO: 98 FL (ref 82–98)
MONOCYTES # BLD AUTO: 0.6 K/UL (ref 0.3–1)
MONOCYTES NFR BLD: 12.7 % (ref 4–15)
NEUTROPHILS # BLD AUTO: 1.2 K/UL (ref 1.8–7.7)
NEUTROPHILS NFR BLD: 25.3 % (ref 38–73)
NRBC BLD-RTO: 0 /100 WBC
PHOSPHATE SERPL-MCNC: 4.1 MG/DL (ref 2.7–4.5)
PLATELET # BLD AUTO: 224 K/UL (ref 150–450)
PMV BLD AUTO: 9.6 FL (ref 9.2–12.9)
POTASSIUM SERPL-SCNC: 4.4 MMOL/L (ref 3.5–5.1)
PROT SERPL-MCNC: 6.9 G/DL (ref 6–8.4)
RBC # BLD AUTO: 4.6 M/UL (ref 4.6–6.2)
SODIUM SERPL-SCNC: 138 MMOL/L (ref 136–145)
WBC # BLD AUTO: 4.63 K/UL (ref 3.9–12.7)

## 2023-10-12 PROCEDURE — 63600175 PHARM REV CODE 636 W HCPCS

## 2023-10-12 PROCEDURE — 36415 COLL VENOUS BLD VENIPUNCTURE: CPT | Performed by: FAMILY MEDICINE

## 2023-10-12 PROCEDURE — 63600175 PHARM REV CODE 636 W HCPCS: Performed by: FAMILY MEDICINE

## 2023-10-12 PROCEDURE — 85025 COMPLETE CBC W/AUTO DIFF WBC: CPT | Performed by: FAMILY MEDICINE

## 2023-10-12 PROCEDURE — 99239 HOSP IP/OBS DSCHRG MGMT >30: CPT | Mod: ,,, | Performed by: PHYSICIAN ASSISTANT

## 2023-10-12 PROCEDURE — 99499 UNLISTED E&M SERVICE: CPT | Mod: ,,, | Performed by: PSYCHIATRY & NEUROLOGY

## 2023-10-12 PROCEDURE — 25000003 PHARM REV CODE 250: Performed by: HOSPITALIST

## 2023-10-12 PROCEDURE — C9254 INJECTION, LACOSAMIDE: HCPCS

## 2023-10-12 PROCEDURE — 96367 TX/PROPH/DG ADDL SEQ IV INF: CPT

## 2023-10-12 PROCEDURE — 80053 COMPREHEN METABOLIC PANEL: CPT | Performed by: FAMILY MEDICINE

## 2023-10-12 PROCEDURE — 83735 ASSAY OF MAGNESIUM: CPT | Performed by: FAMILY MEDICINE

## 2023-10-12 PROCEDURE — 96376 TX/PRO/DX INJ SAME DRUG ADON: CPT

## 2023-10-12 PROCEDURE — 99239 PR HOSPITAL DISCHARGE DAY,>30 MIN: ICD-10-PCS | Mod: ,,, | Performed by: PHYSICIAN ASSISTANT

## 2023-10-12 PROCEDURE — 25000003 PHARM REV CODE 250: Performed by: FAMILY MEDICINE

## 2023-10-12 PROCEDURE — 99499 NO LOS: ICD-10-PCS | Mod: ,,, | Performed by: PSYCHIATRY & NEUROLOGY

## 2023-10-12 PROCEDURE — 25000003 PHARM REV CODE 250

## 2023-10-12 PROCEDURE — 84100 ASSAY OF PHOSPHORUS: CPT | Performed by: FAMILY MEDICINE

## 2023-10-12 PROCEDURE — G0378 HOSPITAL OBSERVATION PER HR: HCPCS

## 2023-10-12 RX ORDER — MELOXICAM 7.5 MG/1
7.5 TABLET ORAL 2 TIMES DAILY
Qty: 14 TABLET | Refills: 0 | Status: SHIPPED | OUTPATIENT
Start: 2023-10-12 | End: 2023-10-13 | Stop reason: SDUPTHER

## 2023-10-12 RX ORDER — LACOSAMIDE 150 MG/1
150 TABLET ORAL EVERY 12 HOURS
Qty: 60 TABLET | Refills: 2 | Status: SHIPPED | OUTPATIENT
Start: 2023-10-12 | End: 2023-10-13 | Stop reason: SDUPTHER

## 2023-10-12 RX ADMIN — ACETAMINOPHEN 1000 MG: 500 TABLET ORAL at 08:10

## 2023-10-12 RX ADMIN — VERAPAMIL HYDROCHLORIDE 80 MG: 40 TABLET ORAL at 02:10

## 2023-10-12 RX ADMIN — MELOXICAM 7.5 MG: 7.5 TABLET ORAL at 08:10

## 2023-10-12 RX ADMIN — LACOSAMIDE 400 MG: 10 INJECTION INTRAVENOUS at 12:10

## 2023-10-12 RX ADMIN — VERAPAMIL HYDROCHLORIDE 80 MG: 40 TABLET ORAL at 08:10

## 2023-10-12 RX ADMIN — KETOROLAC TROMETHAMINE 15 MG: 30 INJECTION, SOLUTION INTRAMUSCULAR; INTRAVENOUS at 12:10

## 2023-10-12 NOTE — DISCHARGE SUMMARY
"[facility]  Neurology-Epilepsy  Discharge Summary      Patient Name: Irvin Meza  MRN: [id number]  Admission Date: 10/10/2023  Hospital Length of Stay: 0 days  Discharge Date and Time:  10/12/2023 3:29 PM  Attending Physician: Chase Núñez MD   Discharging Provider: Sandhya Olmstead PA-C  Primary Care Physician: Nayely, Primary Doctor    HPI:   Mr. Meza is a 53 year old man with intractable epilepsy since infancy who presented to ER from Neurology clinic for evaluation and management of frequent breakthrough seizures. Patient is currently maintained on Levetiracetam 1500 mg TID, Zonisamide 300 mg qHS and continues to have generalized convulsions multiple times per week, in addition to events of "blacking out" and wandering off daily. He additionally reports intermittent myoclonic jerks that at times precede his seizures. He endorses triggers of overheating and watching too much tv. He reports that his mood has been poor for many years, and that he is quick to anger in addition to feeling depressed with passive suicidal ideation with no plan. Patient additionally reports daily headaches, worse after his seizures, for which he takes Verapamil 80 mg TID. Patient reports no family history of seizures. Prior EEG report at [facility]SH with 3-4 Hz generalized discharges. CTH 10/10 with no acute intracranial findings. Admitted for event capture, characterization, and medication optimization.    HPI 10/7/2021 clinic visit with Maria R Blankenship PA-C:      Age of first seizure: born with seizures, seizures as an infant   Seizure Risk Factors: no family history of seizures, term, unclear about details of birth but does not think he stayed in the hospital long, no CNS infections, several head strikes with seizures only   Time of Last Seizure: week ago   # of lifetime Seizures: 1000+  Frequency of Seizures:  At most 2 in a day, 1-2 seizures per month  Seizure Triggers:  Overheating, strong smells like perfume and gasoline, lights and " "too much TV, etoh  Injuries/Hospitalization for seizures? Head strike, tongue bite, fractured arms, lost teeth (had to get teeth pulled)  Bone Health: Dexa at [facility] but does not know results      Auras:  Headache and "weird feeling" -> space out, will start stuttering      Seizure Events:   1. Generalized convusions, Banging head back, eye roll back, does not want anyone to touch him, will throw people around, urinary incontinence, tired afterwards, couple days to get back to normal, sore afterwards with a persistent headache   2. Black out, wander off, walk across the stress, act funny, can be combative, does not want to be touched, very tired     Current AED/SEs:  1. Levetiracetam 1000 mg twice daily SE still having seizures but frequently misses doses     Previous AED/SEs or reason for DC.   1. Phenytoin SE dental issues      EEG: done at [facility] and [facility] -> 3-4 Hz discharges per care everywhere tab     CT 2011: normal      Other Allergies: none      AED compliance, adherence: misses some doses          * No surgery found *     Indwelling Lines/Drains at time of discharge:   Lines/Drains/Airways     None               Hospital Course:   53 year old man with intractable generalized epilepsy since childhood with frequent generalized convulsions and dyscognitive episodes with bizarre behaviors admitted for seizure capture, characterization, and medication optimization. Currently maintained on Levetiracetam 1500 mg TID and Zonisamide 300 mg nightly with continued uncontrolled seizures and reports of significant mood effects of anger and depression.  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 Loaded with Lacosamide 400 mg x1 10/12, patient to continue Lacosamide 150 mg BID maintenance dose in addition to " Zonisamide 300 mg nightly. Discontinue Levetiracetam given mood side effects and continued seizures when taking. Outpatient follow up in Neurology Headache clinic for headache management, follow up as scheduled in Neurology Epilepsy clinic for further management. Discharged home in stable condition 10/12.       Goals of Care Treatment Preferences:  Code Status: Full Code      Consults:   Consults (From admission, onward)        Status Ordering Provider     Inpatient consult to neurology  Once        Provider:  (Not yet assigned)    Completed PAMELA CLIFTON          Significant Labs: All pertinent lab results from the past 24 hours have been reviewed.    Significant Studies: I have reviewed all pertinent imaging results/findings within the past 24 hours.    Pending Diagnostic Studies:     Procedure Component Value Units Date/Time    Levetiracetam level [[id number]] Collected: 10/10/23 1749    Order Status: Sent Lab Status: In process Updated: 10/10/23 1822    Specimen: Blood         Final Active Diagnoses:    Diagnosis Date Noted POA    PRINCIPAL PROBLEM:  Generalized epilepsy, intractable [G40.319] 10/07/2021 Yes    Headache [R51.9] 10/10/2023 Yes      Problems Resolved During this Admission:       Neuro  * Generalized epilepsy, intractable  53 year old man with intractable generalized epilepsy since childhood with frequent generalized convulsions and dyscognitive episodes with bizarre behaviors admitted for seizure capture, characterization, and medication optimization. Currently maintained on Levetiracetam 1500 mg TID and Zonisamide 300 mg nightly with continued uncontrolled seizures.     - vEEG during admission with 3-4 hertz generalized spike-wave discharges along with generalized polyspike and wave discharges suggestive of an idiopathic generalized epilepsy syndrome  - Discontinued Levetiracetam given continued seizures and possible mood side effects   - Loaded with IV Lacosamide 400 mg x1, will continue PO  Lacosamide 150 mg BID maintenance dose  - Continue Zonisamide 300 mg nightly  - AED levels drawn on admission, pending   - Activation procedures completed per protocol during admission  - IV Ativan PRN for GTC greater than 5 min - please call epilepsy on call   - Seizure precautions, suction and oxygen at bedside  - Outpatient follow up in Neurology Epilepsy clinic in 2 months as previously scheduled    Headache  - Hx of cluster headaches, worse around his seizures  - Continue Meloxicam 7.5 mg BID x1 week  - Continue home Verapamil 80 mg TID  - Outpatient follow up in headache clinic for management        Discharged Condition: stable    Disposition: Home or Self Care    Follow Up:   Follow-up Information     Maria R Blankenship PA-C Follow up.    Specialty: Neurology  Why: Please follow up as scheduled with Maria R Blankenship PA-C  Contact information:  [facility] [address]  [phone]                       Patient Instructions:      Diet Adult Regular     Notify your health care provider if you experience any of the following:  difficulty breathing or increased cough     Notify your health care provider if you experience any of the following:  severe persistent headache     Notify your health care provider if you experience any of the following:  worsening rash     Notify your health care provider if you experience any of the following:  persistent dizziness, light-headedness, or visual disturbances     Notify your health care provider if you experience any of the following:  increased confusion or weakness     Activity as tolerated   Order Comments: No driving, bathing alone, swimming, or any event during which sudden loss of awareness could harm patient or others until 6 months event free, or cleared by Neurology       Medications:  Reconciled Home Medications:      Medication List      START taking these medications    lacosamide 150 mg Tab tablet  Commonly known as: VIMPAT  Take 1 tablet (150  mg total) by mouth every 12 (twelve) hours.     meloxicam 7.5 MG tablet  Commonly known as: MOBIC  Take 1 tablet (7.5 mg total) by mouth 2 (two) times a day. for 7 days        CONTINUE taking these medications    NURTEC 75 mg odt  Generic drug: rimegepant  Take 75 mg by mouth once as needed for Migraine. Place ODT tablet on the tongue; alternatively the ODT tablet may be placed under the tongue     verapamiL 80 MG tablet  Commonly known as: CALAN  Take 80 mg by mouth 3 (three) times daily.     zonisamide 100 MG Cap  Commonly known as: ZONEGRAN  Take 2 capsules (200 mg total) by mouth every evening.        STOP taking these medications    levETIRAcetam 500 MG Tab  Commonly known as: KEPPRA          Time spent on the discharge of patient: 60 minutes    Sandhya Olmstead PA-C  Neurology-Epilepsy  [facility]  Staff: Dr. Núñez

## 2023-10-12 NOTE — ASSESSMENT & PLAN NOTE
- Hx of cluster headaches, worse around his seizures  - Continue Meloxicam 7.5 mg BID x1 week  - Continue home Verapamil 80 mg TID  - Outpatient follow up in headache clinic for management

## 2023-10-12 NOTE — ASSESSMENT & PLAN NOTE
53 year old man with intractable generalized epilepsy since childhood with frequent generalized convulsions and dyscognitive episodes with bizarre behaviors admitted for seizure capture, characterization, and medication optimization. Currently maintained on Levetiracetam 1500 mg TID and Zonisamide 300 mg nightly with continued uncontrolled seizures.     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

## 2023-10-12 NOTE — PLAN OF CARE
Problem: Adult Inpatient Plan of Care  Goal: Plan of Care Review  Outcome: Ongoing, Progressing  Flowsheets (Taken 10/12/2023 0600)  Plan of Care Reviewed With: patient  Goal: Optimal Comfort and Wellbeing  Outcome: Ongoing, Progressing     Problem: Seizure, Active Management  Goal: Absence of Seizure/Seizure-Related Injury  Outcome: Ongoing, Progressing  Intervention: Prevent Seizure-Related Injury  Flowsheets (Taken 10/12/2023 0600)  Seizure Precautions:   activity supervised   clutter-free environment maintained   emergency equipment at bedside   side rails padded   soft boundaries provided           POC reviewed and updated with the patient. Questions regarding POC were encouraged and addressed with the patient.  VSS, see flow-sheets. Tele maintained per order.  VISI  maintained for additional monitoring. Patient is AO X 4 at this time. Continuous EEG in place. Fall/safety precautions maintained, no signs of injury noted during shift. Seizure precautions maintained. Patient was repositioned for comfort, bed locked in low position with side rails X 4, bed alarm refused, with call light within reach. Instructed patient to call staff for mobility, verbalized understanding.  No acute signs of distress noted at this time.                 PATIENT MEDICALLY STABLE: Yes    IVFs OR IV MEDICATIONS:  N/A    3. PAIN REGIMEN: Therapies; meds pain: Tylenol #3, 1 or 2 q8h prn, Meds; pain: morphine intravenous    4. O2: room air       5. CRAIG: No   Reason for craig: N/A    6. CENTRAL LINE No      7. 4 EYES AND SKIN ASSESSMENT: Yes  DATE COMPLETED: 10/10/23  WOUNDS: No   WOUND CARE CONSULTED/ORDERS: No    8. UP TO CHAIR: Yes  LAST TIME: 10/11/23    9. PT/OT ORDERS: No    10. ADL EQUIPMENT: No       11.  LBM: 10/11/23  BOWEL REGIMEN: Bowel care: medications    12. PENDING TESTS/LABS: No  Test neuro: continuous vEEG    13. BARRIERS TO DISCHARGE: No  IF YES, WHAT: N/A

## 2023-10-12 NOTE — PLAN OF CARE
Problem: Adult Inpatient Plan of Care  Goal: Plan of Care Review  Outcome: Met  Goal: Patient-Specific Goal (Individualized)  Outcome: Met  Goal: Absence of Hospital-Acquired Illness or Injury  Outcome: Met  Goal: Optimal Comfort and Wellbeing  Outcome: Met  Goal: Readiness for Transition of Care  Outcome: Met     Problem: Seizure, Active Management  Goal: Absence of Seizure/Seizure-Related Injury  Outcome: Met     Patient has met all goals and is ready for discharge home. Patients vitals have been stable. He was given new AED today. Patient was advised to follow up with headache clinic for headaches.

## 2023-10-12 NOTE — PROGRESS NOTES
Epilepsy  met with patient briefly in their room in the EMU.   Patient currently resting and currently alone in his room.     SW introduced himself as a therapist and  that supports patient's with epilepsy and neurological symptoms at the hospital.   ALEJO reported he works closely with the epileptologists and neurologists.     ALEJO provided comfort bag provided by [facility] [place].   HOME ([email or web address])    ALEJO provided information flyer regarding [facility] [place] [facility] November 4, 2023.   [email or web address].[email or web address]/      ALEJO also provided business card with his information.     he reports his main concern now is headaches but the current medication they gave him on the unit has helped.         Yosvany Cast [facility]    Clinical  -ALS, Epilepsy, Headache  [facility].  [email or web address]  Phone# [phone]  Fax # [phone]

## 2023-10-12 NOTE — PROCEDURES
EMU Report    DATE OF PROCEDURE:  10/12/23     EEG NUMBER:  [id number]     REFERRING PHYSICIAN: Dr. Hui      This EEG was performed to characterize the patient's events.     ELECTROENCEPHALOGRAM REPORT     METHODOLOGY:  Electroencephalographic (EEG) recording is recorded with electrodes placed according to the International 10-20 placement system.  Thirty two (32) channels of digital signal (sampling rate of 512/sec), including T1 and T2, were simultaneously recorded from the scalp and may include EKG, EMG, and/or eye monitors.  Recording band pass was 0.1 to 512 Hz.  Digital video recording of the patient is simultaneously recorded with the EEG.  The patient is instructed to report clinical symptoms which may occur during the recording session.  EEG and video recording are stored and archived in digital format.    Activation procedures, which include photic stimulation, hyperventilation and instructing patients to perform simple tasks, are done in selected patients.   The EEG is displayed on a monitor screen and can be reviewed using different montages.  Computer assisted-analysis is employed to detect spike and electrographic seizure activity.   The entire record is submitted for computer analysis.  The entire recording is visually reviewed, and the times identified by computer analysis as being spikes or seizures are reviewed again.    Compressed spectral analysis (CSA) is also performed on the activity recorded from each individual channel.  This is displayed as a power display of frequencies from 0 to 30 Hz over time.   The CSA is reviewed looking for asymmetries in power between homologous areas of the scalp, then compared with the original EEG recording.    [employer] software was also utilized in the review of this study.  This software suite analyzes the EEG recording in multiple domains.  Coherence and rhythmicity are computed to identify EEG sections which may contain organized seizures.  Each channel  undergoes analysis to detect the presence of spike and sharp waves which have special and morphological characteristics of epileptic activity.  The routine EEG recording is converted from special into frequency domain.  This is then displayed comparing homologous areas to identify areas of significant asymmetry.  Algorithm to identify non-cortically generated artifact is used to separate artifact from the EEG.       RECORDING TIMES:  Start on October 11, 2022 at hour 11 minute 27 seconds 8   End on October 11, 2023 at hour 14 minute 3 seconds 44   Start on October 11, 2023 at hour 15 minute 46 seconds 0   End on October 12, 2023 at hours 6 minute 59 seconds 59   The total time of EEG recording for the study was 19 hrs     SEIZURES RECORDED:  During this recording no events were recorded     ELECTROENCEPHALOGRAM:  Interictal:  The recording was obtained with a number of standard bipolar and referential montages during wakefulness, drowsiness and sleep.  In the alert state, the posterior background rhythm was a symmetric, well-modulated 10 Hz alpha rhythm, which reacted symmetrically to eye opening.  Activation procedures were not performed.  During drowsiness, the background rhythm waxed and waned and there were periods of slowing. During stage II sleep, symmetric V waves, K complexes and sleep spindles were noted.   Bursts of 3-4 hertz generalized spike and wave discharges as well as single isolated spike and polyspike wave discharges were noted.  The maximum duration was up to 2 seconds.    Ictal:  No events recorded      EKG: The EKG channeled revealed normal sinus rhythm     FINAL SUMMARY:  ELECTROENCEPHALOGRAM:  Interictal:  This is an abnormal EEG during wakefulness, drowsiness and sleep.  Bursts of 3-4 hertz spike-wave discharges were noted     Ictal:  During this recording no events were recorded      CLINICAL SEIZURE:  Classification:  Idiopathic generalized epilepsy     CLINICAL CORRELATION:  The patient is  a 53-year-old male with a history of epilepsy who is currently maintained on zonisamide.  This is an abnormal EEG during wakefulness, drowsiness and sleep.  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  During the study no seizures recorded.   immune

## 2023-10-12 NOTE — HOSPITAL COURSE
53 year old man with intractable generalized epilepsy since childhood with frequent generalized convulsions and dyscognitive episodes with bizarre behaviors admitted for seizure capture, characterization, and medication optimization. Currently maintained on Levetiracetam 1500 mg TID and Zonisamide 300 mg nightly with continued uncontrolled seizures and reports of significant mood effects of anger and depression.  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 Loaded with Lacosamide 400 mg x1 10/12, patient to continue Lacosamide 150 mg BID maintenance dose in addition to Zonisamide 300 mg nightly. Discontinue Levetiracetam given mood side effects and continued seizures when taking. Outpatient follow up in Neurology Headache clinic for headache management, follow up as scheduled in Neurology Epilepsy clinic for further management. Discharged home in stable condition 10/12.

## 2023-10-13 ENCOUNTER — TELEPHONE (OUTPATIENT)
Dept: NEUROLOGY | Facility: CLINIC | Age: 53
End: 2023-10-13
Payer: MEDICAID

## 2023-10-13 DIAGNOSIS — G89.29 CHRONIC NONINTRACTABLE HEADACHE, UNSPECIFIED HEADACHE TYPE: Primary | ICD-10-CM

## 2023-10-13 DIAGNOSIS — G40.319 GENERALIZED EPILEPSY, INTRACTABLE: Primary | ICD-10-CM

## 2023-10-13 DIAGNOSIS — R51.9 CHRONIC NONINTRACTABLE HEADACHE, UNSPECIFIED HEADACHE TYPE: Primary | ICD-10-CM

## 2023-10-13 LAB — LEVETIRACETAM SERPL-MCNC: 13.8 UG/ML (ref 3–60)

## 2023-10-13 RX ORDER — LACOSAMIDE 150 MG/1
150 TABLET ORAL EVERY 12 HOURS
Qty: 60 TABLET | Refills: 5 | Status: SHIPPED | OUTPATIENT
Start: 2023-10-13 | End: 2023-12-11

## 2023-10-13 RX ORDER — MELOXICAM 7.5 MG/1
7.5 TABLET ORAL 2 TIMES DAILY
Qty: 14 TABLET | Refills: 0 | Status: SHIPPED | OUTPATIENT
Start: 2023-10-13 | End: 2023-10-20

## 2023-10-13 NOTE — TELEPHONE ENCOUNTER
Lost his prescription for meloxicam.  Meloxicam 7.5 mg twice daily x7 days.    Becky Hui MD PhD FACNS  Neurology-Epilepsy  [facility].      ----- Message from Maria R Blankenship PA-C sent at 10/13/2023 12:27 PM CDT -----  Are you ok with sending this in? I've never prescribed it.     Thanks!    Jesica  ----- Message -----  From: Shereen Dia  Sent: 10/13/2023  12:19 PM CDT  To: Krzysztof Heck Staff    NANI REECE calling regarding Patient Advice (message) [facility] calling requesting a new Rx for meloxicam (MOBIC) 7.5 MG tablet they states the pt lose meds and has been approved for lose of meds but do not have any refill on old Rx     [facility] [address]  [address]   [place] [address]  Phone: [phone] Fax: [phone]

## 2023-10-13 NOTE — TELEPHONE ENCOUNTER
Lacosamide 150mg BID sent to [facility].     Maria R Blankenship PA-C   Neurology-Epilepsy  [facility]    ----- Message from Lili Anderson sent at 10/13/2023 11:00 AM CDT -----  Regarding: refill  Contact: [phone]  Pt asking for a call back in regards to pt being released. Pt stated his medicine was lost at facility. Pls call to discuss. PT DOES NOT KNOW WHAT TO DO.   PT IS OUT OF MEDICATION   lacosamide (VIMPAT) 150 mg Tab tablet and meloxicam (MOBIC) 7.5 MG tablet      [facility]  [address]  [address]   [place] [address]  Phone: [phone] Fax: [phone]

## 2023-10-13 NOTE — TELEPHONE ENCOUNTER
----- Message from Inna George PA-C sent at 10/11/2023  2:42 PM CDT -----  Yep, can see me  ----- Message -----  From: Franki Mackey MA  Sent: 10/11/2023   8:15 AM CDT  To: Inna George PA-C    Appropriate to see?  ----- Message -----  From: Maria R Blankenship PA-C  Sent: 10/10/2023   5:51 PM CDT  To: GRACIELA Moreland,     Can we get this patient scheduled with a headache specialist for evaluation of what seem like cluster headaches? Thank you!! Referral is in.     Jesica

## 2023-10-16 ENCOUNTER — TELEPHONE (OUTPATIENT)
Dept: NEUROLOGY | Facility: CLINIC | Age: 53
End: 2023-10-16
Payer: MEDICAID

## 2023-10-16 NOTE — TELEPHONE ENCOUNTER
----- Message from Becky Hui MD PhD sent at 10/13/2023  9:23 PM CDT -----  Contact: rory  It looks like the appointment he is calling about is actually with the headache team (Inna George).  Could you give him a call?    Thank you,  Becky  ----- Message -----  From: Denis Bergeron  Sent: 10/13/2023   4:55 PM CDT  To: Korina Pena Staff; Krzysztof Heck Staff    Type:  Patient Returning Call    Who Called:patient   Who Left Message for Patient:sharona welch  Does the patient know what this is regarding?:appt   Would the patient rather a call back or a response via [facility]? Call back   Best Call Back Number:[phone]  Additional Information: pt would like to speak with someone in regards of an appt on 11/01/2023 at 1pm please assist

## 2023-10-31 ENCOUNTER — OFFICE VISIT (OUTPATIENT)
Dept: NEUROLOGY | Facility: CLINIC | Age: 53
End: 2023-10-31
Payer: MEDICAID

## 2023-10-31 VITALS
BODY MASS INDEX: 22.08 KG/M2 | HEART RATE: 94 BPM | SYSTOLIC BLOOD PRESSURE: 152 MMHG | HEIGHT: 72 IN | WEIGHT: 163 LBS | DIASTOLIC BLOOD PRESSURE: 100 MMHG

## 2023-10-31 DIAGNOSIS — G43.709 CHRONIC MIGRAINE WITHOUT AURA WITHOUT STATUS MIGRAINOSUS, NOT INTRACTABLE: Primary | ICD-10-CM

## 2023-10-31 DIAGNOSIS — R20.2 PARESTHESIAS: ICD-10-CM

## 2023-10-31 DIAGNOSIS — R29.898 DECREASED ROM OF NECK: ICD-10-CM

## 2023-10-31 DIAGNOSIS — R53.1 WEAKNESS: ICD-10-CM

## 2023-10-31 DIAGNOSIS — R03.0 ELEVATED BP WITHOUT DIAGNOSIS OF HYPERTENSION: ICD-10-CM

## 2023-10-31 DIAGNOSIS — R06.83 SNORING: ICD-10-CM

## 2023-10-31 DIAGNOSIS — F43.9 STRESS: ICD-10-CM

## 2023-10-31 DIAGNOSIS — R41.3 MEMORY CHANGE: ICD-10-CM

## 2023-10-31 DIAGNOSIS — Z72.0 TOBACCO USE: ICD-10-CM

## 2023-10-31 DIAGNOSIS — H53.9 VISUAL CHANGES: ICD-10-CM

## 2023-10-31 DIAGNOSIS — R51.9 NONINTRACTABLE HEADACHE, UNSPECIFIED CHRONICITY PATTERN, UNSPECIFIED HEADACHE TYPE: ICD-10-CM

## 2023-10-31 PROCEDURE — 3008F PR BODY MASS INDEX (BMI) DOCUMENTED: ICD-10-PCS | Mod: CPTII,,, | Performed by: PHYSICIAN ASSISTANT

## 2023-10-31 PROCEDURE — 99999 PR PBB SHADOW E&M-EST. PATIENT-LVL V: ICD-10-PCS | Mod: PBBFAC,,, | Performed by: PHYSICIAN ASSISTANT

## 2023-10-31 PROCEDURE — 99999 PR PBB SHADOW E&M-EST. PATIENT-LVL V: CPT | Mod: PBBFAC,,, | Performed by: PHYSICIAN ASSISTANT

## 2023-10-31 PROCEDURE — 99417 PR PROLONGED SVC, OUTPT, W/WO DIRECT PT CONTACT,  EA ADDTL 15 MIN: ICD-10-PCS | Mod: S$PBB,,, | Performed by: PHYSICIAN ASSISTANT

## 2023-10-31 PROCEDURE — 3077F PR MOST RECENT SYSTOLIC BLOOD PRESSURE >= 140 MM HG: ICD-10-PCS | Mod: CPTII,,, | Performed by: PHYSICIAN ASSISTANT

## 2023-10-31 PROCEDURE — 1159F MED LIST DOCD IN RCRD: CPT | Mod: CPTII,,, | Performed by: PHYSICIAN ASSISTANT

## 2023-10-31 PROCEDURE — 99215 PR OFFICE/OUTPT VISIT, EST, LEVL V, 40-54 MIN: ICD-10-PCS | Mod: S$PBB,,, | Performed by: PHYSICIAN ASSISTANT

## 2023-10-31 PROCEDURE — 3080F PR MOST RECENT DIASTOLIC BLOOD PRESSURE >= 90 MM HG: ICD-10-PCS | Mod: CPTII,,, | Performed by: PHYSICIAN ASSISTANT

## 2023-10-31 PROCEDURE — 99215 OFFICE O/P EST HI 40 MIN: CPT | Mod: PBBFAC | Performed by: PHYSICIAN ASSISTANT

## 2023-10-31 PROCEDURE — 3077F SYST BP >= 140 MM HG: CPT | Mod: CPTII,,, | Performed by: PHYSICIAN ASSISTANT

## 2023-10-31 PROCEDURE — 99215 OFFICE O/P EST HI 40 MIN: CPT | Mod: S$PBB,,, | Performed by: PHYSICIAN ASSISTANT

## 2023-10-31 PROCEDURE — 99417 PROLNG OP E/M EACH 15 MIN: CPT | Mod: S$PBB,,, | Performed by: PHYSICIAN ASSISTANT

## 2023-10-31 PROCEDURE — 1159F PR MEDICATION LIST DOCUMENTED IN MEDICAL RECORD: ICD-10-PCS | Mod: CPTII,,, | Performed by: PHYSICIAN ASSISTANT

## 2023-10-31 PROCEDURE — 1160F PR REVIEW ALL MEDS BY PRESCRIBER/CLIN PHARMACIST DOCUMENTED: ICD-10-PCS | Mod: CPTII,,, | Performed by: PHYSICIAN ASSISTANT

## 2023-10-31 PROCEDURE — 3008F BODY MASS INDEX DOCD: CPT | Mod: CPTII,,, | Performed by: PHYSICIAN ASSISTANT

## 2023-10-31 PROCEDURE — 3080F DIAST BP >= 90 MM HG: CPT | Mod: CPTII,,, | Performed by: PHYSICIAN ASSISTANT

## 2023-10-31 PROCEDURE — 1160F RVW MEDS BY RX/DR IN RCRD: CPT | Mod: CPTII,,, | Performed by: PHYSICIAN ASSISTANT

## 2023-10-31 RX ORDER — VERAPAMIL HYDROCHLORIDE 120 MG/1
120 CAPSULE, EXTENDED RELEASE ORAL DAILY
Qty: 30 CAPSULE | Refills: 5 | Status: SHIPPED | OUTPATIENT
Start: 2023-10-31 | End: 2024-02-20

## 2023-10-31 NOTE — PATIENT INSTRUCTIONS
Plan:   - begin tracking headaches with diary given today  - start taking Verapamil 120mg tablet daily. If you are already taking, please message/call me.   - if you have a headache and wish to treat that day, please try nurtec which I have placed into your pharmacy today. Do not take any over the counters, please stop taking Mobic and Rizatriptan.   - I have placed referrals to neurology, ophthalmology, sleep clinic, psychiatry, and physical therapy for the other concerns you discussed with me today.   - monitor your BP and inform your PCP if they remain elevated, go to ED if BP worsens  - see me back in 2-3 months or sooner if needed  - go to an ED or UC if symptoms worsen, you develop new symptoms, or need urgent/emergent addressing  - bring your medications to appointments

## 2023-10-31 NOTE — PROGRESS NOTES
See plan of care for physical therapy evaluation     Bria Harris, PT, DPT,   Board-Certified Clinical Specialist in Neurologic Physical Therapy   Certified Brain Injury Specialist

## 2023-10-31 NOTE — LETTER
October 31, 2023      [facility]  [address]  [facility] [address]  Phone: [phone]  Fax: [phone]       Patient: Irvin Meza   YOB: 1970  Date of Visit: 10/31/2023    To Whom It May Concern:    Óscar Meza  was at [facility] on 10/31/2023. The patient may return to school on 11/01/23 with no restrictions. If you have any questions or concerns, or if I can be of further assistance, please do not hesitate to contact me.    Sincerely,    Aldo Mullins III, MD

## 2023-10-31 NOTE — Clinical Note
Irvin Baron wanted me to ask you if its ok if in a year he goes to live with his daughter in [place]. He was mainly concerned with traveling on an airplane w/ epilepsy.  Mind messaging/calling him back and discussing with him? Or just discussing at next f/up.  Thanks, Inna

## 2023-10-31 NOTE — PROGRESS NOTES
"New Patient     SUBJECTIVE:  Patient ID: Irvin Meza   MRN: [id number]  Referred By: No ref. provider found  Chief Complaint: Headache      History of Present Illness:   53 y.o. male with ha's, epilepsy, paroxysmal SVT, elevated BP, who presents to clinic alone for evaluation of headaches.   PMHx negative for TBI, Meningitis, Aneurysms, Kidney Stones, asthma, GI bleed, osteoporosis, CAD/MI, CVA/TIA, DM, cancer    Pt has a h/o ha's since his teens. They occur 1-3 times a week on average. Denies periods of remission. Over the last 2 months, they have worsened and are now occurring daily. Current ha's are "the same as" his previous ha's. Denies recent changes. Pt was recently admitted to [facility] for his epilepsy, reported ha's at the time as he feels they worsen around times of worsened epilepsy. He verbalized improvement temporarily while there. Was given mobic, IVF + mag, toradol inj, verapamil (which he reported taking at home, but today reports he was not taking), and switched from keppra to vimpat. Ha's are still daily. He has trouble describing many characteristics, meds tried, and history due to memory issues. But able to describe ha's as follows. Ha's are throbbing in nature. They can occur in various locations including mid frontalis, unilateral occipitalis radiating forward, behind left eye, or left temple. They last hours at a time. They are mild to severe in severity. They can improve both with pacing or resting/sitting still. Showers, dark spaces, and otc's also help. Triggers include staring at the tv too long, bright lights, fluorescent lights, perfume, and other certain smells. Pt has trouble describing associated symptoms as he is experiencing various other complaints. He endorses visual blurriness to the point where "I can't see anything but shapes" but this occurs outside of ha's. States his eye doc wants him to wear glasses for this but unable to view any notes per EMR, will refer to ophtho. Pt " also endorses memory issues, weakness/numbness/cramping/soreness in his extremities, will refer to neurology. He also reports anger and overthinking causes ha's, will refer to psychiatry. Pt endorses loud snoring and trouble stayhing sleep often awakening w/ a HA, Will refer to sleep clinic. He endorses nausea, vomiting, stomach upset, photophobia, osmophobia, dizziness, ipsilateral rhinorrhea, bilateral eye redness, ipsilateral eye tearing, ipsilateral facial swelling, slurred speech, and overall generalized sweating. Pt denies all other ROS includiing aura's, temporal or positional components, tinnitus/whooshing sounds, fever, recent covid infection, trauma.     Of note, pt's bp elevated at todays visit. States he sees a pcp outside of [facility] which he is working on mgmt with. Currently takes hydralazine 50mg bid prn. Denies any other current medictions for bp. Denies taking verapamil. Denies current symptoms.     Treatments Tried   Verapamil - unsure if he took or takes this. Was given when recently admitted. Possibly helped.   Zonisamide 300mg nightly - currently takes for epilepsy  Vimpat- currently takes for epilepsy  Nurtec - unsure if he tried this or if it helped  Maxalt 10mg - recalls trying this, did not help  Triptans -a void 2/2 elevated bp and h/o svt  Mobic 7.5mg bid for the last 1-2 wks - not helping per pt  Aspirin - helped in the past  Tylenol  - helped in the past    Social History  Alcohol - denies  Smoke - cigarettes 1 pack per month  Recreational Drug Use- denies  Occupation - not working currently    Current Medications:    Current Outpatient Medications:     lacosamide (VIMPAT) 150 mg Tab tablet, Take 1 tablet (150 mg total) by mouth every 12 (twelve) hours., Disp: 60 tablet, Rfl: 5    zonisamide (ZONEGRAN) 100 MG Cap, Take 2 capsules (200 mg total) by mouth every evening., Disp: 180 capsule, Rfl: 3    rimegepant 75 mg odt, At the onset of a HA, Place ODT tablet on or under the tongue once  daily as needed for headaches, Disp: 8 tablet, Rfl: 5    verapamiL (VERELAN) 120 MG C24P, Take 1 capsule (120 mg total) by mouth once daily., Disp: 30 capsule, Rfl: 5    Review of Systems - as per HPI, otherwise a balanced 10 systems review is negative.    OBJECTIVE:  Vitals:  BP (!) 152/100 (BP Location: Right arm, Patient Position: Sitting, BP Method: Medium (Automatic))   Pulse 94   Ht 6' (1.829 m)   Wt 73.9 kg (163 lb)   BMI 22.11 kg/m²     Physical Exam   Constitutional: he appears well-developed and well-nourished. he is well groomed. NAD  HENT:    Head: Normocephalic and atraumatic, Frontalis was TTP, temporalis was TTP   Eyes: Conjunctivae and EOM are normal. Pupils are equal, round, and reactive to light  Neck: Neck supple. Occiput and trapezius TTP, traps tight   Musculoskeletal: Normal range of motion. No joint stiffness. No vertebral point tenderness.  Skin: Skin is warm and dry.  Psychiatric: Normal mood and affect.     Neuro exam:    Mental status:  The patient is alert and oriented to person, place and time.  Language is intact and fluent  Remote and recent memory are intact  Normal attention and concentration  Mood is stable    Cranial Nerves:  Pupils are equal and reactive to light.    Extraocular movements are intact and without nystagmus.    Facial movement is symmetric.  Facial sensation is intact.    Hearing is intact   Severely limited ROM of neck in all (6) directions without pain  Shoulder shrug symmetrical.    Coordination:     Finger to nose - normal and symmetric bilaterally     Motor:  Normal muscle bulk and symmetry. No fasciculations were noted.   Tremor not apparent   Pronator drift not apparent.    strength was strong and symmetric  RUE:appropriate against gravity and medium force as tested 4/5  LUE: appropriate against gravity and medium force as tested 4/5  RLE:appropriate against gravity and medium force as tested 4/5              LLE: appropriate against gravity and medium  force as tested 5/5    Sensory:  RUE  intact light touch  LUE intact light touch  RLE intact light touch  LLE intact light touch    Gait:   Normal gait    Review of Data:   Notes from neuro reviewed   Labs:  Admission on 10/10/2023, Discharged on 10/12/2023   Component Date Value Ref Range Status    HIV 1/2 Ag/Ab 10/10/2023 Non-reactive  Non-reactive Final    Hepatitis C Ab 10/10/2023 Non-reactive  Non-reactive Final    WBC 10/10/2023 4.74  3.90 - 12.70 K/uL Final    RBC 10/10/2023 4.71  4.60 - 6.20 M/uL Final    Hemoglobin 10/10/2023 14.8  14.0 - 18.0 g/dL Final    Hematocrit 10/10/2023 45.9  40.0 - 54.0 % Final    MCV 10/10/2023 98  82 - 98 fL Final    MCH 10/10/2023 31.4 (H)  27.0 - 31.0 pg Final    MCHC 10/10/2023 32.2  32.0 - 36.0 g/dL Final    RDW 10/10/2023 12.9  11.5 - 14.5 % Final    Platelets 10/10/2023 241  150 - 450 K/uL Final    MPV 10/10/2023 9.6  9.2 - 12.9 fL Final    Immature Granulocytes 10/10/2023 0.2  0.0 - 0.5 % Final    Gran # (ANC) 10/10/2023 2.2  1.8 - 7.7 K/uL Final    Immature Grans (Abs) 10/10/2023 0.01  0.00 - 0.04 K/uL Final    Lymph # 10/10/2023 2.2  1.0 - 4.8 K/uL Final    Mono # 10/10/2023 0.3  0.3 - 1.0 K/uL Final    Eos # 10/10/2023 0.1  0.0 - 0.5 K/uL Final    Baso # 10/10/2023 0.02  0.00 - 0.20 K/uL Final    nRBC 10/10/2023 0  0 /100 WBC Final    Gran % 10/10/2023 45.5  38.0 - 73.0 % Final    Lymph % 10/10/2023 45.8  18.0 - 48.0 % Final    Mono % 10/10/2023 7.0  4.0 - 15.0 % Final    Eosinophil % 10/10/2023 1.1  0.0 - 8.0 % Final    Basophil % 10/10/2023 0.4  0.0 - 1.9 % Final    Differential Method 10/10/2023 Automated   Final    Sodium 10/10/2023 141  136 - 145 mmol/L Final    Potassium 10/10/2023 4.4  3.5 - 5.1 mmol/L Final    Chloride 10/10/2023 105  95 - 110 mmol/L Final    CO2 10/10/2023 27  23 - 29 mmol/L Final    Glucose 10/10/2023 79  70 - 110 mg/dL Final    BUN 10/10/2023 11  6 - 20 mg/dL Final    Creatinine 10/10/2023 0.9  0.5 - 1.4 mg/dL Final    Calcium 10/10/2023  10.1  8.7 - 10.5 mg/dL Final    Total Protein 10/10/2023 8.0  6.0 - 8.4 g/dL Final    Albumin 10/10/2023 4.4  3.5 - 5.2 g/dL Final    Total Bilirubin 10/10/2023 0.5  0.1 - 1.0 mg/dL Final    Alkaline Phosphatase 10/10/2023 78  55 - 135 U/L Final    AST 10/10/2023 15  10 - 40 U/L Final    ALT 10/10/2023 10  10 - 44 U/L Final    eGFR 10/10/2023 >60.0  >60 mL/min/1.73 m^2 Final    Anion Gap 10/10/2023 9  8 - 16 mmol/L Final    Specimen UA 10/10/2023 Urine, Clean Catch   Final    Color, UA 10/10/2023 Yellow  Yellow, Straw, Elizabeth Final    Appearance, UA 10/10/2023 Clear  Clear Final    pH, UA 10/10/2023 6.0  5.0 - 8.0 Final    Specific Gravity, UA 10/10/2023 1.020  1.005 - 1.030 Final    Protein, UA 10/10/2023 Negative  Negative Final    Glucose, UA 10/10/2023 Negative  Negative Final    Ketones, UA 10/10/2023 Negative  Negative Final    Bilirubin (UA) 10/10/2023 Negative  Negative Final    Occult Blood UA 10/10/2023 Negative  Negative Final    Nitrite, UA 10/10/2023 Negative  Negative Final    Leukocytes, UA 10/10/2023 2+ (A)  Negative Final    Levetiracetam Lvl 10/10/2023 13.8  3.0 - 60.0 ug/mL Final    POCT Glucose 10/10/2023 86  70 - 110 mg/dL Final    RBC, UA 10/10/2023 1  0 - 4 /hpf Final    WBC, UA 10/10/2023 11 (H)  0 - 5 /hpf Final    Squam Epithel, UA 10/10/2023 0  /hpf Final    Microscopic Comment 10/10/2023 SEE COMMENT   Final    Urine Culture, Routine 10/10/2023 No growth   Final    Sodium 10/11/2023 141  136 - 145 mmol/L Final    Potassium 10/11/2023 4.1  3.5 - 5.1 mmol/L Final    Chloride 10/11/2023 109  95 - 110 mmol/L Final    CO2 10/11/2023 25  23 - 29 mmol/L Final    Glucose 10/11/2023 82  70 - 110 mg/dL Final    BUN 10/11/2023 11  6 - 20 mg/dL Final    Creatinine 10/11/2023 1.1  0.5 - 1.4 mg/dL Final    Calcium 10/11/2023 9.5  8.7 - 10.5 mg/dL Final    Total Protein 10/11/2023 6.8  6.0 - 8.4 g/dL Final    Albumin 10/11/2023 3.8  3.5 - 5.2 g/dL Final    Total Bilirubin 10/11/2023 0.5  0.1 - 1.0 mg/dL  Final    Alkaline Phosphatase 10/11/2023 73  55 - 135 U/L Final    AST 10/11/2023 12  10 - 40 U/L Final    ALT 10/11/2023 7 (L)  10 - 44 U/L Final    eGFR 10/11/2023 >60.0  >60 mL/min/1.73 m^2 Final    Anion Gap 10/11/2023 7 (L)  8 - 16 mmol/L Final    Magnesium 10/11/2023 2.2  1.6 - 2.6 mg/dL Final    Phosphorus 10/11/2023 3.1  2.7 - 4.5 mg/dL Final    WBC 10/11/2023 4.45  3.90 - 12.70 K/uL Final    RBC 10/11/2023 4.46 (L)  4.60 - 6.20 M/uL Final    Hemoglobin 10/11/2023 14.6  14.0 - 18.0 g/dL Final    Hematocrit 10/11/2023 44.5  40.0 - 54.0 % Final    MCV 10/11/2023 100 (H)  82 - 98 fL Final    MCH 10/11/2023 32.7 (H)  27.0 - 31.0 pg Final    MCHC 10/11/2023 32.8  32.0 - 36.0 g/dL Final    RDW 10/11/2023 12.8  11.5 - 14.5 % Final    Platelets 10/11/2023 221  150 - 450 K/uL Final    MPV 10/11/2023 10.0  9.2 - 12.9 fL Final    Immature Granulocytes 10/11/2023 0.2  0.0 - 0.5 % Final    Gran # (ANC) 10/11/2023 1.5 (L)  1.8 - 7.7 K/uL Final    Immature Grans (Abs) 10/11/2023 0.01  0.00 - 0.04 K/uL Final    Lymph # 10/11/2023 2.4  1.0 - 4.8 K/uL Final    Mono # 10/11/2023 0.5  0.3 - 1.0 K/uL Final    Eos # 10/11/2023 0.1  0.0 - 0.5 K/uL Final    Baso # 10/11/2023 0.02  0.00 - 0.20 K/uL Final    nRBC 10/11/2023 0  0 /100 WBC Final    Gran % 10/11/2023 33.1 (L)  38.0 - 73.0 % Final    Lymph % 10/11/2023 53.3 (H)  18.0 - 48.0 % Final    Mono % 10/11/2023 11.0  4.0 - 15.0 % Final    Eosinophil % 10/11/2023 2.0  0.0 - 8.0 % Final    Basophil % 10/11/2023 0.4  0.0 - 1.9 % Final    Differential Method 10/11/2023 Automated   Final    Sodium 10/12/2023 138  136 - 145 mmol/L Final    Potassium 10/12/2023 4.4  3.5 - 5.1 mmol/L Final    Chloride 10/12/2023 107  95 - 110 mmol/L Final    CO2 10/12/2023 23  23 - 29 mmol/L Final    Glucose 10/12/2023 83  70 - 110 mg/dL Final    BUN 10/12/2023 15  6 - 20 mg/dL Final    Creatinine 10/12/2023 1.3  0.5 - 1.4 mg/dL Final    Calcium 10/12/2023 9.5  8.7 - 10.5 mg/dL Final    Total Protein  10/12/2023 6.9  6.0 - 8.4 g/dL Final    Albumin 10/12/2023 3.8  3.5 - 5.2 g/dL Final    Total Bilirubin 10/12/2023 0.5  0.1 - 1.0 mg/dL Final    Alkaline Phosphatase 10/12/2023 71  55 - 135 U/L Final    AST 10/12/2023 13  10 - 40 U/L Final    ALT 10/12/2023 7 (L)  10 - 44 U/L Final    eGFR 10/12/2023 >60.0  >60 mL/min/1.73 m^2 Final    Anion Gap 10/12/2023 8  8 - 16 mmol/L Final    Magnesium 10/12/2023 2.0  1.6 - 2.6 mg/dL Final    Phosphorus 10/12/2023 4.1  2.7 - 4.5 mg/dL Final    WBC 10/12/2023 4.63  3.90 - 12.70 K/uL Final    RBC 10/12/2023 4.60  4.60 - 6.20 M/uL Final    Hemoglobin 10/12/2023 14.6  14.0 - 18.0 g/dL Final    Hematocrit 10/12/2023 45.0  40.0 - 54.0 % Final    MCV 10/12/2023 98  82 - 98 fL Final    MCH 10/12/2023 31.7 (H)  27.0 - 31.0 pg Final    MCHC 10/12/2023 32.4  32.0 - 36.0 g/dL Final    RDW 10/12/2023 12.9  11.5 - 14.5 % Final    Platelets 10/12/2023 224  150 - 450 K/uL Final    MPV 10/12/2023 9.6  9.2 - 12.9 fL Final    Immature Granulocytes 10/12/2023 0.2  0.0 - 0.5 % Final    Gran # (ANC) 10/12/2023 1.2 (L)  1.8 - 7.7 K/uL Final    Immature Grans (Abs) 10/12/2023 0.01  0.00 - 0.04 K/uL Final    Lymph # 10/12/2023 2.7  1.0 - 4.8 K/uL Final    Mono # 10/12/2023 0.6  0.3 - 1.0 K/uL Final    Eos # 10/12/2023 0.1  0.0 - 0.5 K/uL Final    Baso # 10/12/2023 0.02  0.00 - 0.20 K/uL Final    nRBC 10/12/2023 0  0 /100 WBC Final    Gran % 10/12/2023 25.3 (L)  38.0 - 73.0 % Final    Lymph % 10/12/2023 59.0 (H)  18.0 - 48.0 % Final    Mono % 10/12/2023 12.7  4.0 - 15.0 % Final    Eosinophil % 10/12/2023 2.4  0.0 - 8.0 % Final    Basophil % 10/12/2023 0.4  0.0 - 1.9 % Final    Differential Method 10/12/2023 Automated   Final     Imaging:  Results for orders placed or performed during the hospital encounter of 10/10/23   CT Head Without Contrast    Narrative    EXAMINATION:  CT HEAD WITHOUT CONTRAST    CLINICAL HISTORY:  Headache, chronic, new features or increased frequency;    TECHNIQUE:  Low dose  axial images were obtained through the head.  Coronal and sagittal reformations were also performed. Contrast was not administered.    COMPARISON:  CT examination of the brain without contrast October 12, 2011    FINDINGS:  The ventricular system, sulcal pattern and parenchymal attenuation characteristics appear appropriate for age.  There is no evidence for intracranial mass, mass effect or midline shift.  There is no evidence for acute intracranial hemorrhage.  Appropriate CSF spaces are seen at the skull base.    There is appearance of extracranial soft tissue swelling suggested overlying the frontal region for which clinical and historical correlation is needed.  In addition overlying the right zygomatic arch as seen on series 3 axial image 16 there is a small oval finding within the extracranial soft tissues measuring up to approximately 11.6 mm in size, this measures approximately 8.9 Hounsfield units, this may relate to a small sebaceous cyst, clinical and historical correlation is needed.  The visualized orbits appear intact.  The mastoid air cells appear well aerated.  Mild paranasal sinus mucosal thickening is noted, suspected small mucous retention cyst of the left maxillary antrum noted.  The visualized osseous structures appear intact.      Impression    There is no evidence for acute intracranial process.    Extracranial soft tissue findings as discussed above.    Mild paranasal sinus findings.      Electronically signed by: Reyes Kline  Date:    10/11/2023  Time:    02:47     Note: I have independently reviewed any/all imaging/labs/tests and agree with the report (s) as documented.  Any discrepancies will be as noted/demarcated by free text.  LEANN MARTINEZ 10/31/2023    ASSESSMENT:  1. Chronic migraine without aura without status migrainosus, not intractable    2. Nonintractable headache, unspecified chronicity pattern, unspecified headache type    3. Elevated BP without diagnosis of hypertension    4.  "Memory change    5. Visual changes    6. Decreased ROM of neck    7. Paresthesias    8. Weakness    9. Snoring    10. Stress    11. Tobacco use          PLAN:  - Discussed symptoms appear to be consistent with migraines, DDX includes cluster ha's, TACs, discussed treatment options and patient agreed with the following plan:  - pt to begin tracking ha's as he was unable to describe many features of ha's today, pt verbalizes that this will be challenging for him 2/2 memory, provided pt w/ printed ha diary's he can place at bedside/fridge, etc to assist. Also provided summary of plan on AVS given to pt today   - ppx - start verapamil 120mg/d for dual purpose of ha's   - abortive - start nurtec prn, d/c all otcs, maxalt, mobic  - BP - elevated today, advised pt to monitor and inform pcp of readings, ED precautions discussed, states he takes hydralazine only for bp, will start verapamil for dual purpose, avodi triptans  - referral to ophtho 2/2 pt's chronic visual complaint of not being able to see anything but shapes, states he has previously seen an eye doc for this but unable to view notes, discussed ED precuations  - referral to neuro for other neuro complaints including weakness on exam, visual complaints, memory complaints, "slurred speech"  - referral to sleep med for ?binta. Endorses loud snoring, frequent night time awakenings, and awaken w/ ha  - referral to PT 2/2 decreased neck ROM and muscle tension  - referral to psych for pt reported stress which triggers ha's  - risks, benefits, and potential side effects of verapamil, nurtec discussed   - alternative treatment options offered   - importance of healthy diet, regular exercise and sleep hygiene in the treatment of headaches    - Start tracking headaches via Migraine Yaakov naomie on phone   - RTC in 2-3 mo     Orders Placed This Encounter    Ambulatory referral/consult to Ophthalmology    Ambulatory referral/consult to Neurology    Ambulatory referral/consult to " Sleep Disorders    Ambulatory consult to Psychiatry    Ambulatory referral/consult to Physical/Occupational Therapy    verapamiL (VERELAN) 120 MG C24P    rimegepant 75 mg odt       I have discussed realistic goals of care with patient at length as well as medication options, and need for lifestyle adjustment. I have explained that treatment will take time. We have agreed that the goal will be to reduce frequency/intensity/quantity of HA, not to be completely HA free. I have explained my non narcotic policy regarding headache treatment.    Patient agreeable to work on lifestyle adjustments.    Questions and concerns were sought and answered to the patient's stated verbal satisfaction.  The patient verbalizes understanding and agreement with the above stated treatment plan.     CC: No, Primary Doctor      Inna George PA-C  [facility]  [phone]    Dr. Núñez was available during today's encounter.     I spent 105 minutes on the day of this encounter preparing for, treating and managing this patient presenting with headaches.

## 2023-11-01 ENCOUNTER — CLINICAL SUPPORT (OUTPATIENT)
Dept: REHABILITATION | Facility: HOSPITAL | Age: 53
End: 2023-11-01
Payer: MEDICAID

## 2023-11-01 DIAGNOSIS — R53.1 DECREASED STRENGTH: ICD-10-CM

## 2023-11-01 DIAGNOSIS — M62.89 MUSCLE TONE INCREASED: ICD-10-CM

## 2023-11-01 DIAGNOSIS — R29.898 DECREASED ROM OF NECK: ICD-10-CM

## 2023-11-01 PROCEDURE — 97162 PT EVAL MOD COMPLEX 30 MIN: CPT | Mod: PN | Performed by: PHYSICAL THERAPIST

## 2023-11-01 PROCEDURE — 97163 PT EVAL HIGH COMPLEX 45 MIN: CPT | Mod: PN | Performed by: PHYSICAL THERAPIST

## 2023-11-01 NOTE — PATIENT INSTRUCTIONS
Axial Extension (Chin Tuck)        Gently pull chin in while lengthening back of neck. Hold 5 seconds. Repeat 10 times. Do 2 sets, 1 sessions per day.     Levator Stretch        Grasp seat or sit on hand on side to be stretched. Turn head toward other side and look down. Use hand on head to gently stretch neck in that position. Hold 15-30 seconds. Repeat on other side.  Repeat 3 times each side. Do 1-2 sessions per day.    Upper Trapezius Stretch        Look straight ahead. Gently grasp right side of head while reaching behind back with other hand. Tilt head away until a gentle stretch is felt. Hold 15-30 seconds. Repeat on opposite side.   Repeat 3 times each side. Do 1-2 sessions per day.    AROM, Rotation        Sit or stand, head comfortable, centered position. Turn head slowly to look over one shoulder. Hold 5-10 seconds. Repeat to other side.  Repeat 10 times per session. Do 1-2 sessions per day.

## 2023-11-02 PROBLEM — M62.89 MUSCLE TONE INCREASED: Status: ACTIVE | Noted: 2023-11-02

## 2023-11-02 PROBLEM — R53.1 DECREASED STRENGTH: Status: ACTIVE | Noted: 2023-11-02

## 2023-11-02 PROBLEM — R29.898 DECREASED ROM OF NECK: Status: ACTIVE | Noted: 2023-11-02

## 2023-11-02 NOTE — PLAN OF CARE
[facility]  Physical Therapy Neurological Rehabilitation Initial Evaluation    Name: Irvin Meza  Clinic Number: [id number]    Therapy Diagnosis:   Encounter Diagnoses   Name Primary?    Decreased ROM of neck     Muscle tone increased     Decreased strength      Physician: Inna George PA-C    Physician Orders: PT Eval and Treat   Medical Diagnosis from Referral: R29.898 (ICD-10-CM) - Decreased ROM of neck  Evaluation Date: 11/1/2023  Authorization Period Expiration: 10/30/2024  Plan of Care Expiration: 12/27/23  Visit # / Visits authorized: 1/ 20    PN Due: 12/2/23     Time In: 1602  Time Out: 1639  Total Billable Time: 37 minutes    Precautions: seizures, fall    Subjective   Date of onset: ~6/2023  History of current condition - Irvin reports: He has history of frequent seizures (since birth) where he sometimes blacks out and falls. He does not drive, leave his house often, or walk too far alone. His reason for presenting to therapy is frequent left sided headaches. His headaches have increased in intensity and frequency (daily) over the past three months. Headache is present behind the left eye and left side of suboccipital region. His headaches are accompanied by neck pain, shoulder ache, and sleep disruption. Lights and screens increase headaches. Headaches sometimes trigger seizure or come after seizure. Seizures can be triggered by light, smell, or heat. PMHx: uncontrolled seizures, paroxysmal supraventricular tachycardia     Medical History:   Past Medical History:   Diagnosis Date    Paroxysmal SVT (supraventricular tachycardia) 10/11/2023    Seizures        Surgical History:   Irvin Meza  has no past surgical history on file.    Medications: 1  Irvin has a current medication list which includes the following prescription(s): lacosamide, rimegepant, verapamil, and zonisamide.    Allergies:   Review of patient's allergies indicates:  No Known Allergies       Prior  Therapy: no  Social History: lives with mother  Falls: frequent falls, 1-2 in last 2 weeks  DME: none  Home Environment: [address], bilateral railings  Exercise Routine / History: been years since exercise; walks to and from car  Occupation: does not work  Prior Level of Function: Due to seizures, patient has limited any physical activity for years to avoid overheating which is a trigger. He reports walking around his house and just to car and back.  Current Level of Function: Increased need to stay inside and reduce activity due to increased headaches and neck pain. Pain wakes him up at night. Patient is unable to live alone      Pain:  Current 7/10, worst 10/10, best 5/10   Location: behind left eye, left neck, forehead  Description: Throbbing headache, teary left eye, achy shoulders  Aggravating Factors: Laying, lights, screens  Easing Factors: Walking around his house, time, stretching out arms behind him    Pts goals: Headaches to go away    Objective     Mental status: alert, oriented to person, place, and time  Appearance: Casually dressed  Behavior:  calm and cooperative  Attention Span and Concentration:  Normal    Dominant hand: information not collected    Posture Alignment : slouched posture, forward head posture, head/neck slightly side bent to the left    Sensation:  Light Touch: intact           Proprioception: not tested      ORTHOPEDIC ASSESSMENT:  Muscle Tone: increased tone of left upper trapezius, left suboccipitals  TTP: significant TTP to left upper trapezius, moderate TTP to left suboccipitals and C2-4 spinous processes, minimal TTP to left sternocleidomastoid      RANGE OF MOTION:  CERVICAL SPINE AROM:   Flexion: (norm: 80-90 deg) 40 deg   Extension: (norm: 70-80 deg) 47 deg   *pain in posterior neck   Left Sidebend: (norm: 20-45 deg) 32 deg   Right Sidebend: (norm: 20-45 deg) 19 deg   *pain to left UT   Left Rotation: (norm: 70-90 deg) 22 deg   Right Rotation: (norm: 70-90 deg) 44 deg   *pain  to left UT       UPPER EXTREMITY  (R) UE: limited as follows: shoulder flexion AROM 144 degrees  (L) UE: limited as follows: shoulder flexion AROM 125 degrees       STRENGTH: manual muscle test grades below   Deep neck flexors: 2/5, unable to hold without engaging accessory muscles     Upper Extremity Strength   MARKUS HAIRSTON   Gross shoulder (flexion):  4+/5 4-/5 *achy   Lats:  Not tested Not tested   Low traps:  4+/5 4/5   Mid traps:  Not tested Not tested   Rhomboids:  5/5 4/5         TREATMENT   Treatment Time In: 1640  Treatment Time Out: 1645  Total Treatment time separate from Evaluation: 5 minutes    Irvin received therapeutic exercises to develop strength, endurance, ROM, flexibility, and posture for 5 minutes including:  Upper trap stretch left 2 x 30 sec  Levator stretch left 2 x 30 sec      Home Exercises and Patient Education Provided    Education provided:   - Begin HEP: Upper trap stretch, levator stretch, chin tucks, cervical rotation ROM (all seated)  - discussed association between cervical dysfunction and headache     Written Home Exercises Provided: yes.  Exercises were reviewed and Irvin was able to demonstrate them prior to the end of the session.  Irvin demonstrated good  understanding of the education provided.     See EMR under Patient Instructions for exercises provided 11/1/23.     Assessment   Irvin is a 53 y.o. male referred to outpatient Physical Therapy with a medical diagnosis of decreased range of motion of neck. Past medical history includes paroxysmal supraventricular tachycardia and seizures. He has daily headaches, reporting headaches related to seizures as well as headaches related to neck pain and stiffness. Headaches have been increasing/ worsening over the past 3 months. Pt presents with increased muscle tone and tenderness to palpation of left upper trapezius, left sternocleidomastoid, and left suboccipitals. His posture presents with a cervical side bend to the left due  to increased tone and pain. He has decreased cervical range of motion (left>right), decreased shoulder strength (left>right), and decreased deep neck flexor strength. The listed impairments are likely increasing the patient's frequency and intensity of left sided headaches and decreasing his activity level and tolerance. Patient would benefit from skilled physical therapy to improve cervical mobility and reduce headaches.    Pt prognosis is Fair.   Pt will benefit from skilled outpatient Physical Therapy to address the deficits stated above and in the chart below, provide pt/family education, and to maximize pt's level of independence.     Plan of care discussed with patient: Yes  Pt's spiritual, cultural and educational needs considered and patient is agreeable to the plan of care and goals as stated below:     Anticipated Barriers for therapy: seizures    Medical Necessity is demonstrated by the following  History  Co-morbidities and personal factors that may impact the plan of care Co-morbidities:   Paroxysmal SVT, seizures    Personal Factors:   attitudes     high   Examination  Body Structures and Functions, activity limitations and participation restrictions that may impact the plan of care Body Regions:   head  neck  upper extremities    Body Systems:    gross symmetry  ROM  strength  balance  seizures    Participation Restrictions:   Limited to household mobility and appointments  Headache limit actvity    Activity limitations:   Learning and applying knowledge  Intolerable to screens, lights    General Tasks and Commands  no deficits    Communication  no deficits    Mobility  lifting and carrying objects  walking  driving (bike, car, motorcycle)    Self care  no deficits    Domestic Life  doing house work (cleaning house, washing dishes, laundry)    Interactions/Relationships  no deficits    Life Areas  no deficits    Community and Social Life  community life         high   Clinical Presentation  Unpredictable  high   Decision Making/ Complexity Score: high     Goals:  Short Term Goals: 4 weeks   - Patient will improve left cervical rotation active range of motion to 30 degrees to demonstrate reduced muscle tone and reduce headache intensity.  - Patient will improve right cervical rotation active range of motion to 50 degrees to demonstrate reduced muscle tone and reduce headache intensity.  - Patient will improve cervical flexion active range of motion to 50 degrees to demonstrate reduced muscle tone and reduce headache intensity.  - Patient will be compliant with home exercise program 3x/week to further progress and reduce headaches.  - Patient will be able to perform deep neck flexor chin tuck in supine without recruitment of accessory muscles to demonstrate improved postural control.  - Patient will improve shoulder flexion and lower trap manual muscle test scores by half a grade to demonstrate improved strength.    Long Term Goals: 8 weeks   - Patient will improve left cervical rotation active range of motion to 40 degrees to demonstrate reduced muscle tone and reduce headache intensity.  - Patient will improve right cervical rotation active range of motion to 60 degrees to demonstrate reduced muscle tone and reduce headache intensity.  - Patient will improve cervical flexion active range of motion to 60 degrees to demonstrate reduced muscle tone and reduce headache intensity.  - Patient will be able to hold deep neck flexor chin tuck in supine for 5 seconds without recruitment of accessory muscles.  - Patient will report reduced left sided headache frequency to 4x/week or less to improve quality of life.  - Patient will improve shoulder flexion and lower trap manual muscle test scores by a full grade to demonstrate improved strength for postural control.      Plan   Plan of care Certification: 11/1/2023 to 12/27/23.    Outpatient Physical Therapy 2 times weekly for 8 weeks to include the following  interventions: Cervical/Lumbar Traction, Gait Training, Manual Therapy, Neuromuscular Re-ed, Patient Education, Therapeutic Activities, and Therapeutic Exercise.     Suboccipital release/manual PRN, chin tucks, thoracic rotations, address low trap/shoulder flexion    Karely Hoffman, SPT  11/02/2023       I, Bria Harris, DPT, certify that I was present in the room directing the student in service delivery and guiding them using my skilled judgment. As the co-signing therapist I have reviewed the students documentation and am responsible for the treatment, assessment, and plan.     Bria Harris, PT, DPT, CBIS  Board-Certified Clinical Specialist in Neurologic Physical Therapy    11/1/2023

## 2023-11-24 ENCOUNTER — DOCUMENTATION ONLY (OUTPATIENT)
Dept: REHABILITATION | Facility: HOSPITAL | Age: 53
End: 2023-11-24
Payer: MEDICAID

## 2023-11-24 NOTE — PROGRESS NOTES
PT/PTA STAFFING      Name: Irvin Meza  [facility] Number: [id number]    Date of staffin2023      DME/ orders needed: No    Changes to POC: address postural strength, ROM, and endurance. Patient has cervicogenic headache. Precaution: frequent seizures     Continue with POC: Yes    Bria Harris,DPT  2023

## 2023-11-27 ENCOUNTER — DOCUMENTATION ONLY (OUTPATIENT)
Dept: REHABILITATION | Facility: HOSPITAL | Age: 53
End: 2023-11-27

## 2023-11-27 NOTE — PROGRESS NOTES
Outpatient Therapy Compliance Update     Name: Irvin Meza  Clinic Number: [id number]       Physician: Inna George PA-C     Physician Orders: PT Eval and Treat   Medical Diagnosis from Referral: R29.898 (ICD-10-CM) - Decreased ROM of neck    Care Update      Today Irvin Meza no-showed to his Physical Therapy appointment on 11/27/2023 for 9:30am.       Date of next scheduled appointment: 11/30/2023      Sabrina Aguilera, PTA

## 2023-11-30 ENCOUNTER — DOCUMENTATION ONLY (OUTPATIENT)
Dept: REHABILITATION | Facility: HOSPITAL | Age: 53
End: 2023-11-30
Payer: MEDICAID

## 2023-11-30 NOTE — PROGRESS NOTES
Outpatient Therapy Compliance Update      Name: Irvin Meza  Clinic Number: [id number]        Physician: Inna George, LEANN     Physician Orders: PT Eval and Treat   Medical Diagnosis from Referral: R29.898 (ICD-10-CM) - Decreased ROM of neck     Care Update       Today Irvin Meza no-showed to his Physical Therapy appointment on 11/30/2023 for 12:30pm.         Date of next scheduled appointment: currently do not have anymore visits scheduled.         Sabrina Aguilera, PTA

## 2023-12-11 ENCOUNTER — OFFICE VISIT (OUTPATIENT)
Dept: NEUROLOGY | Facility: CLINIC | Age: 53
End: 2023-12-11
Payer: MEDICAID

## 2023-12-11 DIAGNOSIS — G43.709 CHRONIC MIGRAINE WITHOUT AURA WITHOUT STATUS MIGRAINOSUS, NOT INTRACTABLE: ICD-10-CM

## 2023-12-11 DIAGNOSIS — R20.2 PARESTHESIAS: ICD-10-CM

## 2023-12-11 DIAGNOSIS — F06.30 MOOD DISORDER DUE TO MEDICAL CONDITION: ICD-10-CM

## 2023-12-11 DIAGNOSIS — R41.3 MEMORY CHANGE: ICD-10-CM

## 2023-12-11 DIAGNOSIS — H53.9 VISUAL CHANGES: ICD-10-CM

## 2023-12-11 DIAGNOSIS — G40.319 GENERALIZED EPILEPSY, INTRACTABLE: Primary | ICD-10-CM

## 2023-12-11 DIAGNOSIS — G89.29 CHRONIC NONINTRACTABLE HEADACHE, UNSPECIFIED HEADACHE TYPE: ICD-10-CM

## 2023-12-11 DIAGNOSIS — G40.319 GENERALIZED EPILEPSY, INTRACTABLE: ICD-10-CM

## 2023-12-11 DIAGNOSIS — R51.9 CHRONIC NONINTRACTABLE HEADACHE, UNSPECIFIED HEADACHE TYPE: ICD-10-CM

## 2023-12-11 PROCEDURE — 99999 PR PBB SHADOW E&M-EST. PATIENT-LVL III: ICD-10-PCS | Mod: PBBFAC,,,

## 2023-12-11 PROCEDURE — 99215 OFFICE O/P EST HI 40 MIN: CPT | Mod: S$PBB,,,

## 2023-12-11 PROCEDURE — 99215 PR OFFICE/OUTPT VISIT, EST, LEVL V, 40-54 MIN: ICD-10-PCS | Mod: S$PBB,,,

## 2023-12-11 PROCEDURE — 99213 OFFICE O/P EST LOW 20 MIN: CPT | Mod: PBBFAC

## 2023-12-11 PROCEDURE — 99999 PR PBB SHADOW E&M-EST. PATIENT-LVL III: CPT | Mod: PBBFAC,,,

## 2023-12-11 PROCEDURE — 1159F MED LIST DOCD IN RCRD: CPT | Mod: CPTII,,,

## 2023-12-11 PROCEDURE — 1159F PR MEDICATION LIST DOCUMENTED IN MEDICAL RECORD: ICD-10-PCS | Mod: CPTII,,,

## 2023-12-11 RX ORDER — LACOSAMIDE 150 MG/1
150 TABLET ORAL EVERY 12 HOURS
Qty: 60 TABLET | Refills: 5 | OUTPATIENT
Start: 2023-12-11 | End: 2024-06-08

## 2023-12-11 RX ORDER — LACOSAMIDE 200 MG/1
200 TABLET ORAL 2 TIMES DAILY
Qty: 180 TABLET | Refills: 1 | Status: SHIPPED | OUTPATIENT
Start: 2023-12-11 | End: 2024-06-08

## 2023-12-11 RX ORDER — ZONISAMIDE 100 MG/1
300 CAPSULE ORAL NIGHTLY
Qty: 270 CAPSULE | Refills: 3 | Status: SHIPPED | OUTPATIENT
Start: 2023-12-11 | End: 2024-12-10

## 2023-12-11 RX ORDER — SERTRALINE HYDROCHLORIDE 25 MG/1
25 TABLET, FILM COATED ORAL EVERY MORNING
Qty: 90 TABLET | Refills: 3 | Status: SHIPPED | OUTPATIENT
Start: 2023-12-11 | End: 2024-12-10

## 2023-12-11 RX ORDER — ZONISAMIDE 100 MG/1
200 CAPSULE ORAL NIGHTLY
Qty: 180 CAPSULE | Refills: 3 | OUTPATIENT
Start: 2023-12-11 | End: 2024-12-10

## 2023-12-11 NOTE — PROGRESS NOTES
Name: Irvin Meza  MRN:[id number]   CSN: [id number]  Date of service: 12/11/2023  Age:53 y.o.   Gender:male   Referring Physician/Service: Maria R Blankenship PA-C  [id number] [place] [address]   The patient is here today with: self    Neurology Clinic:  Follow-up Visit    CHIEF COMPLAINT:  Epilepsy    Interval Events/ROS 12/11/2023:    Current ASM/SEs: lacosamide 150mg BID and zonisamide 100mg qhs (prescribed 200mg); no SE; transitioned from LEV to lacosamide during EMU admission (ineffective + mood SE)  Breakthrough seizures/events: no GTCs since EMU 10/11/2023 which is a major improvement, though does experience smaller events consisting of odd behavior/yelling/decreased responsiveness that still occur weekly; last event of this type was yesterday 12/10/23  Driving: no  Sleep: fair, some nights good, some nights stays up thinking a lot   Mood: up and down    Otherwise, no fever, no cold symptoms, no current headache, no changes in vision, no new weakness, no chest pain, no shortness of breath, no nausea, no vomiting, no diarrhea, no constipation, no tingling/numbness, no problems walking. May go live with one of his daughters out of state for a few months.     Recent Labs   Lab 07/11/21  1627 10/07/21  1645 02/11/22  1619 05/31/22  1125 10/10/23  1749   Levetiracetam Lvl <1.0 13.2 36.8 21.7 13.8   Zonisamide  --   --   --  2.6 L  --    Phenytoin Lvl <0.1 L  --   --   --   --      EMU 10/11/2023-10/12/2023  Currently maintained on Levetiracetam 1500 mg TID and Zonisamide 300 mg nightly with continued uncontrolled seizures and reports of significant mood effects of anger and depression.  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 Loaded with Lacosamide 400 mg x1 10/12, patient to continue  Lacosamide 150 mg BID maintenance dose in addition to Zonisamide 300 mg nightly. Discontinue Levetiracetam given mood side effects and continued seizures when taking. Outpatient follow up in Neurology Headache clinic for headache management, follow up as scheduled in Neurology Epilepsy clinic for further management. Discharged home in stable condition 10/12.      Interval Events/ROS 10/10/2023:    Current ASM/SEs: levetiracetam 1500mg TID (6am, 12pm, 10pm) and zonisamide 300mg nightly (10pm); SE mood issues   Breakthrough seizures/events: uncontrolled convulsions, continues to experience multiple GTCs weekly despite compliance with medications  Driving: no  Sleep: not well because of headaches that wake him up, reports he was recently dx w/ cluster headaches (intense pain to L side of head w/ unilateral tearing + rhinorrhea, occurring near-daily for the past few months)   Mood: depressed, apathetic towards living    Otherwise, no fever, no changes in vision, no new weakness, no chest pain, no shortness of breath, no nausea, no vomiting, no diarrhea, no constipation, no tingling/numbness, no problems walking.    Interval Events/ROS 8/4/2023:    Current ASM/SEs: levetiracetam 1500mg TID, does not recall taking zonisamide; states mom and daughter help remind him to take his meds; mood SE  Breakthrough seizures/events: brief 'black out' spells daily; last convulsive event was 7/10/2023 that occurred in the setting of missed medication per EMR -> [facility]  Driving: no  Sleep: not good  Mood: depressed, passive suicidal ideation, not taking sertraline due to GI side effects     Headaches. Otherwise, no fever, no cold symptoms, no changes in vision, no new weakness, no chest pain, no shortness of breath, no nausea, no vomiting, no diarrhea, no constipation, no tingling/numbness, no problems walking.     Interval Events/ROS 8/31/2022:    Current ASM/SEs: patient is taking levetiracetam 500mg TID and  zonisamide 100mg BID; no reported side effects    Breakthrough seizures/events: generalized convulsion 07/03/2022, prior to that 04/2022; blackout/dyscognitive episodes 3-4 in the past two months  Driving: no  Folic acid: no  Sleep: sleeps well if head is not hurting   Mood: okay    Frequent headaches 3-4 days per week for the past 2 months described as a throbbing pain to L side of head and behind L eye. Associated photophobia. Denies N/V. Relieved w/ excedrin migraine. Otherwise, no fever, no cold symptoms, no changes in vision, no new weakness, no chest pain, no shortness of breath, no nausea, no vomiting, no diarrhea, no constipation, no tingling/numbness, no problems walking.     Interval Events/ROS 5/31/2022:    Breakthrough event 04/03/2022 despite levetiracetam 1500mg BID and zonisamide 100mg BID (originally prescribed 300mg qhs but patient states he tries not to take a lot of medication). No missed doses. No reported side effects. No identifiable trigger of recent breakthrough event though does mention he is not sleeping well at night, not sure why. Not working. Trying to work with  regarding disability. Denies feeling depressed though mentions he does get angry a lot. No SI or HI. Otherwise, no fever, no cold symptoms, no headache, no changes in vision, no new weakness, no chest pain, no shortness of breath, no nausea, no vomiting, no diarrhea, no constipation, no tingling/numbness, no problems walking.    Interval Events/ROS 2/11/2022:  Seizure 1/18/2022, trying to get out. Black out. This year 1 seizure. Keppra is okay. 5-6 seizure over the whole year in 2021 despite compliance. Taking keppra. Caught a seizure in his sleep.  Head laceration.  Now a helmet has been ordered to protect him during sleep.  Covid vaccination and boosted. Trying to get bone density test. Pain especially in the back. Mood okay. Not working. Only way to get job is if he lied. Trying to getting disability. Letter written.  "Needs to get a . Close up vision is blurry. No eye doctor. Vimpat twice daily but he did not have any refills on this medications so did not continue it.  Lacosamide caused poor appetite. Otherwise, no fever, no cold symptoms, no new weakness, no chest pain, no shortness of breath, no nausea, no vomiting, no diarrhea, no constipation, no tingling/numbness, no problems walking, no mood issues.    HPI 10/7/2021:     Age of first seizure: born with seizures, seizures as an infant   Seizure Risk Factors: no family history of seizures, term, unclear about details of birth but does not think he stayed in the hospital long, no CNS infections, several head strikes with seizures only   Time of Last Seizure: week ago   # of lifetime Seizures: 1000+  Frequency of Seizures:  At most 2 in a day, 1-2 seizures per month  Seizure Triggers:  Overheating, strong smells like perfume and gasoline, lights and too much TV, etoh  Injuries/Hospitalization for seizures? Head strike, tongue bite, fractured arms, lost teeth (had to get teeth pulled)  Bone Health: Dexa at [facility] but does not know results      Auras:  Headache and "weird feeling" -> space out, will start stuttering     Seizure Events:   1. Generalized convusions, Banging head back, eye roll back, does not want anyone to touch him, will throw people around, urinary incontinence, tired afterwards, couple days to get back to normal, sore afterwards with a persistent headache   2. Black out, wander off, walk across the stress, act funny, can be combative, does not want to be touched, very tired    Current AED/SEs:  1. Levetiracetam 1000 mg twice daily SE still having seizures but frequently misses doses    Previous AED/SEs or reason for DC.   1. Phenytoin SE dental issues     EEG: [facility] at [facility] -> 3-4 Hz discharges per care everywhere tab    CT 2011: normal     Other Allergies: none      AED compliance, adherence: misses some doses     ROS 10/7/2021:  Vaccination. " Problems with taste after seizures. Otherwise, denies headache at this moment, loss of vision, blurred vision, diplopia, dysarthria, dysphagia, lightheadedness, vertigo, tinnitus or hearing difficulty. Denies difficulties producing or comprehending speech.  Denies focal weakness, numbness, paresthesias. Denies difficulty with gait. Denies cough, shortness of breath.  Denies chest pain or tightness, palpitations.  Denies nausea, vomiting, diarrhea, constipation or abdominal pain.  No bowel or bladder incontinence or retention.  No falls.       EXAM:   - Vitals: There were no vitals taken for this visit.   - General: Awake, cooperative, NAD.   - HEENT: NC/AT, edentulous  - Neck: free ROM  - Pulmonary: no increased WOB  - Cardiac: well perfused   - Abdomen: soft, nontender, nondistended  - Extremities: no edema  - Skin: no rashes or lesions noted.     NEURO EXAM:   - Mental Status: Awake, alert, oriented x 3. Able to relate history without difficulty. Attentive to examiner. Language is fluent with intact repetition and comprehension. Normal prosody. There were no paraphasic errors. Able to name both high and low frequency objects. Speech was mildly dysarthric (edentulous). Able to follow both midline and appendicular commands. There was no evidence of apraxia or neglect.    - Cranial Nerves:  VFF to confrontation. EOMI. No facial droop. Hearing intact to room voice. 5/5 strength in trapezii and SCM bilaterally.     - Motor: Normal bulk and tone throughout. No pronator drift bilaterally. No adventitious movements such as tremor or asterixis noted.     Delt Bic Tri WrE WrF  FFl FE IO IP Quad Ham TA Gastroc   R   5     5    5    5    5        5   5    5   5    5        5     5      5            L   5      5    5   5    5        5    5   5    5    5       5     5      5              - Sensory: No deficits to light touch. No extinction to DSS.  - Coordination: No dysmetria on FNF   - Gait: Good initiation. Narrow-based,  normal stride and arm swing. Romberg negative.    PLAN: 53-year-old man with intractable generalized epilepsy (3-4 hz generalized discharges on EEG) with frequent generalized convulsions and dyscognitive episodes with bizarre behaviors. Convulsions are currently controlled on lacosamide 150mg BID and zonisamide 100mg qhs, however, still w/ weekly dyscognitive episodes. Optimize regimen to lacosamide 200mg BID and zonisamide 300mg qhs, prescriptions transferred to mail pharmacy. Trial of sertraline 25mg qd for mood. Continue to follow w/ headache team for management of cluster headaches. Patient is comfortable with plan. All questions and concerns are addressed at this time. Follow up in about 3 months (around 3/11/2024).     Patient Instructions   You came to Epilepsy Clinic because of your seizure disorder.     Here is the plan we discussed today:  - we will increase lacosamide to 200mg twice daily   - we will also increase zonisamide to 300mg nightly, this medication is also helpful for headaches   - we would also like you to start taking a medication called sertraline to help with mood, please take 1 tablet (25mg) in the morning  - I will send your seizure medications to the mail pharmacy so that your prescriptions will get delivered to your house     Morning seizure medications:  1 lacosamide (200mg)  1 sertraline (25mg)    Nightly seizure medications:  1 lacosamide (200mg)  3 zonisamide (300mg)    Do not miss any doses of medication. If a dose of medication is missed, take it as soon as it is remembered even if that means doubling up on the dose. Get regular sleep. Go to sleep at the same time and wake up at the same time every day. People with epilepsy require more sleep than people without epilepsy.  Sleeping 10-12 hours a day can be normal for a person with epilepsy.  Every seizure makes it harder to prevent the next seizure. Epilepsy is associated with SUDEP, or sudden unexpected death in epilepsy.  The  risk is significantly higher if convulsive seizures are not well controlled. For more information, check out these websites: https://www.epilepsy.com/, https://www.epilepsyallianceamerica.org/, www.[place]-epilepsy.org, www.womenandepilepsy.org.  If you are interested in meeting other individuals in our epilepsy community, please reach out to the Epilepsy [place] ([phone], [phone], [email or web address]).  They organize many informative and fun activities in the region.  They can provide you invaluable information on how to get access to resources available for patients living with epilepsy as well as a rich community of like-minded individuals who are all learning to cope with the same issues.  It is very important to remember, you are not alone.     Per [place] law, no episodes of loss of consciousness for 6 months before driving.  Avoid dangerous situations.  For example, no baths/pools alone, no heights, no power tools.  Wear a bike helmet.  If breakthrough seizures occur that are different in character, frequency, or duration from normal episodes, please patient portal me or call the office and we will decide the next steps. If multiple seizures occur in a row without return back to baseline, 911 needs to be called.     Return to clinic in 3 months or sooner with issues.  Please patient portal with any questions or concerns.    Maria R Blankenship PA-C   Neurology-Epilepsy  [facility]    Forms/Letters/Disability/DMV Paperwork: We understand the importance of filling out forms and providing letters in a timely manner.  However, many of these forms have very tricky language and once an official form is submitted as part of the medical record, it can not be modified or erased.  Please work with us in order to get these forms filled out in the most complete, accurate, and efficient way. 1.  Once you are aware that a form will need to be completed, please make an  appointment.  A virtual appointment with Dr. Hui or Jesica is perfectly fine. 2.  Please fill out the form as much as you can.  There are many questions that we do not have an answer for.  Please bring a blank copy of the form and your partially filled out form to the clinic visit or send them to us over the portal.  We will complete the form together with you during the clinic visit, sign it, and either return it to you or send it to the correct destination.  Every form will require an appointment however we can fill out multiple forms at once if needed.  Please do not hesitate to reach out with any questions or concerns about this policy.  We are trying to make sure that we have a system in place to meet this need which works for everyone involved.  Thank you for your understanding.           Problem List Items Addressed This Visit          Neuro    Generalized epilepsy, intractable - Primary    Relevant Medications    lacosamide (VIMPAT) 200 mg Tab tablet    zonisamide (ZONEGRAN) 100 MG Cap    Headache     Other Visit Diagnoses       Mood disorder due to medical condition        Relevant Medications    sertraline (ZOLOFT) 25 MG tablet    Memory change        Visual changes        Paresthesias              Disclaimer: This note has been generated using voice-recognition software. There may be typographical errors that were missed during proof-reading.     LABS:  Recent Labs   Lab 02/11/22  1619 04/03/22  1518 09/04/22 2006 05/21/23  1650 10/10/23  1749 10/11/23  0349 10/12/23  0319   WBC 5.85  --   --   --    < > 4.45 4.63   Hemoglobin 14.9  --   --   --    < > 14.6 14.6   Hematocrit 45.2  --   --   --    < > 44.5 45.0   Platelets 231  --   --   --    < > 221 224   Sodium 141   < > 138 140   < > 141 138   Potassium 4.4   < > 4.0 3.8   < > 4.1 4.4   BUN 12   < > 13.2 9.4   < > 11 15   Creatinine 1.0   < > 1.12 1.14   < > 1.1 1.3   eGFR if  >60.0  --   --   --   --   --   --    eGFR    --    < > 88 L 78 L  --   --   --    eGFR if non  >60.0  --   --   --   --   --   --     < > = values in this interval not displayed.       Recent Labs   Lab 07/11/21  1627 10/07/21  1645 02/11/22  1619 05/31/22  1125 10/10/23  1749   Levetiracetam Lvl <1.0 13.2 36.8 21.7 13.8   Zonisamide  --   --   --  2.6 L  --    Phenytoin Lvl <0.1 L  --   --   --   --         IMAGING:  Recent imaging is personally reviewed with the patient.    None in the system.    PAST MEDICAL HISTORY:   Active Ambulatory Problems     Diagnosis Date Noted    Generalized epilepsy, intractable 10/07/2021    Poor compliance with medication 10/07/2021    Headache 10/10/2023    Intermittent chest pain 10/11/2023    Paroxysmal SVT (supraventricular tachycardia) 10/11/2023    Cluster headache, not intractable 09/12/2023    Decreased ROM of neck 11/02/2023    Muscle tone increased 11/02/2023    Decreased strength 11/02/2023     Resolved Ambulatory Problems     Diagnosis Date Noted    No Resolved Ambulatory Problems     Past Medical History:   Diagnosis Date    Seizures         PAST SURGICAL HISTORY: No past surgical history on file.     ALLERGIES: Patient has no known allergies.   CURRENT MEDICATIONS:   Current Outpatient Medications   Medication Sig Dispense Refill    lacosamide (VIMPAT) 150 mg Tab tablet Take 1 tablet (150 mg total) by mouth every 12 (twelve) hours. 60 tablet 5    rimegepant 75 mg odt At the onset of a HA, Place ODT tablet on or under the tongue once daily as needed for headaches 8 tablet 5    verapamiL (VERELAN) 120 MG C24P Take 1 capsule (120 mg total) by mouth once daily. 30 capsule 5    zonisamide (ZONEGRAN) 100 MG Cap Take 2 capsules (200 mg total) by mouth every evening. 180 capsule 3     No current facility-administered medications for this visit.        FAMILY HISTORY: No family history on file.      SOCIAL HISTORY:   Social History     Socioeconomic History    Marital status: Single   Tobacco Use     Smoking status: Every Day     Current packs/day: 1.00     Types: Cigarettes    Smokeless tobacco: Never   Substance and Sexual Activity    Alcohol use: Yes     Comment: every now and then    Drug use: No      Questions and concerns raised by the patient and family/care-giver(s) were addressed and they indicated understanding of everything discussed and agreed to plans as above.    Maria R Blankenship PA-C   Neurology-Epilepsy  [facility]    Collaborating physician, Dr. Becky Hui, was available during today's encounter.     I spent approximately 53 minutes on the day of this encounter preparing to see the patient, obtaining and reviewing history and results, performing a medically appropriate exam, counseling and educating the patient/family/caregiver, documenting clinical information, coordinating care, and ordering medications, tests, procedures, and referrals.

## 2023-12-11 NOTE — PATIENT INSTRUCTIONS
You came to Epilepsy Clinic because of your seizure disorder.     Here is the plan we discussed today:  - we will increase lacosamide to 200mg twice daily   - we will also increase zonisamide to 300mg nightly, this medication is also helpful for headaches   - we would also like you to start taking a medication called sertraline to help with mood, please take 1 tablet (25mg) in the morning  - I will send your seizure medications to the mail pharmacy so that your prescriptions will get delivered to your house     Morning seizure medications:  1 lacosamide (200mg)  1 sertraline (25mg)    Nightly seizure medications:  1 lacosamide (200mg)  3 zonisamide (300mg)    Do not miss any doses of medication. If a dose of medication is missed, take it as soon as it is remembered even if that means doubling up on the dose. Get regular sleep. Go to sleep at the same time and wake up at the same time every day. People with epilepsy require more sleep than people without epilepsy.  Sleeping 10-12 hours a day can be normal for a person with epilepsy.  Every seizure makes it harder to prevent the next seizure. Epilepsy is associated with SUDEP, or sudden unexpected death in epilepsy.  The risk is significantly higher if convulsive seizures are not well controlled. For more information, check out these websites: https://www.epilepsy.com/, https://www.epilepsyallianceamerica.org/, www.[place]-epilepsy.org, www.womenandepilepsy.org.  If you are interested in meeting other individuals in our epilepsy community, please reach out to the Epilepsy [place] ([phone], [phone], [email or web address]).  They organize many informative and fun activities in the region.  They can provide you invaluable information on how to get access to resources available for patients living with epilepsy as well as a rich community of like-minded individuals who are all learning to cope with the same issues.  It is very important to remember,  you are not alone.     Per [place] law, no episodes of loss of consciousness for 6 months before driving.  Avoid dangerous situations.  For example, no baths/pools alone, no heights, no power tools.  Wear a bike helmet.  If breakthrough seizures occur that are different in character, frequency, or duration from normal episodes, please patient portal me or call the office and we will decide the next steps. If multiple seizures occur in a row without return back to baseline, 911 needs to be called.     Return to clinic in 3 months or sooner with issues.  Please patient portal with any questions or concerns.    Maria R Blankenship PA-C   Neurology-Epilepsy  [facility]    Forms/Letters/Disability/DMV Paperwork: We understand the importance of filling out forms and providing letters in a timely manner.  However, many of these forms have very tricky language and once an official form is submitted as part of the medical record, it can not be modified or erased.  Please work with us in order to get these forms filled out in the most complete, accurate, and efficient way. 1.  Once you are aware that a form will need to be completed, please make an appointment.  A virtual appointment with Dr. Hui or Jesica is perfectly fine. 2.  Please fill out the form as much as you can.  There are many questions that we do not have an answer for.  Please bring a blank copy of the form and your partially filled out form to the clinic visit or send them to us over the portal.  We will complete the form together with you during the clinic visit, sign it, and either return it to you or send it to the correct destination.  Every form will require an appointment however we can fill out multiple forms at once if needed.  Please do not hesitate to reach out with any questions or concerns about this policy.  We are trying to make sure that we have a system in place to meet this need which works for everyone involved.  Thank you  for your understanding.

## 2024-01-08 ENCOUNTER — DOCUMENTATION ONLY (OUTPATIENT)
Dept: REHABILITATION | Facility: HOSPITAL | Age: 54
End: 2024-01-08
Payer: MEDICAID

## 2024-01-08 NOTE — PROGRESS NOTES
Outpatient Therapy Discharge Summary     Name: Irvin Meza  Clinic Number: [id number]    Therapy Diagnosis:        Encounter Diagnoses   Name Primary?    Decreased ROM of neck      Muscle tone increased      Decreased strength        Physician: Inna George, LEANN  Physician Orders: PT Eval and Treat   Medical Diagnosis from Referral: R29.898 (ICD-10-CM) - Decreased ROM of neck    Evaluation Date: 11/1/2023  Authorization Period Expiration: 10/30/2024  Plan of Care Expiration: 12/27/23  Visit # / Visits authorized: 1/ 20    Date of Last visit: 11/1/23  Total Visits Received: 1  Cancelled Visits: 0  No Show Visits: >2    OBJECTIVE     Not tested due to unexpected d/c, see physical therapy evaluation     Written Home Exercises Provided: Patient instructed to cont prior HEP.  Irvin demonstrated fair  understanding of the education provided.     See EMR under Patient Instructions for exercises provided prior visit.      Assessment    53 year old male with diagnosis of decreased ROM of neck referred to [facility] received skilled outpatient PT 11/1/23. Physical therapy was recommended to improve muscle integrity and flexibility of neck to decrease headache occurrence. Patient was instructed in home exercise program to address cervical range of Motion. Patient did not attend any follow up physical therapy sessions.    Goals:   Short Term Goals:    - Patient will improve left cervical rotation active range of motion to 30 degrees to demonstrate reduced muscle tone and reduce headache intensity. Not met  - Patient will improve right cervical rotation active range of motion to 50 degrees to demonstrate reduced muscle tone and reduce headache intensity. Not met  - Patient will improve cervical flexion active range of motion to 50 degrees to demonstrate reduced muscle tone and reduce headache intensity. Not met  - Patient will be compliant with home exercise program 3x/week to further progress and  reduce headaches. Not met  - Patient will be able to perform deep neck flexor chin tuck in supine without recruitment of accessory muscles to demonstrate improved postural control. Not met  - Patient will improve shoulder flexion and lower trap manual muscle test scores by half a grade to demonstrate improved strength. Not met     Long Term Goals:   - Patient will improve left cervical rotation active range of motion to 40 degrees to demonstrate reduced muscle tone and reduce headache intensity. Not met  - Patient will improve right cervical rotation active range of motion to 60 degrees to demonstrate reduced muscle tone and reduce headache intensity. Not met  - Patient will improve cervical flexion active range of motion to 60 degrees to demonstrate reduced muscle tone and reduce headache intensity.Not met  - Patient will be able to hold deep neck flexor chin tuck in supine for 5 seconds without recruitment of accessory muscles.Not met  - Patient will report reduced left sided headache frequency to 4x/week or less to improve quality of life.Not met  - Patient will improve shoulder flexion and lower trap manual muscle test scores by a full grade to demonstrate improved strength for postural control. Not met    Discharge reason: Patient has not attended therapy since evaluation     Plan   This patient is discharged from Physical Therapy       Bria Harris, PT, DPT,   Board-Certified Clinical Specialist in Neurologic Physical Therapy   Certified Brain Injury Specialist    1/8/2024

## 2024-01-24 ENCOUNTER — TELEPHONE (OUTPATIENT)
Dept: NEUROLOGY | Facility: CLINIC | Age: 54
End: 2024-01-24
Payer: MEDICAID

## 2024-01-24 NOTE — TELEPHONE ENCOUNTER
----- Message from Eloise Mireles MA sent at 1/22/2024  1:37 PM CST -----  Regarding: FW: Reschedule Appointment  Contact: [phone]    ----- Message -----  From: Yudy Light  Sent: 1/22/2024   1:34 PM CST  To: Ariel Keith Staff  Subject: Reschedule Appointment                           Calling in regards to rescheduling appointment as soon as possible. Please call and schedule.

## 2024-01-30 ENCOUNTER — TELEPHONE (OUTPATIENT)
Dept: NEUROLOGY | Facility: CLINIC | Age: 54
End: 2024-01-30
Payer: MEDICAID

## 2024-01-30 NOTE — TELEPHONE ENCOUNTER
----- Message from Juani Smith sent at 1/30/2024  8:06 AM CST -----  Regarding: Appt  Contact: Pt@[phone]  Pt requesting a call a back to rescheduled appt due to no transportation please call pt to reschedule @[phone]

## 2024-02-20 ENCOUNTER — OFFICE VISIT (OUTPATIENT)
Dept: NEUROLOGY | Facility: CLINIC | Age: 54
End: 2024-02-20
Payer: MEDICAID

## 2024-02-20 ENCOUNTER — TELEPHONE (OUTPATIENT)
Dept: NEUROLOGY | Facility: CLINIC | Age: 54
End: 2024-02-20

## 2024-02-20 VITALS
SYSTOLIC BLOOD PRESSURE: 137 MMHG | HEIGHT: 72 IN | DIASTOLIC BLOOD PRESSURE: 93 MMHG | HEART RATE: 97 BPM | WEIGHT: 163 LBS | BODY MASS INDEX: 22.08 KG/M2

## 2024-02-20 DIAGNOSIS — R03.0 ELEVATED BP WITHOUT DIAGNOSIS OF HYPERTENSION: ICD-10-CM

## 2024-02-20 DIAGNOSIS — G43.709 CHRONIC MIGRAINE WITHOUT AURA WITHOUT STATUS MIGRAINOSUS, NOT INTRACTABLE: ICD-10-CM

## 2024-02-20 DIAGNOSIS — G40.319 GENERALIZED EPILEPSY, INTRACTABLE: ICD-10-CM

## 2024-02-20 DIAGNOSIS — G89.29 CHRONIC NONINTRACTABLE HEADACHE, UNSPECIFIED HEADACHE TYPE: ICD-10-CM

## 2024-02-20 DIAGNOSIS — R51.9 CHRONIC NONINTRACTABLE HEADACHE, UNSPECIFIED HEADACHE TYPE: ICD-10-CM

## 2024-02-20 DIAGNOSIS — R51.9 NONINTRACTABLE HEADACHE, UNSPECIFIED CHRONICITY PATTERN, UNSPECIFIED HEADACHE TYPE: Primary | ICD-10-CM

## 2024-02-20 PROCEDURE — 99213 OFFICE O/P EST LOW 20 MIN: CPT | Mod: PBBFAC | Performed by: PHYSICIAN ASSISTANT

## 2024-02-20 PROCEDURE — 3075F SYST BP GE 130 - 139MM HG: CPT | Mod: CPTII,,, | Performed by: PHYSICIAN ASSISTANT

## 2024-02-20 PROCEDURE — 1160F RVW MEDS BY RX/DR IN RCRD: CPT | Mod: CPTII,,, | Performed by: PHYSICIAN ASSISTANT

## 2024-02-20 PROCEDURE — 99215 OFFICE O/P EST HI 40 MIN: CPT | Mod: S$PBB,,, | Performed by: PHYSICIAN ASSISTANT

## 2024-02-20 PROCEDURE — 3080F DIAST BP >= 90 MM HG: CPT | Mod: CPTII,,, | Performed by: PHYSICIAN ASSISTANT

## 2024-02-20 PROCEDURE — 99999 PR PBB SHADOW E&M-EST. PATIENT-LVL III: CPT | Mod: PBBFAC,,, | Performed by: PHYSICIAN ASSISTANT

## 2024-02-20 PROCEDURE — 1159F MED LIST DOCD IN RCRD: CPT | Mod: CPTII,,, | Performed by: PHYSICIAN ASSISTANT

## 2024-02-20 PROCEDURE — 3008F BODY MASS INDEX DOCD: CPT | Mod: CPTII,,, | Performed by: PHYSICIAN ASSISTANT

## 2024-02-20 RX ORDER — VERAPAMIL HYDROCHLORIDE 180 MG/1
180 CAPSULE, EXTENDED RELEASE ORAL DAILY
Qty: 30 CAPSULE | Refills: 5 | Status: SHIPPED | OUTPATIENT
Start: 2024-02-20 | End: 2024-08-18

## 2024-02-20 RX ORDER — VERAPAMIL HYDROCHLORIDE 180 MG/1
180 CAPSULE, EXTENDED RELEASE ORAL DAILY
Qty: 30 CAPSULE | Refills: 5 | Status: SHIPPED | OUTPATIENT
Start: 2024-02-20 | End: 2024-02-20 | Stop reason: SDUPTHER

## 2024-02-20 NOTE — TELEPHONE ENCOUNTER
----- Message from Franki Mackey MA sent at 2/20/2024  1:48 PM CST -----  Regarding: FW: RX not able to fill  Please advise.  ----- Message -----  From: Gwyn Mosqueda  Sent: 2/20/2024   1:44 PM CST  To: Ariel Keith Staff  Subject: RX not able to fill                              Hi, pharmacy called to say they are unable to fill the medication as written and would like to spk with the office to discuss options. verapamiL (VERELAN) 180 MG C24P      [facility] - [place] - [address]  [address]  [place] [address]  Phone: [phone] Fax: [phone]  Thank you.

## 2024-02-20 NOTE — TELEPHONE ENCOUNTER
Spoke w/ pt during visit today that if any issues were to occur w/ script, pt would like to send to [facility]. As current [facility] is unable to fill, will send to [facility] per pt wishes.

## 2024-02-20 NOTE — PROGRESS NOTES
"Established Patient   SUBJECTIVE:  Patient ID: Irvin Meza   Chief Complaint: Headache    History of Present Illness:  Irvin Meza is a 54 y.o. male  with ha's, epilepsy, paroxysmal SVT, elevated BP who presents to clinic alone for follow-up of headaches.       02/20/2024 - Interval History:  Pt reports ha's have improved since last visit. He no longer experiences severe ha's that require him to sit up in bed or go to the ED. Ha's are also not daily. He has been working on trigger mgmt such as decreasing caffeine intake and eating more fruits. He did not track ha's as requested last visit. But did notice that bright lights and tv screens can trigger ha's. HA's can last up to 3 days. Due to his memory, he is unsure of his current frequency. He is sure ha's are not daily, maybe up to 3 days a week. Last HA was yesterday. Has been taking verapamil daily. " I take it every day with my food, dont miss a dose, I can remmeber that." Is unsure if he tried nurtec but does not wish to continue w/ it as "I'm already taking 6 pills" and does not wish to add to it. Takes tylenol as an abortive which helps and denies taking it daily or overusing it. But then also states he takes it "like m&ms". Upon further questioning, he denies taking it  more than 3 days in a week, more than 2 tablets at a time, more than once in a day. He also attended a PT appt and refuses to attend further as he was triggered by the perfume smells, being overheated, and walking up stairs. States he will do the stretches at home. Has been following w/ neurology for other neuro conditions which he feels is helpgin. Has not seen other referrals yet (sleep, ophtho, or psych). Also reports his bp's have remained elevated and his pcp is monitoring. Of note, he was also started on zonisamide for his seizures.   Plan: per pt wishes will increase verapamil only and rtc prn. Advised pt to limit tylenol use to avoid overuse/se's and to consider other " "abortives. Advised bp monitoring and continued close f/ups w/ pcp. Advised attending referrals to psych, ophtho, and sleep medicine. Advised continued trigger mgmt. Advised tracking.     Recommendations made at last Office Visit on 10/31/23:  - Discussed symptoms appear to be consistent with migraines, DDX includes cluster ha's, TACs, discussed treatment options and patient agreed with the following plan:  - pt to begin tracking ha's as he was unable to describe many features of ha's today, pt verbalizes that this will be challenging for him 2/2 memory, provided pt w/ printed ha diary's he can place at bedside/fridge, etc to assist. Also provided summary of plan on AVS given to pt today   - ppx - start verapamil 120mg/d for dual purpose of ha's   - abortive - start nurtec prn, d/c all otcs, maxalt, mobic  - BP - elevated today, advised pt to monitor and inform pcp of readings, ED precautions discussed, states he takes hydralazine only for bp, will start verapamil for dual purpose, avodi triptans  - referral to ophtho 2/2 pt's chronic visual complaint of not being able to see anything but shapes, states he has previously seen an eye doc for this but unable to view notes, discussed ED precuations  - referral to neuro for other neuro complaints including weakness on exam, visual complaints, memory complaints, "slurred speech"  - referral to sleep med for ?binta. Endorses loud snoring, frequent night time awakenings, and awaken w/ ha  - referral to PT 2/2 decreased neck ROM and muscle tension  - referral to psych for pt reported stress which triggers ha's  - risks, benefits, and potential side effects of verapamil, nurtec discussed   - alternative treatment options offered   - importance of healthy diet, regular exercise and sleep hygiene in the treatment of headaches    - Start tracking headaches via Migraine Yaakov naomie on phone   - RTC in 2-3 mo     Treatments Tried:  Verapamil - helps  Zonisamide 300mg nightly - " currently takes for epilepsy  Vimpat- currently takes for epilepsy  Nurtec - unsure if he tried this or if it helped  Maxalt 10mg - recalls trying this, did not help  Triptans -a void 2/2 elevated bp and h/o svt  Mobic 7.5mg bid for the last 1-2 wks - not helping per pt  Aspirin - helped in the past  Tylenol  - helped in the past    Current Medications:    Current Outpatient Medications:     lacosamide (VIMPAT) 200 mg Tab tablet, Take 1 tablet (200 mg total) by mouth 2 (two) times a day., Disp: 180 tablet, Rfl: 1    sertraline (ZOLOFT) 25 MG tablet, Take 1 tablet (25 mg total) by mouth every morning., Disp: 90 tablet, Rfl: 3    zonisamide (ZONEGRAN) 100 MG Cap, Take 3 capsules (300 mg total) by mouth nightly., Disp: 270 capsule, Rfl: 3    verapamiL (VERELAN) 180 MG C24P, Take 1 capsule (180 mg total) by mouth once daily., Disp: 30 capsule, Rfl: 5    Review of Systems - as per HPI, otherwise a balanced 10 systems review is negative.    OBJECTIVE:  Vitals:  BP (!) 137/93 (BP Location: Right arm, Patient Position: Sitting, BP Method: Medium (Automatic))   Pulse 97   Ht 6' (1.829 m)   Wt 73.9 kg (163 lb)   BMI 22.11 kg/m²      Physical Exam:  Constitutional: he appears well-developed and well-nourished. he is well groomed. NAD   HENT:    Head: Normocephalic and atraumatic  Eyes: Conjunctivae and EOM are normal  Musculoskeletal: Normal range of motion. No joint stiffness.   Skin: Skin is warm and dry.  Psychiatric: Mood and affect are normal    Neuro: Patient is alert and oriented to person, place, and time. Language is intact and fluent. Speech is clear and fluent. Recent and remote memory are intact.  Normal attention and concentration.  Facial movement is symmetric. Moves all 4 extremities against gravity. Gait and station normal.  Cranial Nerves II through XII without focal deficit.     Review of Data:   Notes from neuro reviewed   Labs:  No visits with results within 3 Month(s) from this visit.   Latest known  visit with results is:   Admission on 10/10/2023, Discharged on 10/12/2023   Component Date Value Ref Range Status    HIV 1/2 Ag/Ab 10/10/2023 Non-reactive  Non-reactive Final    Hepatitis C Ab 10/10/2023 Non-reactive  Non-reactive Final    WBC 10/10/2023 4.74  3.90 - 12.70 K/uL Final    RBC 10/10/2023 4.71  4.60 - 6.20 M/uL Final    Hemoglobin 10/10/2023 14.8  14.0 - 18.0 g/dL Final    Hematocrit 10/10/2023 45.9  40.0 - 54.0 % Final    MCV 10/10/2023 98  82 - 98 fL Final    MCH 10/10/2023 31.4 (H)  27.0 - 31.0 pg Final    MCHC 10/10/2023 32.2  32.0 - 36.0 g/dL Final    RDW 10/10/2023 12.9  11.5 - 14.5 % Final    Platelets 10/10/2023 241  150 - 450 K/uL Final    MPV 10/10/2023 9.6  9.2 - 12.9 fL Final    Immature Granulocytes 10/10/2023 0.2  0.0 - 0.5 % Final    Gran # (ANC) 10/10/2023 2.2  1.8 - 7.7 K/uL Final    Immature Grans (Abs) 10/10/2023 0.01  0.00 - 0.04 K/uL Final    Lymph # 10/10/2023 2.2  1.0 - 4.8 K/uL Final    Mono # 10/10/2023 0.3  0.3 - 1.0 K/uL Final    Eos # 10/10/2023 0.1  0.0 - 0.5 K/uL Final    Baso # 10/10/2023 0.02  0.00 - 0.20 K/uL Final    nRBC 10/10/2023 0  0 /100 WBC Final    Gran % 10/10/2023 45.5  38.0 - 73.0 % Final    Lymph % 10/10/2023 45.8  18.0 - 48.0 % Final    Mono % 10/10/2023 7.0  4.0 - 15.0 % Final    Eosinophil % 10/10/2023 1.1  0.0 - 8.0 % Final    Basophil % 10/10/2023 0.4  0.0 - 1.9 % Final    Differential Method 10/10/2023 Automated   Final    Sodium 10/10/2023 141  136 - 145 mmol/L Final    Potassium 10/10/2023 4.4  3.5 - 5.1 mmol/L Final    Chloride 10/10/2023 105  95 - 110 mmol/L Final    CO2 10/10/2023 27  23 - 29 mmol/L Final    Glucose 10/10/2023 79  70 - 110 mg/dL Final    BUN 10/10/2023 11  6 - 20 mg/dL Final    Creatinine 10/10/2023 0.9  0.5 - 1.4 mg/dL Final    Calcium 10/10/2023 10.1  8.7 - 10.5 mg/dL Final    Total Protein 10/10/2023 8.0  6.0 - 8.4 g/dL Final    Albumin 10/10/2023 4.4  3.5 - 5.2 g/dL Final    Total Bilirubin 10/10/2023 0.5  0.1 - 1.0 mg/dL  Final    Alkaline Phosphatase 10/10/2023 78  55 - 135 U/L Final    AST 10/10/2023 15  10 - 40 U/L Final    ALT 10/10/2023 10  10 - 44 U/L Final    eGFR 10/10/2023 >60.0  >60 mL/min/1.73 m^2 Final    Anion Gap 10/10/2023 9  8 - 16 mmol/L Final    Specimen UA 10/10/2023 Urine, Clean Catch   Final    Color, UA 10/10/2023 Yellow  Yellow, Straw, Elizabeth Final    Appearance, UA 10/10/2023 Clear  Clear Final    pH, UA 10/10/2023 6.0  5.0 - 8.0 Final    Specific Gravity, UA 10/10/2023 1.020  1.005 - 1.030 Final    Protein, UA 10/10/2023 Negative  Negative Final    Glucose, UA 10/10/2023 Negative  Negative Final    Ketones, UA 10/10/2023 Negative  Negative Final    Bilirubin (UA) 10/10/2023 Negative  Negative Final    Occult Blood UA 10/10/2023 Negative  Negative Final    Nitrite, UA 10/10/2023 Negative  Negative Final    Leukocytes, UA 10/10/2023 2+ (A)  Negative Final    Levetiracetam Lvl 10/10/2023 13.8  3.0 - 60.0 ug/mL Final    POCT Glucose 10/10/2023 86  70 - 110 mg/dL Final    RBC, UA 10/10/2023 1  0 - 4 /hpf Final    WBC, UA 10/10/2023 11 (H)  0 - 5 /hpf Final    Squam Epithel, UA 10/10/2023 0  /hpf Final    Microscopic Comment 10/10/2023 SEE COMMENT   Final    Urine Culture, Routine 10/10/2023 No growth   Final    Sodium 10/11/2023 141  136 - 145 mmol/L Final    Potassium 10/11/2023 4.1  3.5 - 5.1 mmol/L Final    Chloride 10/11/2023 109  95 - 110 mmol/L Final    CO2 10/11/2023 25  23 - 29 mmol/L Final    Glucose 10/11/2023 82  70 - 110 mg/dL Final    BUN 10/11/2023 11  6 - 20 mg/dL Final    Creatinine 10/11/2023 1.1  0.5 - 1.4 mg/dL Final    Calcium 10/11/2023 9.5  8.7 - 10.5 mg/dL Final    Total Protein 10/11/2023 6.8  6.0 - 8.4 g/dL Final    Albumin 10/11/2023 3.8  3.5 - 5.2 g/dL Final    Total Bilirubin 10/11/2023 0.5  0.1 - 1.0 mg/dL Final    Alkaline Phosphatase 10/11/2023 73  55 - 135 U/L Final    AST 10/11/2023 12  10 - 40 U/L Final    ALT 10/11/2023 7 (L)  10 - 44 U/L Final    eGFR 10/11/2023 >60.0  >60  mL/min/1.73 m^2 Final    Anion Gap 10/11/2023 7 (L)  8 - 16 mmol/L Final    Magnesium 10/11/2023 2.2  1.6 - 2.6 mg/dL Final    Phosphorus 10/11/2023 3.1  2.7 - 4.5 mg/dL Final    WBC 10/11/2023 4.45  3.90 - 12.70 K/uL Final    RBC 10/11/2023 4.46 (L)  4.60 - 6.20 M/uL Final    Hemoglobin 10/11/2023 14.6  14.0 - 18.0 g/dL Final    Hematocrit 10/11/2023 44.5  40.0 - 54.0 % Final    MCV 10/11/2023 100 (H)  82 - 98 fL Final    MCH 10/11/2023 32.7 (H)  27.0 - 31.0 pg Final    MCHC 10/11/2023 32.8  32.0 - 36.0 g/dL Final    RDW 10/11/2023 12.8  11.5 - 14.5 % Final    Platelets 10/11/2023 221  150 - 450 K/uL Final    MPV 10/11/2023 10.0  9.2 - 12.9 fL Final    Immature Granulocytes 10/11/2023 0.2  0.0 - 0.5 % Final    Gran # (ANC) 10/11/2023 1.5 (L)  1.8 - 7.7 K/uL Final    Immature Grans (Abs) 10/11/2023 0.01  0.00 - 0.04 K/uL Final    Lymph # 10/11/2023 2.4  1.0 - 4.8 K/uL Final    Mono # 10/11/2023 0.5  0.3 - 1.0 K/uL Final    Eos # 10/11/2023 0.1  0.0 - 0.5 K/uL Final    Baso # 10/11/2023 0.02  0.00 - 0.20 K/uL Final    nRBC 10/11/2023 0  0 /100 WBC Final    Gran % 10/11/2023 33.1 (L)  38.0 - 73.0 % Final    Lymph % 10/11/2023 53.3 (H)  18.0 - 48.0 % Final    Mono % 10/11/2023 11.0  4.0 - 15.0 % Final    Eosinophil % 10/11/2023 2.0  0.0 - 8.0 % Final    Basophil % 10/11/2023 0.4  0.0 - 1.9 % Final    Differential Method 10/11/2023 Automated   Final    Sodium 10/12/2023 138  136 - 145 mmol/L Final    Potassium 10/12/2023 4.4  3.5 - 5.1 mmol/L Final    Chloride 10/12/2023 107  95 - 110 mmol/L Final    CO2 10/12/2023 23  23 - 29 mmol/L Final    Glucose 10/12/2023 83  70 - 110 mg/dL Final    BUN 10/12/2023 15  6 - 20 mg/dL Final    Creatinine 10/12/2023 1.3  0.5 - 1.4 mg/dL Final    Calcium 10/12/2023 9.5  8.7 - 10.5 mg/dL Final    Total Protein 10/12/2023 6.9  6.0 - 8.4 g/dL Final    Albumin 10/12/2023 3.8  3.5 - 5.2 g/dL Final    Total Bilirubin 10/12/2023 0.5  0.1 - 1.0 mg/dL Final    Alkaline Phosphatase 10/12/2023 71   55 - 135 U/L Final    AST 10/12/2023 13  10 - 40 U/L Final    ALT 10/12/2023 7 (L)  10 - 44 U/L Final    eGFR 10/12/2023 >60.0  >60 mL/min/1.73 m^2 Final    Anion Gap 10/12/2023 8  8 - 16 mmol/L Final    Magnesium 10/12/2023 2.0  1.6 - 2.6 mg/dL Final    Phosphorus 10/12/2023 4.1  2.7 - 4.5 mg/dL Final    WBC 10/12/2023 4.63  3.90 - 12.70 K/uL Final    RBC 10/12/2023 4.60  4.60 - 6.20 M/uL Final    Hemoglobin 10/12/2023 14.6  14.0 - 18.0 g/dL Final    Hematocrit 10/12/2023 45.0  40.0 - 54.0 % Final    MCV 10/12/2023 98  82 - 98 fL Final    MCH 10/12/2023 31.7 (H)  27.0 - 31.0 pg Final    MCHC 10/12/2023 32.4  32.0 - 36.0 g/dL Final    RDW 10/12/2023 12.9  11.5 - 14.5 % Final    Platelets 10/12/2023 224  150 - 450 K/uL Final    MPV 10/12/2023 9.6  9.2 - 12.9 fL Final    Immature Granulocytes 10/12/2023 0.2  0.0 - 0.5 % Final    Gran # (ANC) 10/12/2023 1.2 (L)  1.8 - 7.7 K/uL Final    Immature Grans (Abs) 10/12/2023 0.01  0.00 - 0.04 K/uL Final    Lymph # 10/12/2023 2.7  1.0 - 4.8 K/uL Final    Mono # 10/12/2023 0.6  0.3 - 1.0 K/uL Final    Eos # 10/12/2023 0.1  0.0 - 0.5 K/uL Final    Baso # 10/12/2023 0.02  0.00 - 0.20 K/uL Final    nRBC 10/12/2023 0  0 /100 WBC Final    Gran % 10/12/2023 25.3 (L)  38.0 - 73.0 % Final    Lymph % 10/12/2023 59.0 (H)  18.0 - 48.0 % Final    Mono % 10/12/2023 12.7  4.0 - 15.0 % Final    Eosinophil % 10/12/2023 2.4  0.0 - 8.0 % Final    Basophil % 10/12/2023 0.4  0.0 - 1.9 % Final    Differential Method 10/12/2023 Automated   Final     Imaging:  Results for orders placed or performed during the hospital encounter of 10/10/23   CT Head Without Contrast    Narrative    EXAMINATION:  CT HEAD WITHOUT CONTRAST    CLINICAL HISTORY:  Headache, chronic, new features or increased frequency;    TECHNIQUE:  Low dose axial images were obtained through the head.  Coronal and sagittal reformations were also performed. Contrast was not administered.    COMPARISON:  CT examination of the brain without  contrast October 12, 2011    FINDINGS:  The ventricular system, sulcal pattern and parenchymal attenuation characteristics appear appropriate for age.  There is no evidence for intracranial mass, mass effect or midline shift.  There is no evidence for acute intracranial hemorrhage.  Appropriate CSF spaces are seen at the skull base.    There is appearance of extracranial soft tissue swelling suggested overlying the frontal region for which clinical and historical correlation is needed.  In addition overlying the right zygomatic arch as seen on series 3 axial image 16 there is a small oval finding within the extracranial soft tissues measuring up to approximately 11.6 mm in size, this measures approximately 8.9 Hounsfield units, this may relate to a small sebaceous cyst, clinical and historical correlation is needed.  The visualized orbits appear intact.  The mastoid air cells appear well aerated.  Mild paranasal sinus mucosal thickening is noted, suspected small mucous retention cyst of the left maxillary antrum noted.  The visualized osseous structures appear intact.      Impression    There is no evidence for acute intracranial process.    Extracranial soft tissue findings as discussed above.    Mild paranasal sinus findings.      Electronically signed by: Reyes Kline  Date:    10/11/2023  Time:    02:47     Note: I have independently reviewed any/all imaging/labs/tests and agree with the report (s) as documented.  Any discrepancies will be as noted/demarcated by free text.  LEANN MARTINEZ 2/20/2024    ASSESSMENT:  1. Nonintractable headache, unspecified chronicity pattern, unspecified headache type    2. Chronic migraine without aura without status migrainosus, not intractable    3. Generalized epilepsy, intractable    4. Elevated BP without diagnosis of hypertension    5. Chronic nonintractable headache, unspecified headache type        PLAN:  - ddx includes migraines vs cluster ha's, asked pt to track for further  "classification however pt has not produced HA diary yet :  - ppx - increase verapamil 180mg/d for dual purpose of ha's and htn  - abortive - pt defers, advised pt to limit tylenol use to avoid overuse and se's and consider alternative abortives  - BP - elevated today, advised pt to monitor and inform pcp of readings, ED precautions discussed, verapamil for dual purpose, avodi triptans  - referral to ophtho 2/2 pt's chronic visual complaint of not being able to see anything but shapes, states he has previously seen an eye doc for this but unable to view notes, discussed ED precuations  - referral to neuro for other neuro complaints including weakness on exam, visual complaints, memory complaints, "slurred speech"  - continue mgmt of epilepsy w/ epilepsy clinic w/ recent admission currenlty taking lacosamine and zonisamide  - referral to sleep med for ?binta. Endorses loud snoring, frequent night time awakenings, and awaken w/ ha  - referral to PT 2/2 decreased neck ROM and muscle tension - pt defers  - referral to psych for pt reported stress which triggers ha's  - track ha's  - Discussed goals of therapy are to decrease the frequency, intensity, and duration of headaches  - RTC prn     Orders Placed This Encounter    verapamiL (VERELAN) 180 MG C24P       Questions and concerns were sought and answered to the patient's stated verbal satisfaction.  The patient verbalizes understanding and agreement with the above stated treatment plan.     CC: No, Primary Doctor      Inna George PA-C  [facility]   [phone]    Dr. Núñez was available during today's encounter.     I spent 60 minutes on the day of this encounter preparing for, treating and managing this patient presenting with headaches.    "

## 2024-04-15 ENCOUNTER — OFFICE VISIT (OUTPATIENT)
Dept: NEUROLOGY | Facility: CLINIC | Age: 54
End: 2024-04-15
Payer: MEDICAID

## 2024-04-15 ENCOUNTER — LAB VISIT (OUTPATIENT)
Dept: LAB | Facility: HOSPITAL | Age: 54
End: 2024-04-15
Payer: MEDICAID

## 2024-04-15 VITALS
HEART RATE: 95 BPM | DIASTOLIC BLOOD PRESSURE: 106 MMHG | BODY MASS INDEX: 22.58 KG/M2 | WEIGHT: 166.69 LBS | SYSTOLIC BLOOD PRESSURE: 166 MMHG | HEIGHT: 72 IN

## 2024-04-15 DIAGNOSIS — G40.319 GENERALIZED EPILEPSY, INTRACTABLE: Primary | ICD-10-CM

## 2024-04-15 DIAGNOSIS — R51.9 NONINTRACTABLE HEADACHE, UNSPECIFIED CHRONICITY PATTERN, UNSPECIFIED HEADACHE TYPE: ICD-10-CM

## 2024-04-15 DIAGNOSIS — M62.838 NECK MUSCLE SPASM: ICD-10-CM

## 2024-04-15 DIAGNOSIS — G40.319 GENERALIZED EPILEPSY, INTRACTABLE: ICD-10-CM

## 2024-04-15 DIAGNOSIS — Z91.148 POOR COMPLIANCE WITH MEDICATION: ICD-10-CM

## 2024-04-15 DIAGNOSIS — F06.30 MOOD DISORDER DUE TO MEDICAL CONDITION: ICD-10-CM

## 2024-04-15 LAB
ALBUMIN SERPL BCP-MCNC: 4.2 G/DL (ref 3.5–5.2)
ALP SERPL-CCNC: 96 U/L (ref 55–135)
ALT SERPL W/O P-5'-P-CCNC: 10 U/L (ref 10–44)
ANION GAP SERPL CALC-SCNC: 10 MMOL/L (ref 8–16)
AST SERPL-CCNC: 15 U/L (ref 10–40)
BASOPHILS # BLD AUTO: 0.02 K/UL (ref 0–0.2)
BASOPHILS NFR BLD: 0.3 % (ref 0–1.9)
BILIRUB SERPL-MCNC: 0.4 MG/DL (ref 0.1–1)
BUN SERPL-MCNC: 11 MG/DL (ref 6–20)
CALCIUM SERPL-MCNC: 9.7 MG/DL (ref 8.7–10.5)
CHLORIDE SERPL-SCNC: 104 MMOL/L (ref 95–110)
CO2 SERPL-SCNC: 28 MMOL/L (ref 23–29)
CREAT SERPL-MCNC: 1.1 MG/DL (ref 0.5–1.4)
DIFFERENTIAL METHOD BLD: ABNORMAL
EOSINOPHIL # BLD AUTO: 0 K/UL (ref 0–0.5)
EOSINOPHIL NFR BLD: 0.7 % (ref 0–8)
ERYTHROCYTE [DISTWIDTH] IN BLOOD BY AUTOMATED COUNT: 13.1 % (ref 11.5–14.5)
EST. GFR  (NO RACE VARIABLE): >60 ML/MIN/1.73 M^2
GLUCOSE SERPL-MCNC: 89 MG/DL (ref 70–110)
HCT VFR BLD AUTO: 45.2 % (ref 40–54)
HGB BLD-MCNC: 14.4 G/DL (ref 14–18)
IMM GRANULOCYTES # BLD AUTO: 0.02 K/UL (ref 0–0.04)
IMM GRANULOCYTES NFR BLD AUTO: 0.3 % (ref 0–0.5)
LYMPHOCYTES # BLD AUTO: 1.8 K/UL (ref 1–4.8)
LYMPHOCYTES NFR BLD: 29.6 % (ref 18–48)
MCH RBC QN AUTO: 31.3 PG (ref 27–31)
MCHC RBC AUTO-ENTMCNC: 31.9 G/DL (ref 32–36)
MCV RBC AUTO: 98 FL (ref 82–98)
MONOCYTES # BLD AUTO: 0.5 K/UL (ref 0.3–1)
MONOCYTES NFR BLD: 8.4 % (ref 4–15)
NEUTROPHILS # BLD AUTO: 3.6 K/UL (ref 1.8–7.7)
NEUTROPHILS NFR BLD: 60.7 % (ref 38–73)
NRBC BLD-RTO: 0 /100 WBC
PLATELET # BLD AUTO: 235 K/UL (ref 150–450)
PMV BLD AUTO: 9.7 FL (ref 9.2–12.9)
POTASSIUM SERPL-SCNC: 3.4 MMOL/L (ref 3.5–5.1)
PROT SERPL-MCNC: 7.7 G/DL (ref 6–8.4)
RBC # BLD AUTO: 4.6 M/UL (ref 4.6–6.2)
SODIUM SERPL-SCNC: 142 MMOL/L (ref 136–145)
WBC # BLD AUTO: 5.97 K/UL (ref 3.9–12.7)

## 2024-04-15 PROCEDURE — 80203 DRUG SCREEN QUANT ZONISAMIDE: CPT

## 2024-04-15 PROCEDURE — 3077F SYST BP >= 140 MM HG: CPT | Mod: CPTII,,,

## 2024-04-15 PROCEDURE — 1159F MED LIST DOCD IN RCRD: CPT | Mod: CPTII,,,

## 2024-04-15 PROCEDURE — 99999 PR PBB SHADOW E&M-EST. PATIENT-LVL III: CPT | Mod: PBBFAC,,,

## 2024-04-15 PROCEDURE — 3080F DIAST BP >= 90 MM HG: CPT | Mod: CPTII,,,

## 2024-04-15 PROCEDURE — 99213 OFFICE O/P EST LOW 20 MIN: CPT | Mod: PBBFAC

## 2024-04-15 PROCEDURE — 85025 COMPLETE CBC W/AUTO DIFF WBC: CPT

## 2024-04-15 PROCEDURE — 80235 DRUG ASSAY LACOSAMIDE: CPT

## 2024-04-15 PROCEDURE — 99215 OFFICE O/P EST HI 40 MIN: CPT | Mod: S$PBB,,,

## 2024-04-15 PROCEDURE — 3008F BODY MASS INDEX DOCD: CPT | Mod: CPTII,,,

## 2024-04-15 PROCEDURE — 80053 COMPREHEN METABOLIC PANEL: CPT

## 2024-04-15 PROCEDURE — 36415 COLL VENOUS BLD VENIPUNCTURE: CPT

## 2024-04-15 RX ORDER — ZONISAMIDE 100 MG/1
200 CAPSULE ORAL 2 TIMES DAILY
Qty: 360 CAPSULE | Refills: 3 | Status: SHIPPED | OUTPATIENT
Start: 2024-04-15 | End: 2025-04-15

## 2024-04-15 RX ORDER — TIZANIDINE 2 MG/1
2 TABLET ORAL NIGHTLY PRN
Qty: 30 TABLET | Refills: 5 | Status: SHIPPED | OUTPATIENT
Start: 2024-04-15 | End: 2024-10-12

## 2024-04-15 RX ORDER — SERTRALINE HYDROCHLORIDE 50 MG/1
50 TABLET, FILM COATED ORAL DAILY
Qty: 90 TABLET | Refills: 3 | Status: SHIPPED | OUTPATIENT
Start: 2024-04-15 | End: 2025-04-15

## 2024-04-15 NOTE — PROGRESS NOTES
"Name: Irvin Meza  MRN:[id number]   CSN: [id number]  Date of service: 4/15/2024  Age:54 y.o.   Gender:male   Referring Physician/Service: No referring provider defined for this encounter.   The patient is here today with: self    Neurology Clinic:  Follow-up Visit    CHIEF COMPLAINT:  Epilepsy    Interval Events/ROS 4/15/2024:    Current ASM/SEs: lacosamide 200mg BID and zonisamide 300mg qhs; no SE  Breakthrough seizures/events: no GTCs, "black out spells" w/ confusion and +UI still occurring around 1-2x/week  Driving: no  Sleep: better  Mood: improved, no longer having suicidal thoughts, however still feels upset/irritable majority of the time, constant "chip on his shoulder", does not like having to depend on others for things, feels like a burden to family members, taking sertraline 25mg qd     Saw PCP last month. Intermittent headaches. Otherwise, no fever, no cold symptoms, no changes in vision, no new weakness, no chest pain, no shortness of breath, no nausea, no vomiting, no diarrhea, no constipation, no tingling/numbness, no problems walking.    Recent Labs   Lab 07/11/21  1627 10/07/21  1645 02/11/22  1619 05/31/22  1125 10/10/23  1749   Levetiracetam Lvl <1.0 13.2 36.8 21.7 13.8   Zonisamide  --   --   --  2.6 L  --        Interval Events/ROS 12/11/2023:    Current ASM/SEs: lacosamide 150mg BID and zonisamide 100mg qhs (prescribed 200mg); no SE; transitioned from LEV to lacosamide during EMU admission (ineffective + mood SE)  Breakthrough seizures/events: no GTCs since EMU 10/11/2023 which is a major improvement, though does experience smaller events consisting of odd behavior/yelling/decreased responsiveness that still occur weekly; last event of this type was yesterday 12/10/23  Driving: no  Sleep: fair, some nights good, some nights stays up thinking a lot   Mood: up and down    Otherwise, no fever, no cold symptoms, no current headache, no changes in vision, no new weakness, no chest pain, no shortness " of breath, no nausea, no vomiting, no diarrhea, no constipation, no tingling/numbness, no problems walking. May go live with one of his daughters out of state for a few months.     EMU 10/11/2023-10/12/2023  Currently maintained on Levetiracetam 1500 mg TID and Zonisamide 300 mg nightly with continued uncontrolled seizures and reports of significant mood effects of anger and depression.  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 Loaded with Lacosamide 400 mg x1 10/12, patient to continue Lacosamide 150 mg BID maintenance dose in addition to Zonisamide 300 mg nightly. Discontinue Levetiracetam given mood side effects and continued seizures when taking. Outpatient follow up in Neurology Headache clinic for headache management, follow up as scheduled in Neurology Epilepsy clinic for further management. Discharged home in stable condition 10/12.      Interval Events/ROS 10/10/2023:    Current ASM/SEs: levetiracetam 1500mg TID (6am, 12pm, 10pm) and zonisamide 300mg nightly (10pm); SE mood issues   Breakthrough seizures/events: uncontrolled convulsions, continues to experience multiple GTCs weekly despite compliance with medications  Driving: no  Sleep: not well because of headaches that wake him up, reports he was recently dx w/ cluster headaches (intense pain to L side of head w/ unilateral tearing + rhinorrhea, occurring near-daily for the past few months)   Mood: depressed, apathetic towards living    Otherwise, no fever, no changes in vision, no new weakness, no chest pain, no shortness of breath, no nausea, no vomiting, no diarrhea, no constipation, no tingling/numbness, no problems walking.    Interval Events/ROS 8/4/2023:    Current ASM/SEs: levetiracetam 1500mg TID, does not recall taking zonisamide;  states mom and daughter help remind him to take his meds; mood SE  Breakthrough seizures/events: brief 'black out' spells daily; last convulsive event was 7/10/2023 that occurred in the setting of missed medication per EMR -> [facility] at [facility]  Driving: no  Sleep: not good  Mood: depressed, passive suicidal ideation, not taking sertraline due to GI side effects     Headaches. Otherwise, no fever, no cold symptoms, no changes in vision, no new weakness, no chest pain, no shortness of breath, no nausea, no vomiting, no diarrhea, no constipation, no tingling/numbness, no problems walking.     Interval Events/ROS 8/31/2022:    Current ASM/SEs: patient is taking levetiracetam 500mg TID and zonisamide 100mg BID; no reported side effects    Breakthrough seizures/events: generalized convulsion 07/03/2022, prior to that 04/2022; blackout/dyscognitive episodes 3-4 in the past two months  Driving: no  Folic acid: no  Sleep: sleeps well if head is not hurting   Mood: okay    Frequent headaches 3-4 days per week for the past 2 months described as a throbbing pain to L side of head and behind L eye. Associated photophobia. Denies N/V. Relieved w/ excedrin migraine. Otherwise, no fever, no cold symptoms, no changes in vision, no new weakness, no chest pain, no shortness of breath, no nausea, no vomiting, no diarrhea, no constipation, no tingling/numbness, no problems walking.     Interval Events/ROS 5/31/2022:    Breakthrough event 04/03/2022 despite levetiracetam 1500mg BID and zonisamide 100mg BID (originally prescribed 300mg qhs but patient states he tries not to take a lot of medication). No missed doses. No reported side effects. No identifiable trigger of recent breakthrough event though does mention he is not sleeping well at night, not sure why. Not working. Trying to work with  regarding disability. Denies feeling depressed though mentions he does get angry a lot. No SI or HI. Otherwise, no fever, no cold  "symptoms, no headache, no changes in vision, no new weakness, no chest pain, no shortness of breath, no nausea, no vomiting, no diarrhea, no constipation, no tingling/numbness, no problems walking.    Interval Events/ROS 2/11/2022:  Seizure 1/18/2022, trying to get out. Black out. This year 1 seizure. Keppra is okay. 5-6 seizure over the whole year in 2021 despite compliance. Taking keppra. Caught a seizure in his sleep.  Head laceration.  Now a helmet has been ordered to protect him during sleep.  Covid vaccination and boosted. Trying to get bone density test. Pain especially in the back. Mood okay. Not working. Only way to get job is if he lied. Trying to getting disability. Letter written. Needs to get a . Close up vision is blurry. No eye doctor. Vimpat twice daily but he did not have any refills on this medications so did not continue it.  Lacosamide caused poor appetite. Otherwise, no fever, no cold symptoms, no new weakness, no chest pain, no shortness of breath, no nausea, no vomiting, no diarrhea, no constipation, no tingling/numbness, no problems walking, no mood issues.    HPI 10/7/2021:     Age of first seizure: born with seizures, seizures as an infant   Seizure Risk Factors: no family history of seizures, term, unclear about details of birth but does not think he stayed in the hospital long, no CNS infections, several head strikes with seizures only   Time of Last Seizure: week ago   # of lifetime Seizures: 1000+  Frequency of Seizures:  At most 2 in a day, 1-2 seizures per month  Seizure Triggers:  Overheating, strong smells like perfume and gasoline, lights and too much TV, etoh  Injuries/Hospitalization for seizures? Head strike, tongue bite, fractured arms, lost teeth (had to get teeth pulled)  Bone Health: Dexa at [facility] but does not know results      Auras:  Headache and "weird feeling" -> space out, will start stuttering     Seizure Events:   1. Generalized convusions, Banging head back, eye " roll back, does not want anyone to touch him, will throw people around, urinary incontinence, tired afterwards, couple days to get back to normal, sore afterwards with a persistent headache   2. Black out, wander off, walk across the stress, act funny, can be combative, does not want to be touched, very tired    Current AED/SEs:  1. Levetiracetam 1000 mg twice daily SE still having seizures but frequently misses doses    Previous AED/SEs or reason for DC.   1. Phenytoin SE dental issues     EEG: [facility] -> 3-4 Hz discharges per care everywhere tab    CT 2011: normal     Other Allergies: none      AED compliance, adherence: misses some doses     ROS 10/7/2021:  Vaccination. Problems with taste after seizures. Otherwise, denies headache at this moment, loss of vision, blurred vision, diplopia, dysarthria, dysphagia, lightheadedness, vertigo, tinnitus or hearing difficulty. Denies difficulties producing or comprehending speech.  Denies focal weakness, numbness, paresthesias. Denies difficulty with gait. Denies cough, shortness of breath.  Denies chest pain or tightness, palpitations.  Denies nausea, vomiting, diarrhea, constipation or abdominal pain.  No bowel or bladder incontinence or retention.  No falls.       EXAM:   - Vitals: BP (!) 166/106 (BP Location: Right arm, Patient Position: Sitting)   Pulse 95   Ht 6' (1.829 m)   Wt 75.6 kg (166 lb 10.7 oz)   BMI 22.60 kg/m²    - General: Awake, cooperative, NAD.   - HEENT: NC/AT, edentulous  - Neck: muscle spasms   - Pulmonary: no increased WOB  - Cardiac: well perfused   - Abdomen: soft, nontender, nondistended  - Extremities: no edema  - Skin: no rashes or lesions noted.     NEURO EXAM:   - Mental Status: Awake, alert, oriented x 3. Able to relate history, some difficulty with details. Attentive to examiner. Language is fluent with intact repetition and comprehension. Normal prosody. There were no paraphasic errors. Able to name both high and low  frequency objects. Speech was mildly dysarthric (edentulous). Able to follow both midline and appendicular commands. There was no evidence of apraxia or neglect.    - Cranial Nerves:  VFF to confrontation. EOMI. No facial droop. Hearing intact to room voice. 5/5 strength in trapezii and SCM bilaterally.     - Motor: Normal bulk and tone throughout. No pronator drift bilaterally. No adventitious movements such as tremor or asterixis noted.     Delt Bic Tri WrE WrF  FFl FE IO IP Quad Ham TA Gastroc   R   5     5    5    5    5        5   5    5   5    5        5     5      5            L   5      5    5   5    5        5    5   5    5    5       5     5      5              - Sensory: No deficits to light touch. No extinction to DSS.  - Coordination: No dysmetria on FNF   - Gait: Good initiation. Narrow-based, normal stride and arm swing. Romberg negative.    PLAN: 54-year-old man with intractable generalized epilepsy (3-4 hz generalized discharges on EEG) with frequent generalized convulsions and dyscognitive episodes with bizarre behaviors. Convulsions are currently controlled on lacosamide 200mg BID and zonisamide 300mg qhs, however, still w/ weekly dyscognitive episodes. Levels today. Increase zonisamide to 200mg BID. Continue lacosamide at same dose. Sertraline to 50mg qd for mood. Continue to follow w/ headache team + trial of tizanidine 2mg PRN qhs to help w/ muscle spasms/cervicalgia component of headaches. Social work referral for additional support/disability resources. Patient is comfortable with plan. All questions and concerns are addressed at this time. Follow up in about 3 months (around 7/15/2024).     Patient Instructions   You came to Epilepsy Clinic because of your seizure disorder.      Here is the plan we discussed today:  - increase sertraline to 50mg (1 tablet) in the morning   - increase zonisamide to 2 capsules in the morning and 2 capsules at night   - continue lacosamide 1 tablet in the  morning and 1 tablet at night   - please get a lab test to check out the blood level of medication  - take tizanidine (muscle relaxer) 1 tablet nightly as needed for headaches   - I will ask our  Yosvany to reach out to you     Do not miss any doses of medication. If a dose of medication is missed, take it as soon as it is remembered even if that means doubling up on the dose. Get regular sleep. Go to sleep at the same time and wake up at the same time every day. People with epilepsy require more sleep than people without epilepsy.  Sleeping 10-12 hours a day can be normal for a person with epilepsy.  Every seizure makes it harder to prevent the next seizure. Epilepsy is associated with SUDEP, or sudden unexpected death in epilepsy.  The risk is significantly higher if convulsive seizures are not well controlled. For more information, check out these websites: https://www.epilepsy.com/, https://www.epilepsyalliance[place].org/, www.[place]-epilepsy.org, www.womenandepilepsy.org.  If you are interested in meeting other individuals in our epilepsy community, please reach out to the [facility] [place] ([phone], [phone], [email or web address]).  They organize many informative and fun activities in the region.  They can provide you invaluable information on how to get access to resources available for patients living with epilepsy as well as a rich community of like-minded individuals who are all learning to cope with the same issues.  It is very important to remember, you are not alone.     Per [place] law, no episodes of loss of consciousness for 6 months before driving.  Avoid dangerous situations.  For example, no baths/pools alone, no heights, no power tools.  Wear a bike helmet.  If breakthrough seizures occur that are different in character, frequency, or duration from normal episodes, please patient portal me or call the office and we will decide the next steps. If multiple  seizures occur in a row without return back to baseline, 911 needs to be called.     Return to clinic in 3 months or sooner with issues.  Please patient portal with any questions or concerns.    Maria R Blankenship PA-C   Neurology-Epilepsy  [facility]    Forms/Letters/Disability/DMV Paperwork: We understand the importance of filling out forms and providing letters in a timely manner.  However, many of these forms have very tricky language and once an official form is submitted as part of the medical record, it can not be modified or erased.  Please work with us in order to get these forms filled out in the most complete, accurate, and efficient way. 1.  Once you are aware that a form will need to be completed, please make an appointment.  A virtual appointment with Dr. Hui or Jesica is perfectly fine. 2.  Please fill out the form as much as you can.  There are many questions that we do not have an answer for.  Please bring a blank copy of the form and your partially filled out form to the clinic visit or send them to us over the portal.  We will complete the form together with you during the clinic visit, sign it, and either return it to you or send it to the correct destination.  Every form will require an appointment however we can fill out multiple forms at once if needed.  Please do not hesitate to reach out with any questions or concerns about this policy.  We are trying to make sure that we have a system in place to meet this need which works for everyone involved.  Thank you for your understanding.       Problem List Items Addressed This Visit          Neuro    Generalized epilepsy, intractable - Primary    Relevant Medications    zonisamide (ZONEGRAN) 100 MG Cap    Other Relevant Orders    Lacosamide (Vimpat) (Completed)    Zonisamide level (Completed)    CBC auto differential (Completed)    Comprehensive metabolic panel (Completed)    Vitamin D    Ambulatory referral/consult to Social  Work    Headache    Relevant Medications    tiZANidine (ZANAFLEX) 2 MG tablet       Palliative Care    Poor compliance with medication     Other Visit Diagnoses       Neck muscle spasm        Relevant Medications    tiZANidine (ZANAFLEX) 2 MG tablet    Mood disorder due to medical condition        Relevant Medications    sertraline (ZOLOFT) 50 MG tablet          Disclaimer: This note has been generated using voice-recognition software. There may be typographical errors that were missed during proof-reading.     LABS:  Recent Labs   Lab 02/11/22  1619 04/03/22  1518 09/04/22 2006 05/21/23  1650 10/10/23  1749 10/11/23  0349 10/12/23  0319   WBC 5.85  --   --   --    < > 4.45 4.63   Hemoglobin 14.9  --   --   --    < > 14.6 14.6   Hematocrit 45.2  --   --   --    < > 44.5 45.0   Platelets 231  --   --   --    < > 221 224   Sodium 141   < > 138 140   < > 141 138   Potassium 4.4   < > 4.0 3.8   < > 4.1 4.4   BUN 12   < > 13.2 9.4   < > 11 15   Creatinine 1.0   < > 1.12 1.14   < > 1.1 1.3   eGFR if  >60.0  --   --   --   --   --   --    eGFR   --    < > 88 L 78 L  --   --   --    eGFR if non  >60.0  --   --   --   --   --   --     < > = values in this interval not displayed.       Recent Labs   Lab 07/11/21  1627 10/07/21  1645 02/11/22  1619 05/31/22  1125 10/10/23  1749   Levetiracetam Lvl <1.0 13.2 36.8 21.7 13.8   Zonisamide  --   --   --  2.6 L  --    Phenytoin Lvl <0.1 L  --   --   --   --         IMAGING:  Recent imaging is personally reviewed with the patient.    None in the system.    PAST MEDICAL HISTORY:   Active Ambulatory Problems     Diagnosis Date Noted    Generalized epilepsy, intractable 10/07/2021    Poor compliance with medication 10/07/2021    Headache 10/10/2023    Intermittent chest pain 10/11/2023    Paroxysmal SVT (supraventricular tachycardia) 10/11/2023    Cluster headache, not intractable 09/12/2023    Decreased ROM of neck 11/02/2023    Muscle  tone increased 11/02/2023    Decreased strength 11/02/2023     Resolved Ambulatory Problems     Diagnosis Date Noted    No Resolved Ambulatory Problems     Past Medical History:   Diagnosis Date    Seizures         PAST SURGICAL HISTORY: No past surgical history on file.     ALLERGIES: Patient has no known allergies.   CURRENT MEDICATIONS:   Current Outpatient Medications   Medication Sig Dispense Refill    lacosamide (VIMPAT) 200 mg Tab tablet Take 1 tablet (200 mg total) by mouth 2 (two) times a day. 180 tablet 1    sertraline (ZOLOFT) 25 MG tablet Take 1 tablet (25 mg total) by mouth every morning. 90 tablet 3    verapamiL (VERELAN) 180 MG C24P Take 1 capsule (180 mg total) by mouth once daily. 30 capsule 5    zonisamide (ZONEGRAN) 100 MG Cap Take 3 capsules (300 mg total) by mouth nightly. 270 capsule 3     No current facility-administered medications for this visit.        FAMILY HISTORY: No family history on file.      SOCIAL HISTORY:   Social History     Socioeconomic History    Marital status: Single   Tobacco Use    Smoking status: Every Day     Current packs/day: 1.00     Types: Cigarettes    Smokeless tobacco: Never   Substance and Sexual Activity    Alcohol use: Yes     Comment: every now and then    Drug use: No      Questions and concerns raised by the patient and family/care-giver(s) were addressed and they indicated understanding of everything discussed and agreed to plans as above.    Maria R Blankenship PA-C   Neurology-Epilepsy  [facility]    Collaborating physician, Dr. Becky Hui, was available during today's encounter.     I spent approximately 42 minutes on the day of this encounter preparing to see the patient, obtaining and reviewing history and results, performing a medically appropriate exam, counseling and educating the patient/family/caregiver, documenting clinical information, coordinating care, and ordering medications, tests, procedures, and referrals.

## 2024-04-15 NOTE — PATIENT INSTRUCTIONS
You came to Epilepsy Clinic because of your seizure disorder.      Here is the plan we discussed today:  - increase sertraline to 50mg (1 tablet) in the morning   - increase zonisamide to 2 capsules in the morning and 2 capsules at night   - continue lacosamide 1 tablet in the morning and 1 tablet at night   - please get a lab test to check out the blood level of medication  - take tizanidine (muscle relaxer) 1 tablet nightly as needed for headaches   - I will ask our  Yosvany to reach out to you     Do not miss any doses of medication. If a dose of medication is missed, take it as soon as it is remembered even if that means doubling up on the dose. Get regular sleep. Go to sleep at the same time and wake up at the same time every day. People with epilepsy require more sleep than people without epilepsy.  Sleeping 10-12 hours a day can be normal for a person with epilepsy.  Every seizure makes it harder to prevent the next seizure. Epilepsy is associated with SUDEP, or sudden unexpected death in epilepsy.  The risk is significantly higher if convulsive seizures are not well controlled. For more information, check out these websites: https://www.epilepsy.com/, https://www.epilepsyalliance[place].org/, www.[place]-epilepsy.org, www.womenandepilepsy.org.  If you are interested in meeting other individuals in our epilepsy community, please reach out to the [facility] [place] ([phone], [phone], [email or web address]).  They organize many informative and fun activities in the region.  They can provide you invaluable information on how to get access to resources available for patients living with epilepsy as well as a rich community of like-minded individuals who are all learning to cope with the same issues.  It is very important to remember, you are not alone.     Per [place] law, no episodes of loss of consciousness for 6 months before driving.  Avoid dangerous situations.  For example,  no baths/pools alone, no heights, no power tools.  Wear a bike helmet.  If breakthrough seizures occur that are different in character, frequency, or duration from normal episodes, please patient portal me or call the office and we will decide the next steps. If multiple seizures occur in a row without return back to baseline, 911 needs to be called.     Return to clinic in 3 months or sooner with issues.  Please patient portal with any questions or concerns.    Maria R Blankenship PA-C   Neurology-Epilepsy  [facility]    Forms/Letters/Disability/DMV Paperwork: We understand the importance of filling out forms and providing letters in a timely manner.  However, many of these forms have very tricky language and once an official form is submitted as part of the medical record, it can not be modified or erased.  Please work with us in order to get these forms filled out in the most complete, accurate, and efficient way. 1.  Once you are aware that a form will need to be completed, please make an appointment.  A virtual appointment with Dr. Hui or Jesica is perfectly fine. 2.  Please fill out the form as much as you can.  There are many questions that we do not have an answer for.  Please bring a blank copy of the form and your partially filled out form to the clinic visit or send them to us over the portal.  We will complete the form together with you during the clinic visit, sign it, and either return it to you or send it to the correct destination.  Every form will require an appointment however we can fill out multiple forms at once if needed.  Please do not hesitate to reach out with any questions or concerns about this policy.  We are trying to make sure that we have a system in place to meet this need which works for everyone involved.  Thank you for your understanding.

## 2024-04-16 LAB — ZONISAMIDE SERPL-MCNC: <1 MCG/ML (ref 10–40)

## 2024-04-17 LAB — LACOSAMIDE: 12.8 MCG/ML (ref 1–10)

## 2024-06-03 ENCOUNTER — PATIENT MESSAGE (OUTPATIENT)
Dept: NEUROLOGY | Facility: CLINIC | Age: 54
End: 2024-06-03
Payer: MEDICAID

## 2024-06-08 ENCOUNTER — HOSPITAL ENCOUNTER (EMERGENCY)
Facility: HOSPITAL | Age: 54
Discharge: HOME | End: 2024-06-09
Attending: EMERGENCY MEDICINE
Payer: MEDICAID

## 2024-06-08 DIAGNOSIS — R56.9 SEIZURE: Primary | ICD-10-CM

## 2024-06-08 LAB
ALBUMIN SERPL BCP-MCNC: 3.9 G/DL (ref 3.5–5.2)
ALP SERPL-CCNC: 90 U/L (ref 55–135)
ALT SERPL W/O P-5'-P-CCNC: 10 U/L (ref 10–44)
ANION GAP SERPL CALC-SCNC: 10 MMOL/L (ref 8–16)
AST SERPL-CCNC: 16 U/L (ref 10–40)
BACTERIA #/AREA URNS AUTO: NORMAL /HPF
BASOPHILS # BLD AUTO: 0.03 K/UL (ref 0–0.2)
BASOPHILS NFR BLD: 0.4 % (ref 0–1.9)
BILIRUB SERPL-MCNC: 0.2 MG/DL (ref 0.1–1)
BILIRUB UR QL STRIP: NEGATIVE
BUN SERPL-MCNC: 9 MG/DL (ref 6–20)
CALCIUM SERPL-MCNC: 9.6 MG/DL (ref 8.7–10.5)
CHLORIDE SERPL-SCNC: 110 MMOL/L (ref 95–110)
CLARITY UR REFRACT.AUTO: CLEAR
CO2 SERPL-SCNC: 23 MMOL/L (ref 23–29)
COLOR UR AUTO: COLORLESS
CREAT SERPL-MCNC: 1.3 MG/DL (ref 0.5–1.4)
DIFFERENTIAL METHOD BLD: ABNORMAL
EOSINOPHIL # BLD AUTO: 0.1 K/UL (ref 0–0.5)
EOSINOPHIL NFR BLD: 1.1 % (ref 0–8)
ERYTHROCYTE [DISTWIDTH] IN BLOOD BY AUTOMATED COUNT: 13.3 % (ref 11.5–14.5)
EST. GFR  (NO RACE VARIABLE): >60 ML/MIN/1.73 M^2
GLUCOSE SERPL-MCNC: 101 MG/DL (ref 70–110)
GLUCOSE UR QL STRIP: NEGATIVE
HCT VFR BLD AUTO: 42.1 % (ref 40–54)
HGB BLD-MCNC: 13.5 G/DL (ref 14–18)
HGB UR QL STRIP: NEGATIVE
IMM GRANULOCYTES # BLD AUTO: 0.02 K/UL (ref 0–0.04)
IMM GRANULOCYTES NFR BLD AUTO: 0.3 % (ref 0–0.5)
KETONES UR QL STRIP: NEGATIVE
LEUKOCYTE ESTERASE UR QL STRIP: ABNORMAL
LYMPHOCYTES # BLD AUTO: 3.7 K/UL (ref 1–4.8)
LYMPHOCYTES NFR BLD: 50.2 % (ref 18–48)
MAGNESIUM SERPL-MCNC: 2 MG/DL (ref 1.6–2.6)
MCH RBC QN AUTO: 31 PG (ref 27–31)
MCHC RBC AUTO-ENTMCNC: 32.1 G/DL (ref 32–36)
MCV RBC AUTO: 97 FL (ref 82–98)
MICROSCOPIC COMMENT: NORMAL
MONOCYTES # BLD AUTO: 0.6 K/UL (ref 0.3–1)
MONOCYTES NFR BLD: 8.4 % (ref 4–15)
NEUTROPHILS # BLD AUTO: 2.9 K/UL (ref 1.8–7.7)
NEUTROPHILS NFR BLD: 39.6 % (ref 38–73)
NITRITE UR QL STRIP: NEGATIVE
NRBC BLD-RTO: 0 /100 WBC
PH UR STRIP: 7 [PH] (ref 5–8)
PLATELET # BLD AUTO: 219 K/UL (ref 150–450)
PMV BLD AUTO: 9.7 FL (ref 9.2–12.9)
POTASSIUM SERPL-SCNC: 4.5 MMOL/L (ref 3.5–5.1)
PROT SERPL-MCNC: 7.3 G/DL (ref 6–8.4)
PROT UR QL STRIP: NEGATIVE
RBC # BLD AUTO: 4.36 M/UL (ref 4.6–6.2)
RBC #/AREA URNS AUTO: 1 /HPF (ref 0–4)
SODIUM SERPL-SCNC: 143 MMOL/L (ref 136–145)
SP GR UR STRIP: 1 (ref 1–1.03)
SQUAMOUS #/AREA URNS AUTO: 0 /HPF
URN SPEC COLLECT METH UR: ABNORMAL
WBC # BLD AUTO: 7.39 K/UL (ref 3.9–12.7)
WBC #/AREA URNS AUTO: 3 /HPF (ref 0–5)

## 2024-06-08 PROCEDURE — 99285 EMERGENCY DEPT VISIT HI MDM: CPT | Mod: 25

## 2024-06-08 PROCEDURE — 93010 ELECTROCARDIOGRAM REPORT: CPT | Mod: ,,, | Performed by: INTERNAL MEDICINE

## 2024-06-08 PROCEDURE — 93005 ELECTROCARDIOGRAM TRACING: CPT

## 2024-06-08 PROCEDURE — 80053 COMPREHEN METABOLIC PANEL: CPT | Performed by: EMERGENCY MEDICINE

## 2024-06-08 PROCEDURE — 83735 ASSAY OF MAGNESIUM: CPT | Performed by: EMERGENCY MEDICINE

## 2024-06-08 PROCEDURE — 80203 DRUG SCREEN QUANT ZONISAMIDE: CPT | Performed by: EMERGENCY MEDICINE

## 2024-06-08 PROCEDURE — 81001 URINALYSIS AUTO W/SCOPE: CPT | Performed by: EMERGENCY MEDICINE

## 2024-06-08 PROCEDURE — 80235 DRUG ASSAY LACOSAMIDE: CPT | Performed by: EMERGENCY MEDICINE

## 2024-06-08 PROCEDURE — 85025 COMPLETE CBC W/AUTO DIFF WBC: CPT | Performed by: EMERGENCY MEDICINE

## 2024-06-09 VITALS
RESPIRATION RATE: 18 BRPM | SYSTOLIC BLOOD PRESSURE: 153 MMHG | OXYGEN SATURATION: 98 % | HEART RATE: 81 BPM | TEMPERATURE: 98 F | DIASTOLIC BLOOD PRESSURE: 99 MMHG

## 2024-06-09 LAB
OHS QRS DURATION: 80 MS
OHS QTC CALCULATION: 424 MS

## 2024-06-09 NOTE — ED NOTES
"Irvin Meza, a 54 y.o. male presents to the ED w/ complaint of "12 light seizures today." States it started yesterday, reports has seizures daily. Reports that he stares off into space and cannot recall what happened afterwards. Reports only complaint is feeling drained and tired at this time. Hx of epilepsy, on lacosamide and zonisamide    Triage note:  Chief Complaint   Patient presents with    Seizures     Pt and family reporting episodes of Grand Mal seizures starting this am around 9am. Per daughter, "he goes in an out of seizures, blacks out and just stares into space." Pt reports compliance with meds.     Review of patient's allergies indicates:  No Known Allergies  Past Medical History:   Diagnosis Date    Paroxysmal SVT (supraventricular tachycardia) 10/11/2023    Seizures    Patient identifiers for Irvin Meza checked and correct.    LOC: The patient is awake, alert and aware of environment with an appropriate affect, the patient is oriented x 4 and speaking appropriately.  APPEARANCE: Patient resting comfortably and in no acute distress, patient is clean and well groomed, patient's clothing is properly fastened.  SKIN: The skin is warm and dry, color consistent with ethnicity, patient has normal skin turgor and moist mucus membranes, skin intact, no breakdown or bruising noted.  MUSCULOSKELETAL: Patient moving all extremities well, no obvious swelling or deformities noted.  RESPIRATORY: Airway is open and patent, respirations are spontaneous and even, patient has a normal effort and rate.  CARDIAC: Patient has a normal rate and rhythm, no periphreal edema noted, capillary refill < 3 seconds. Normal +2 pedal pulses present.  ABDOMEN: Soft and non tender to palpation, no distention noted. Patient denies any nausea, vomiting, diarrhea, or constipation.   NEUROLOGIC: Eyes open spontaneously, PERRL, behavior appropriate to situation, follows commands, facial expression symmetrical, bilateral hand grasp " equal and even, purposeful motor response noted, normal sensation in all extremities. +fatigue

## 2024-06-09 NOTE — DISCHARGE INSTRUCTIONS
Please call your neurologist on Monday to discuss if you need any changes here medications.  If you have increased frequency of seizures or grand mal seizures, please return to the emergency department as you may need admission for further workup.

## 2024-06-09 NOTE — PROGRESS NOTES
Progress Note    Received patient at signout.    55 y/o M PMH epilepsy pending UA and CT.    -UA: no acute findings  -CT: no acute pathology  -Patient reassessed bedside, he feels well, no complaints. No further seizures here, and he does not want to stay, he is back to baseline. Will DC, he will call his neurologist first thing on Monday, strict RTER precautions given, DC home.

## 2024-06-09 NOTE — ED PROVIDER NOTES
"Encounter Date: 6/8/2024       History     Chief Complaint   Patient presents with    Seizures     Pt and family reporting episodes of Grand Mal seizures starting this am around 9am. Per daughter, "he goes in an out of seizures, blacks out and just stares into space." Pt reports compliance with meds.     HPI  54-year-old male with a history of SVT and generalized epilepsy who presents with multiple seizures today.  History provided by the patient and his daughter.  They report that he typically has staring spells.  Today he had about 12 episodes of this.  He has been every day but when he was more than 7 he comes to the emergency department.  He also states I was worried he was going to have a a big one, meaning a grand mal seizure.  He did not have any grand mal seizures today.  He denies any injuries or headaches other than his baseline migraines.  No new vision changes.  No speech changes.  No weakness or numbness unilaterally.  Denies new chest pain, shortness of breath, coughing, abdominal pain, vomiting or diarrhea.  No fevers or chills.  No urinary symptoms.  He has been medication compliant.      Last seen in clinic by Neurology on 04/15.  Was having increased generalized convulsions and dyscognitive episodes with bizarre behaviors at that time.  They increased his sertraline, increased his zonisamide, and continued his lacosamide.      Review of patient's allergies indicates:  No Known Allergies  Past Medical History:   Diagnosis Date    Paroxysmal SVT (supraventricular tachycardia) 10/11/2023    Seizures      History reviewed. No pertinent surgical history.  No family history on file.  Social History     Tobacco Use    Smoking status: Every Day     Current packs/day: 1.00     Types: Cigarettes    Smokeless tobacco: Never   Substance Use Topics    Alcohol use: Yes     Comment: every now and then    Drug use: No     Review of Systems    Physical Exam     Initial Vitals [06/08/24 1947]   BP Pulse Resp Temp " SpO2   (!) 177/114 97 18 98.4 °F (36.9 °C) 98 %      MAP       --         Physical Exam    Nursing note and vitals reviewed.  Constitutional: He appears well-developed and well-nourished. No distress.   HENT:   Head: Normocephalic and atraumatic.   Nose: Nose normal.   Eyes: Conjunctivae and EOM are normal. Pupils are equal, round, and reactive to light.   Neck:   Normal range of motion.  Cardiovascular:  Normal rate, regular rhythm, normal heart sounds and intact distal pulses.     Exam reveals no gallop and no friction rub.       No murmur heard.  Pulmonary/Chest: Breath sounds normal. No respiratory distress. He has no wheezes. He has no rhonchi. He has no rales.   Abdominal: Abdomen is soft. He exhibits no distension. There is no abdominal tenderness. There is no rebound and no guarding.   Musculoskeletal:         General: No tenderness or edema. Normal range of motion.      Cervical back: Normal range of motion.     Neurological: He is alert and oriented to person, place, and time. He has normal strength.   No facial droop  EOMI   Equal strength and sensation in bilateral upper and lower extremities  No drift in bilateral upper or lower extremities   Normal finger-to-nose      Skin: Skin is warm and dry. Capillary refill takes less than 2 seconds.   Psychiatric: He has a normal mood and affect.         ED Course   Procedures  Labs Reviewed   CBC W/ AUTO DIFFERENTIAL - Abnormal; Notable for the following components:       Result Value    RBC 4.36 (*)     Hemoglobin 13.5 (*)     Lymph % 50.2 (*)     All other components within normal limits   COMPREHENSIVE METABOLIC PANEL   MAGNESIUM   LACOSAMIDE (VIMPAT)   ZONISAMIDE LEVEL   URINALYSIS, REFLEX TO URINE CULTURE          Imaging Results    None          Medications - No data to display  Medical Decision Making  54-year-old male who presents with increased seizure-like activity.  He was baseline daily seizures but has had 12 which is more than usual for him.  All  focal, staring episodes.  No grand mal seizures.  Here, he is well-appearing, awake and alert and oriented with a normal neurologic exam.  He was hypertensive but otherwise vitals stable.  No trauma.  No infectious symptoms.  Compliant with medicines.  We will get an EKG, labs, CT head.  Likely will require admission but patient was stating he would like to go home.  If he refuses to be admitted, we will have him follow up closely with his neurologist.    Labs reassuring.  Still waiting on CT head and UA.  I spoke with the patient and recommended admission for further workup however he was refusing to stay.  He states he will call his neurologist on Monday or come back if anything gets worse.  Signed out pending head CT.    Amount and/or Complexity of Data Reviewed  Labs: ordered.  Radiology: ordered.  ECG/medicine tests: ordered and independent interpretation performed.     Details: Sinus, regular, rate 90s, normal intervals, normal ST segments overall normal EKG                                      Clinical Impression:  Final diagnoses:  [R56.9] Seizure (Primary)                 Magi Arshad MD  06/08/24 8101

## 2024-06-11 LAB — ZONISAMIDE SERPL-MCNC: 3.8 MCG/ML (ref 10–40)

## 2024-06-12 LAB — LACOSAMIDE: 11.4 MCG/ML (ref 1–10)

## 2024-06-14 ENCOUNTER — TELEPHONE (OUTPATIENT)
Dept: NEUROLOGY | Facility: CLINIC | Age: 54
End: 2024-06-14
Payer: MEDICAID

## 2024-06-14 NOTE — TELEPHONE ENCOUNTER
ALISON placed call to patient and spoke to them verbally [phone] .      ALISON introduced himself as a therapist and  that supports patient's with neurological symptoms at the clinic at [facility].     ALISON reported he works closely with the neurologists and staff such as: Maria R Blankenship     Scheduled virtual visit for the following date: 6/28 at 10:00 am in person.

## 2024-06-28 ENCOUNTER — OFFICE VISIT (OUTPATIENT)
Dept: NEUROLOGY | Facility: CLINIC | Age: 54
End: 2024-06-28
Payer: MEDICAID

## 2024-06-28 ENCOUNTER — LAB VISIT (OUTPATIENT)
Dept: LAB | Facility: HOSPITAL | Age: 54
End: 2024-06-28
Payer: MEDICAID

## 2024-06-28 DIAGNOSIS — G40.319 GENERALIZED EPILEPSY, INTRACTABLE: ICD-10-CM

## 2024-06-28 DIAGNOSIS — R51.9 NONINTRACTABLE HEADACHE, UNSPECIFIED CHRONICITY PATTERN, UNSPECIFIED HEADACHE TYPE: ICD-10-CM

## 2024-06-28 DIAGNOSIS — G40.319 GENERALIZED EPILEPSY, INTRACTABLE: Primary | ICD-10-CM

## 2024-06-28 DIAGNOSIS — Z91.89 AT RISK FOR MEDICATION NONADHERENCE: ICD-10-CM

## 2024-06-28 DIAGNOSIS — F32.A DEPRESSION, UNSPECIFIED DEPRESSION TYPE: Primary | ICD-10-CM

## 2024-06-28 DIAGNOSIS — R79.89 LOW VITAMIN D LEVEL: Primary | ICD-10-CM

## 2024-06-28 DIAGNOSIS — F32.A DEPRESSION, UNSPECIFIED DEPRESSION TYPE: ICD-10-CM

## 2024-06-28 LAB — 25(OH)D3+25(OH)D2 SERPL-MCNC: 25 NG/ML (ref 30–96)

## 2024-06-28 PROCEDURE — 99213 OFFICE O/P EST LOW 20 MIN: CPT | Mod: 95,,,

## 2024-06-28 PROCEDURE — 82306 VITAMIN D 25 HYDROXY: CPT | Performed by: PSYCHIATRY & NEUROLOGY

## 2024-06-28 PROCEDURE — 99211 OFF/OP EST MAY X REQ PHY/QHP: CPT | Mod: PBBFAC | Performed by: SOCIAL WORKER

## 2024-06-28 PROCEDURE — 90791 PSYCH DIAGNOSTIC EVALUATION: CPT | Mod: ,,, | Performed by: SOCIAL WORKER

## 2024-06-28 PROCEDURE — 80203 DRUG SCREEN QUANT ZONISAMIDE: CPT

## 2024-06-28 PROCEDURE — 80235 DRUG ASSAY LACOSAMIDE: CPT

## 2024-06-28 PROCEDURE — 99999 PR PBB SHADOW E&M-EST. PATIENT-LVL I: CPT | Mod: PBBFAC,,, | Performed by: SOCIAL WORKER

## 2024-06-28 RX ORDER — CLOBAZAM 20 MG/1
20 TABLET ORAL NIGHTLY
Qty: 90 TABLET | Refills: 1 | Status: SHIPPED | OUTPATIENT
Start: 2024-06-28 | End: 2024-12-25

## 2024-06-28 RX ORDER — LACOSAMIDE 200 MG/1
200 TABLET ORAL 2 TIMES DAILY
Qty: 180 TABLET | Refills: 1 | Status: SHIPPED | OUTPATIENT
Start: 2024-06-28 | End: 2024-12-25

## 2024-06-28 RX ORDER — ERGOCALCIFEROL 1.25 MG/1
50000 CAPSULE ORAL
Qty: 8 CAPSULE | Refills: 0 | Status: SHIPPED | OUTPATIENT
Start: 2024-06-28

## 2024-06-28 RX ORDER — ZONISAMIDE 100 MG/1
200 CAPSULE ORAL 2 TIMES DAILY
Qty: 360 CAPSULE | Refills: 3 | Status: SHIPPED | OUTPATIENT
Start: 2024-06-28 | End: 2025-06-28

## 2024-06-28 NOTE — PROGRESS NOTES
Recent breakthrough seizure -> orders placed to check medication levels. Phone call/audio visit added on for today at noon.     Maria R Blankenship PA-C   Neurology-Epilepsy  [facility]

## 2024-06-28 NOTE — PROGRESS NOTES
"Psychiatry Initial Visit (PhD/LCSW)  Diagnostic Interview - CPT 06324    Date: 6/28/2024    Site: [facility]    Referral source: Maria R Blankenship PA-C    Clinical status of patient: Outpatient    Irvin Meza, a 54 y.o. male, for initial evaluation visit.  Met with patient.    Chief complaint/reason for encounter: depression, mood swings, anger, somatic, and interpersonal    History of present illness:   Patient began session by reporting that he has met LCSW on a couple occasions while he was inpatient.  LCSW did recall meeting patient while patient was in the EMU.  Patient went on to report his long hx of epilepsy and long hx of treatment.  He reports seeing different providers at different healthcare organizations. He reports the best support has been at this healthcare provider.      Fina reports a hx of seizures impacting his ability to work.  He continued to work as much as he could throughout the years in order to support his family.  He has had relationships and he reports he is .  He is not interested in dating since he does not want to burden a girlfriend / potential partner with his epilepsy.     LCSW introduced the common core belief of being a burden with people with epilepsy.  Discussed the trauma of "doing this to family members." He reports that is how he feels and he feels guilty since his grandson thinks something is wrong.  His grandson even mentioned Yazdanism concerns such as being possessed.      He currently is staying with his mother and he rotates between staying with different family members.     Pain: noncontributory    Symptoms:   Mood: depressed mood, diminished interest, psychomotor agitation, worthlessness/guilt, tearfulness, and social isolation  Anxiety: decreased memory, excessive anxiety/worry, restlessness/keyed up, irritability, specific phobias, and social phobia  Substance abuse: denied  Cognitive functioning: denied  Health behaviors: " noncontributory    Psychiatric history: none    Medical history: epilepsy     Family history of psychiatric illness: not known    Social history (marriage, employment, etc.):   Single /   Has adult children and has several grandchildren  Currently not working but has a long hx of working different types of jobs  Currently staying with his mother but he moves between different family members    Substance use:   Alcohol: none   Drugs: none   Tobacco: none   Caffeine: frequent / occasional    Current medications and drug reactions (include OTC, herbal): see medication list     Strengths and liabilities: Strength: Patient accepts guidance/feedback, Strength: Patient is expressive/articulate., Strength: Patient is motivated for change., Strength: Patient has positive support network., Liability: Patient lacks coping skills.    Current Evaluation:     Mental Status Exam:  General Appearance:  age appropriate, normal weight, casually dressed, neatly groomed   Speech: normal tone, normal rate, normal pitch, normal volume      Level of Cooperation: cooperative      Thought Processes: normal and logical   Mood: steady, depressed      Thought Content: normal, no suicidality, no homicidality, delusions, or paranoia   Affect: congruent and appropriate   Orientation: Oriented x3   Memory: Poor memory as per patient    Attention Span & Concentration: intact   Fund of General Knowledge: intact and appropriate to age and level of education   Abstract Reasoning: Not assessed    Judgment & Insight: good     Language  intact     Diagnostic Impression - Plan:       ICD-10-CM ICD-9-CM   1. Depression, unspecified depression type  F32.A 311   2. Generalized epilepsy, intractable  G40.319 345.91       Plan:individual psychotherapy    Return to Clinic: 1 month    Length of Service (minutes): 60

## 2024-06-29 LAB — ZONISAMIDE SERPL-MCNC: <1 MCG/ML (ref 10–40)

## 2024-06-30 LAB — LACOSAMIDE: 4.5 MCG/ML (ref 1–10)

## 2024-07-01 PROBLEM — F32.A DEPRESSION: Status: ACTIVE | Noted: 2024-07-01

## 2024-07-03 ENCOUNTER — OFFICE VISIT (OUTPATIENT)
Dept: NEUROLOGY | Facility: CLINIC | Age: 54
End: 2024-07-03
Payer: MEDICAID

## 2024-07-03 VITALS
DIASTOLIC BLOOD PRESSURE: 84 MMHG | SYSTOLIC BLOOD PRESSURE: 125 MMHG | BODY MASS INDEX: 21.99 KG/M2 | HEART RATE: 97 BPM | HEIGHT: 73 IN

## 2024-07-03 DIAGNOSIS — Z91.89 AT RISK FOR MEDICATION NONADHERENCE: ICD-10-CM

## 2024-07-03 DIAGNOSIS — F06.30 MOOD DISORDER DUE TO MEDICAL CONDITION: ICD-10-CM

## 2024-07-03 DIAGNOSIS — F32.A DEPRESSION, UNSPECIFIED DEPRESSION TYPE: ICD-10-CM

## 2024-07-03 DIAGNOSIS — R51.9 NONINTRACTABLE HEADACHE, UNSPECIFIED CHRONICITY PATTERN, UNSPECIFIED HEADACHE TYPE: ICD-10-CM

## 2024-07-03 DIAGNOSIS — R79.89 LOW VITAMIN D LEVEL: ICD-10-CM

## 2024-07-03 DIAGNOSIS — G40.319 GENERALIZED EPILEPSY, INTRACTABLE: Primary | ICD-10-CM

## 2024-07-03 PROCEDURE — 3008F BODY MASS INDEX DOCD: CPT | Mod: CPTII,,,

## 2024-07-03 PROCEDURE — 3079F DIAST BP 80-89 MM HG: CPT | Mod: CPTII,,,

## 2024-07-03 PROCEDURE — 99215 OFFICE O/P EST HI 40 MIN: CPT | Mod: S$PBB,FS,,

## 2024-07-03 PROCEDURE — 99213 OFFICE O/P EST LOW 20 MIN: CPT | Mod: PBBFAC

## 2024-07-03 PROCEDURE — 3074F SYST BP LT 130 MM HG: CPT | Mod: CPTII,,,

## 2024-07-03 PROCEDURE — 99999 PR PBB SHADOW E&M-EST. PATIENT-LVL III: CPT | Mod: PBBFAC,,,

## 2024-07-03 PROCEDURE — 1159F MED LIST DOCD IN RCRD: CPT | Mod: CPTII,,,

## 2024-07-03 RX ORDER — HYDRALAZINE HYDROCHLORIDE 50 MG/1
TABLET, FILM COATED ORAL
COMMUNITY
Start: 2024-06-21

## 2024-07-03 RX ORDER — LACOSAMIDE 200 MG/1
200 TABLET ORAL 2 TIMES DAILY
Qty: 180 TABLET | Refills: 1 | Status: SHIPPED | OUTPATIENT
Start: 2024-07-03 | End: 2024-12-30

## 2024-07-03 RX ORDER — SERTRALINE HYDROCHLORIDE 100 MG/1
100 TABLET, FILM COATED ORAL NIGHTLY
Qty: 90 TABLET | Refills: 3 | Status: SHIPPED | OUTPATIENT
Start: 2024-07-03 | End: 2025-07-03

## 2024-07-03 NOTE — PROGRESS NOTES
"Name: Irvin Meza  MRN:[id number]   CSN: [id number]  Date of service: 7/3/2024  Age:54 y.o.   Gender:male   Referring Physician/Service: No referring provider defined for this encounter.   The patient is here today with: self    Neurology Clinic: Follow-up Visit    CHIEF COMPLAINT:  Epilepsy    Interval Events/ROS 7/3/2024:    Current ASM/SEs:   Lacosamide 200mg BID  Zonisamide 200mg BID   Clobazam 20mg qhs  No side effects per pt     Breakthrough seizures/events: daily "black out" spells, no further convulsions  Driving: no  Sleep: doesn't sleep much  Mood: stable, sertraline 50mg qd, following w/ SW Yosvany Cast    Persistent headaches. Tizanidine (usually takes 6mg) helps for a short time but then headache returns. Low vit d -> taking supplement as prescribed. Otherwise, no fever, no cold symptoms, no changes in vision, no new weakness, no chest pain, no shortness of breath, no nausea, no vomiting, no diarrhea, no constipation, no tingling/numbness, no problems walking.    Recent Labs   Lab 07/11/21  1627 10/07/21  1645 02/11/22  1619 05/31/22  1125 10/10/23  1749 04/15/24  1151 06/08/24 2024 06/28/24  0905   Levetiracetam Lvl <1.0 13.2 36.8 21.7 13.8  --   --   --    Lacosamide  --   --   --   --   --  12.8 H 11.4 H 4.5   Zonisamide  --   --   --  2.6 L  --  <1.0 L 3.8 L <1.0 L       Interval Events/ROS 6/28/2024:     Visit conducted via audio/phone call.      Current ASM/SEs: lacosamide 200mg BID and zonisamide 200mg BID; no SE  Breakthrough seizures/events: persistent daily "black out spells", recent convulsion earlier this month for which he was evaluated at the ED  Driving: no  Sleep: not great  Mood: fair, apathetic, but stable w/ no new issues     Intermittent headaches. Otherwise, no fever, no cold symptoms, no changes in vision, no new weakness, no chest pain, no shortness of breath, no nausea, no vomiting, no diarrhea, no constipation, no tingling/numbness, no problems walking. No ETOH use. " "    Interval Events/ROS 4/15/2024:    Current ASM/SEs: lacosamide 200mg BID and zonisamide 300mg qhs; no SE  Breakthrough seizures/events: no GTCs, "black out spells" w/ confusion and +UI still occurring around 1-2x/week  Driving: no  Sleep: better  Mood: improved, no longer having suicidal thoughts, however still feels upset/irritable majority of the time, constant "chip on his shoulder", does not like having to depend on others for things, feels like a burden to family members, taking sertraline 25mg qd     Saw PCP last month. Intermittent headaches. Otherwise, no fever, no cold symptoms, no changes in vision, no new weakness, no chest pain, no shortness of breath, no nausea, no vomiting, no diarrhea, no constipation, no tingling/numbness, no problems walking.      Interval Events/ROS 12/11/2023:    Current ASM/SEs: lacosamide 150mg BID and zonisamide 100mg qhs (prescribed 200mg); no SE; transitioned from LEV to lacosamide during EMU admission (ineffective + mood SE)  Breakthrough seizures/events: no GTCs since EMU 10/11/2023 which is a major improvement, though does experience smaller events consisting of odd behavior/yelling/decreased responsiveness that still occur weekly; last event of this type was yesterday 12/10/23  Driving: no  Sleep: fair, some nights good, some nights stays up thinking a lot   Mood: up and down    Otherwise, no fever, no cold symptoms, no current headache, no changes in vision, no new weakness, no chest pain, no shortness of breath, no nausea, no vomiting, no diarrhea, no constipation, no tingling/numbness, no problems walking. May go live with one of his daughters out of state for a few months.     EMU 10/11/2023-10/12/2023  Currently maintained on Levetiracetam 1500 mg TID and Zonisamide 300 mg nightly with continued uncontrolled seizures and reports of significant mood effects of anger and depression.  10/11>10/12: Levetiracetam discontinued 10/11 pm, EEG overnight with 3-4 hz " generalized spike-wave discharges along with generalized polyspike and wave discharges suggestive of an idiopathic generalized epilepsy syndrome, no discrete seizures recorded. Loaded with Lacosamide 400 mg x1 10/12, patient to continue Lacosamide 150 mg BID maintenance dose in addition to Zonisamide 300 mg nightly. Discontinue Levetiracetam given mood side effects and continued seizures when taking. Outpatient follow up in Neurology Headache clinic for headache management, follow up as scheduled in Neurology Epilepsy clinic for further management. Discharged home in stable condition 10/12.      Interval Events/ROS 10/10/2023:    Current ASM/SEs: levetiracetam 1500mg TID (6am, 12pm, 10pm) and zonisamide 300mg nightly (10pm); SE mood issues   Breakthrough seizures/events: uncontrolled convulsions, continues to experience multiple GTCs weekly despite compliance with medications  Driving: no  Sleep: not well because of headaches that wake him up, reports he was recently dx w/ cluster headaches (intense pain to L side of head w/ unilateral tearing + rhinorrhea, occurring near-daily for the past few months)   Mood: depressed, apathetic towards living    Otherwise, no fever, no changes in vision, no new weakness, no chest pain, no shortness of breath, no nausea, no vomiting, no diarrhea, no constipation, no tingling/numbness, no problems walking.    Interval Events/ROS 8/4/2023:    Current ASM/SEs: levetiracetam 1500mg TID, does not recall taking zonisamide; states mom and daughter help remind him to take his meds; mood SE  Breakthrough seizures/events: brief 'black out' spells daily; last convulsive event was 7/10/2023 that occurred in the setting of missed medication per EMR -> evaluated at [facility]  Driving: no  Sleep: not good  Mood: depressed, passive suicidal ideation, not taking sertraline due to GI side effects     Headaches. Otherwise, no fever, no cold symptoms, no changes in vision, no new weakness, no  chest pain, no shortness of breath, no nausea, no vomiting, no diarrhea, no constipation, no tingling/numbness, no problems walking.     Interval Events/ROS 8/31/2022:    Current ASM/SEs: patient is taking levetiracetam 500mg TID and zonisamide 100mg BID; no reported side effects    Breakthrough seizures/events: generalized convulsion 07/03/2022, prior to that 04/2022; blackout/dyscognitive episodes 3-4 in the past two months  Driving: no  Folic acid: no  Sleep: sleeps well if head is not hurting   Mood: okay    Frequent headaches 3-4 days per week for the past 2 months described as a throbbing pain to L side of head and behind L eye. Associated photophobia. Denies N/V. Relieved w/ excedrin migraine. Otherwise, no fever, no cold symptoms, no changes in vision, no new weakness, no chest pain, no shortness of breath, no nausea, no vomiting, no diarrhea, no constipation, no tingling/numbness, no problems walking.     Interval Events/ROS 5/31/2022:    Breakthrough event 04/03/2022 despite levetiracetam 1500mg BID and zonisamide 100mg BID (originally prescribed 300mg qhs but patient states he tries not to take a lot of medication). No missed doses. No reported side effects. No identifiable trigger of recent breakthrough event though does mention he is not sleeping well at night, not sure why. Not working. Trying to work with  regarding disability. Denies feeling depressed though mentions he does get angry a lot. No SI or HI. Otherwise, no fever, no cold symptoms, no headache, no changes in vision, no new weakness, no chest pain, no shortness of breath, no nausea, no vomiting, no diarrhea, no constipation, no tingling/numbness, no problems walking.    Interval Events/ROS 2/11/2022:  Seizure 1/18/2022, trying to get out. Black out. This year 1 seizure. Keppra is okay. 5-6 seizure over the whole year in 2021 despite compliance. Taking keppra. Caught a seizure in his sleep.  Head laceration.  Now a helmet has been  "ordered to protect him during sleep.  Covid vaccination and boosted. Trying to get bone density test. Pain especially in the back. Mood okay. Not working. Only way to get job is if he lied. Trying to getting disability. Letter written. Needs to get a . Close up vision is blurry. No eye doctor. Vimpat twice daily but he did not have any refills on this medications so did not continue it.  Lacosamide caused poor appetite. Otherwise, no fever, no cold symptoms, no new weakness, no chest pain, no shortness of breath, no nausea, no vomiting, no diarrhea, no constipation, no tingling/numbness, no problems walking, no mood issues.    HPI 10/7/2021:     Age of first seizure: born with seizures, seizures as an infant   Seizure Risk Factors: no family history of seizures, term, unclear about details of birth but does not think he stayed in the hospital long, no CNS infections, several head strikes with seizures only   Time of Last Seizure: week ago   # of lifetime Seizures: 1000+  Frequency of Seizures:  At most 2 in a day, 1-2 seizures per month  Seizure Triggers:  Overheating, strong smells like perfume and gasoline, lights and too much TV, etoh  Injuries/Hospitalization for seizures? Head strike, tongue bite, fractured arms, lost teeth (had to get teeth pulled)  Bone Health: Dexa at [facility] but does not know results      Auras:  Headache and "weird feeling" -> space out, will start stuttering     Seizure Events:   1. Generalized convusions, Banging head back, eye roll back, does not want anyone to touch him, will throw people around, urinary incontinence, tired afterwards, couple days to get back to normal, sore afterwards with a persistent headache   2. Black out, wander off, walk across the stress, act funny, can be combative, does not want to be touched, very tired    Current AED/SEs:  1. Levetiracetam 1000 mg twice daily SE still having seizures but frequently misses doses    Previous AED/SEs or reason for DC.   1. " "Phenytoin SE dental issues     EEG: [facility] -> 3-4 Hz discharges per care everywhere tab    CT 2011: normal     Other Allergies: none      AED compliance, adherence: misses some doses     ROS 10/7/2021:  Vaccination. Problems with taste after seizures. Otherwise, denies headache at this moment, loss of vision, blurred vision, diplopia, dysarthria, dysphagia, lightheadedness, vertigo, tinnitus or hearing difficulty. Denies difficulties producing or comprehending speech.  Denies focal weakness, numbness, paresthesias. Denies difficulty with gait. Denies cough, shortness of breath.  Denies chest pain or tightness, palpitations.  Denies nausea, vomiting, diarrhea, constipation or abdominal pain.  No bowel or bladder incontinence or retention.  No falls.       EXAM:   - Vitals: /84   Pulse 97   Ht 6' 1" (1.854 m)   BMI 21.99 kg/m²    - General: Awake, cooperative, NAD.   - HEENT: NC/AT, edentulous  - Neck: muscle spasms   - Pulmonary: no increased WOB  - Cardiac: well perfused   - Abdomen: soft, nontender, nondistended  - Extremities: no edema  - Skin: no rashes or lesions noted.     NEURO EXAM:   - Mental Status: Awake, alert, oriented x 3. Able to relate history, some difficulty with details. Attentive to examiner. Language is fluent with intact repetition and comprehension. Normal prosody. There were no paraphasic errors. Able to name both high and low frequency objects. Speech was mildly dysarthric (edentulous). Able to follow both midline and appendicular commands. There was no evidence of apraxia or neglect.    - Cranial Nerves:  VFF to confrontation. EOMI. No facial droop. Hearing intact to room voice. 5/5 strength in trapezii and SCM bilaterally.     - Motor: Normal bulk and tone throughout. No pronator drift bilaterally. No adventitious movements such as tremor or asterixis noted.     Delt Bic Tri WrE WrF  FFl FE IO IP Quad Ham TA Gastroc   R   5     5    5    5    5        5   5    5  "  5    5        5     5      5            L   5      5    5   5    5        5    5   5    5    5       5     5      5              - Sensory: No deficits to light touch. No extinction to DSS.  - Coordination: No dysmetria on FNF   - Gait: Good initiation. Narrow-based, normal stride and arm swing. Romberg negative.    PLAN: 54-year-old man with intractable generalized epilepsy (3-4 hz generalized discharges on EEG) with frequent generalized convulsions and dyscognitive episodes with bizarre behaviors. Persistent daily dyscognitive events, last convulsion was 6/8/2024. Fluctuating medication levels, somewhat difficult to assess adherence, pt denies any missed doses and states his daughters assist in ensuring he takes his medication. Clobazam added last week, tolerating fine. Continue medications at same doses for now: clobazam 20mg qhs, lacosamide 200mg BID, and zonisamide 200mg BID. Level check + medication adjustments pending results w/ additional breakthrough convulsions. Increase sertraline to 100mg qd for mood and continue sessions w/ SW Yosvany Cast. Vit D. Continue to follow w/ headache team + tizanidine 2mg PRN qhs to help w/ muscle spasms/cervicalgia component of headaches. Advised to call the clinic w/ any issues. Patient is comfortable with plan. All questions and concerns are addressed at this time. Follow up in about 3 months (around 10/3/2024).     Patient Instructions   You came to Epilepsy Clinic because of your seizure disorder. Please continue the same medications at the same dose.     Do not miss any doses of medication. If a dose of medication is missed, take it as soon as it is remembered even if that means doubling up on the dose.     Get regular sleep. Go to sleep at the same time and wake up at the same time every day. People with epilepsy require more sleep than people without epilepsy.  Sleeping 10-12 hours a day can be normal for a person with epilepsy.  Every seizure makes it harder to  prevent the next seizure. Epilepsy is associated with SUDEP, or sudden unexpected death in epilepsy.  The risk is significantly higher if convulsive seizures are not well controlled. For more information, check out these websites: https://www.epilepsy.com/, https://www.epilepsyalliance[place].org/, www.[place]-epilepsy.org, www.womenandepilepsy.org.  If you are interested in meeting other individuals in our epilepsy community, please reach out to the [facility] [place] ([phone], [phone], [email or web address]).  They organize many informative and fun activities in the region.  They can provide you invaluable information on how to get access to resources available for patients living with epilepsy as well as a rich community of like-minded individuals who are all learning to cope with the same issues.  It is very important to remember, you are not alone.     Per [place] law, no episodes of loss of consciousness for 6 months before driving.  Avoid dangerous situations.  For example, no baths/pools alone, no heights, no power tools.  Wear a bike helmet.  If breakthrough seizures occur that are different in character, frequency, or duration from normal episodes, please patient portal me or call the office and we will decide the next steps. If multiple seizures occur in a row without return back to baseline, 911 needs to be called.     Return to clinic in 3 months or sooner with issues.  Please patient portal with any questions or concerns.    Maria R Blankenship PA-C   Neurology-Epilepsy  [facility]     Problem List Items Addressed This Visit          Neuro    Generalized epilepsy, intractable - Primary    Relevant Medications    lacosamide (VIMPAT) 200 mg Tab tablet    Other Relevant Orders    Lacosamide (Vimpat)    Zonisamide level    Clobazam    Headache       Psychiatric    Depression     Other Visit Diagnoses       At risk for medication nonadherence        Low vitamin D  level        Mood disorder due to medical condition        Relevant Medications    sertraline (ZOLOFT) 100 MG tablet          Disclaimer: This note has been generated using voice-recognition software. There may be typographical errors that were missed during proof-reading.     LABS:  Recent Labs   Lab 02/11/22  1619 04/03/22  1518 09/04/22 2006 05/21/23  1650 10/10/23  1749 04/15/24  1151 06/08/24 2024   WBC 5.85  --   --   --    < > 5.97 7.39   Hemoglobin 14.9  --   --   --    < > 14.4 13.5 L   Hematocrit 45.2  --   --   --    < > 45.2 42.1   Platelets 231  --   --   --    < > 235 219   Sodium 141   < > 138 140   < > 142 143   Potassium 4.4   < > 4.0 3.8   < > 3.4 L 4.5   BUN 12   < > 13.2 9.4   < > 11 9   Creatinine 1.0   < > 1.12 1.14   < > 1.1 1.3   eGFR if  >60.0  --   --   --   --   --   --    eGFR   --    < > 88 L 78 L  --   --   --    eGFR if non  >60.0  --   --   --   --   --   --     < > = values in this interval not displayed.       Recent Labs   Lab 07/11/21  1627 10/07/21  1645 02/11/22  1619 05/31/22  1125 10/10/23  1749 04/15/24  1151 06/08/24  2024 06/28/24  0905   Levetiracetam Lvl <1.0 13.2 36.8 21.7 13.8  --   --   --    Lacosamide  --   --   --   --   --  12.8 H 11.4 H 4.5   Zonisamide  --   --   --  2.6 L  --  <1.0 L 3.8 L <1.0 L   Phenytoin Lvl <0.1 L  --   --   --   --   --   --   --         IMAGING:  Recent imaging is personally reviewed with the patient.    None in the system.    PAST MEDICAL HISTORY:   Active Ambulatory Problems     Diagnosis Date Noted    Generalized epilepsy, intractable 10/07/2021    Poor compliance with medication 10/07/2021    Headache 10/10/2023    Intermittent chest pain 10/11/2023    Paroxysmal SVT (supraventricular tachycardia) 10/11/2023    Cluster headache, not intractable 09/12/2023    Decreased ROM of neck 11/02/2023    Muscle tone increased 11/02/2023    Decreased strength 11/02/2023    Depression 07/01/2024      Resolved Ambulatory Problems     Diagnosis Date Noted    No Resolved Ambulatory Problems     Past Medical History:   Diagnosis Date    Seizures         PAST SURGICAL HISTORY: No past surgical history on file.     ALLERGIES: Patient has no known allergies.   CURRENT MEDICATIONS:   Current Outpatient Medications   Medication Sig Dispense Refill    cloBAZam (ONFI) 20 mg Tab Take 1 tablet (20 mg total) by mouth nightly. 90 tablet 1    ergocalciferol (ERGOCALCIFEROL) 50,000 unit Cap Take 1 capsule (50,000 Units total) by mouth every 7 days. (Take 1 capsule one time per week for the next 8 weeks) 8 capsule 0    lacosamide (VIMPAT) 200 mg Tab tablet Take 1 tablet (200 mg total) by mouth 2 (two) times a day. 180 tablet 1    sertraline (ZOLOFT) 50 MG tablet Take 1 tablet (50 mg total) by mouth once daily. 90 tablet 3    tiZANidine (ZANAFLEX) 2 MG tablet Take 1 tablet (2 mg total) by mouth nightly as needed (headache and/or muscle spasms). 30 tablet 5    verapamiL (VERELAN) 180 MG C24P Take 1 capsule (180 mg total) by mouth once daily. 30 capsule 5    zonisamide (ZONEGRAN) 100 MG Cap Take 2 capsules (200 mg total) by mouth 2 (two) times daily. 360 capsule 3     No current facility-administered medications for this visit.        FAMILY HISTORY: No family history on file.      SOCIAL HISTORY:   Social History     Socioeconomic History    Marital status: Single   Tobacco Use    Smoking status: Every Day     Current packs/day: 1.00     Types: Cigarettes    Smokeless tobacco: Never   Substance and Sexual Activity    Alcohol use: Yes     Comment: every now and then    Drug use: No      Questions and concerns raised by the patient and family/care-giver(s) were addressed and they indicated understanding of everything discussed and agreed to plans as above.    Maria R Blankenship PA-C   Neurology-Epilepsy  [facility]    Collaborating physician, Dr. Chase Núñez, was available during today's encounter.     I spent  approximately 54 minutes on the day of this encounter preparing to see the patient, obtaining and reviewing history and results, performing a medically appropriate exam, counseling and educating the patient/family/caregiver, documenting clinical information, coordinating care, and ordering medications, tests, procedures, and referrals.

## 2024-07-05 NOTE — PATIENT INSTRUCTIONS
You came to Epilepsy Clinic because of your seizure disorder. Please continue the same medications at the same dose.     Do not miss any doses of medication. If a dose of medication is missed, take it as soon as it is remembered even if that means doubling up on the dose.     Get regular sleep. Go to sleep at the same time and wake up at the same time every day. People with epilepsy require more sleep than people without epilepsy.  Sleeping 10-12 hours a day can be normal for a person with epilepsy.  Every seizure makes it harder to prevent the next seizure. Epilepsy is associated with SUDEP, or sudden unexpected death in epilepsy.  The risk is significantly higher if convulsive seizures are not well controlled. For more information, check out these websites: https://www.epilepsy.com/, https://www.epilepsyalliance[place].org/, www.[place]-epilepsy.org, www.womenandepilepsy.org.  If you are interested in meeting other individuals in our epilepsy community, please reach out to the [facility] [place] ([phone], [phone], [email or web address]).  They organize many informative and fun activities in the region.  They can provide you invaluable information on how to get access to resources available for patients living with epilepsy as well as a rich community of like-minded individuals who are all learning to cope with the same issues.  It is very important to remember, you are not alone.     Per [place] law, no episodes of loss of consciousness for 6 months before driving.  Avoid dangerous situations.  For example, no baths/pools alone, no heights, no power tools.  Wear a bike helmet.  If breakthrough seizures occur that are different in character, frequency, or duration from normal episodes, please patient portal me or call the office and we will decide the next steps. If multiple seizures occur in a row without return back to baseline, 911 needs to be called.     Return to clinic in 3 months or  sooner with issues.  Please patient portal with any questions or concerns.    Maria R Blankenship PA-C   Neurology-Epilepsy  [facility]

## 2024-07-05 NOTE — PROGRESS NOTES
"Name: Irvin Meza  MRN:[id number]   CSN: [id number]  Date of service: 7/5/2024  Age:54 y.o.   Gender:male   Referring Physician/Service: No referring provider defined for this encounter.   The patient is here today with: self    Neurology Virtual Clinic:  Follow-up Visit    CHIEF COMPLAINT:  Epilepsy    Interval Events/ROS 6/28/2024:    Visit conducted via audio/phone call.     Current ASM/SEs: lacosamide 200mg BID and zonisamide 200mg BID; no SE  Breakthrough seizures/events: persistent daily "black out spells", recent convulsion earlier this month for which he was evaluated at the ED  Driving: no  Sleep: not great  Mood: fair, apathetic, but stable w/ no new issues    Intermittent headaches. Otherwise, no fever, no cold symptoms, no changes in vision, no new weakness, no chest pain, no shortness of breath, no nausea, no vomiting, no diarrhea, no constipation, no tingling/numbness, no problems walking. No ETOH use.     Recent Labs   Lab 07/11/21  1627 10/07/21  1645 02/11/22  1619 05/31/22  1125 10/10/23  1749 04/15/24  1151 06/08/24 2024 06/28/24  0905   Levetiracetam Lvl <1.0 13.2 36.8 21.7 13.8  --   --   --    Lacosamide  --   --   --   --   --  12.8 H 11.4 H 4.5   Zonisamide  --   --   --  2.6 L  --  <1.0 L 3.8 L <1.0 L       Interval Events/ROS 4/15/2024:    Current ASM/SEs: lacosamide 200mg BID and zonisamide 300mg qhs; no SE  Breakthrough seizures/events: no GTCs, "black out spells" w/ confusion and +UI still occurring around 1-2x/week  Driving: no  Sleep: better  Mood: improved, no longer having suicidal thoughts, however still feels upset/irritable majority of the time, constant "chip on his shoulder", does not like having to depend on others for things, feels like a burden to family members, taking sertraline 25mg qd     Saw PCP last month. Intermittent headaches. Otherwise, no fever, no cold symptoms, no changes in vision, no new weakness, no chest pain, no shortness of breath, no nausea, no vomiting, " no diarrhea, no constipation, no tingling/numbness, no problems walking.      Interval Events/ROS 12/11/2023:    Current ASM/SEs: lacosamide 150mg BID and zonisamide 100mg qhs (prescribed 200mg); no SE; transitioned from LEV to lacosamide during EMU admission (ineffective + mood SE)  Breakthrough seizures/events: no GTCs since EMU 10/11/2023 which is a major improvement, though does experience smaller events consisting of odd behavior/yelling/decreased responsiveness that still occur weekly; last event of this type was yesterday 12/10/23  Driving: no  Sleep: fair, some nights good, some nights stays up thinking a lot   Mood: up and down    Otherwise, no fever, no cold symptoms, no current headache, no changes in vision, no new weakness, no chest pain, no shortness of breath, no nausea, no vomiting, no diarrhea, no constipation, no tingling/numbness, no problems walking. May go live with one of his daughters out of state for a few months.     EMU 10/11/2023-10/12/2023  Currently maintained on Levetiracetam 1500 mg TID and Zonisamide 300 mg nightly with continued uncontrolled seizures and reports of significant mood effects of anger and depression.  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 Loaded with Lacosamide 400 mg x1 10/12, patient to continue Lacosamide 150 mg BID maintenance dose in addition to Zonisamide 300 mg nightly. Discontinue Levetiracetam given mood side effects and continued seizures when taking. Outpatient follow up in Neurology Headache clinic for headache management, follow up as scheduled in Neurology Epilepsy clinic for further management. Discharged home in stable condition 10/12.      Interval Events/ROS 10/10/2023:    Current ASM/SEs: levetiracetam 1500mg TID (6am, 12pm, 10pm) and  zonisamide 300mg nightly (10pm); SE mood issues   Breakthrough seizures/events: uncontrolled convulsions, continues to experience multiple GTCs weekly despite compliance with medications  Driving: no  Sleep: not well because of headaches that wake him up, reports he was recently dx w/ cluster headaches (intense pain to L side of head w/ unilateral tearing + rhinorrhea, occurring near-daily for the past few months)   Mood: depressed, apathetic towards living    Otherwise, no fever, no changes in vision, no new weakness, no chest pain, no shortness of breath, no nausea, no vomiting, no diarrhea, no constipation, no tingling/numbness, no problems walking.    Interval Events/ROS 8/4/2023:    Current ASM/SEs: levetiracetam 1500mg TID, does not recall taking zonisamide; states mom and daughter help remind him to take his meds; mood SE  Breakthrough seizures/events: brief 'black out' spells daily; last convulsive event was 7/10/2023 that occurred in the setting of missed medication per EMR -> [place]  Driving: no  Sleep: not good  Mood: depressed, passive suicidal ideation, not taking sertraline due to GI side effects     Headaches. Otherwise, no fever, no cold symptoms, no changes in vision, no new weakness, no chest pain, no shortness of breath, no nausea, no vomiting, no diarrhea, no constipation, no tingling/numbness, no problems walking.     Interval Events/ROS 8/31/2022:    Current ASM/SEs: patient is taking levetiracetam 500mg TID and zonisamide 100mg BID; no reported side effects    Breakthrough seizures/events: generalized convulsion 07/03/2022, prior to that 04/2022; blackout/dyscognitive episodes 3-4 in the past two months  Driving: no  Folic acid: no  Sleep: sleeps well if head is not hurting   Mood: okay    Frequent headaches 3-4 days per week for the past 2 months described as a throbbing pain to L side of head and behind L eye. Associated photophobia. Denies N/V. Relieved w/ excedrin  migraine. Otherwise, no fever, no cold symptoms, no changes in vision, no new weakness, no chest pain, no shortness of breath, no nausea, no vomiting, no diarrhea, no constipation, no tingling/numbness, no problems walking.     Interval Events/ROS 5/31/2022:    Breakthrough event 04/03/2022 despite levetiracetam 1500mg BID and zonisamide 100mg BID (originally prescribed 300mg qhs but patient states he tries not to take a lot of medication). No missed doses. No reported side effects. No identifiable trigger of recent breakthrough event though does mention he is not sleeping well at night, not sure why. Not working. Trying to work with  regarding disability. Denies feeling depressed though mentions he does get angry a lot. No SI or HI. Otherwise, no fever, no cold symptoms, no headache, no changes in vision, no new weakness, no chest pain, no shortness of breath, no nausea, no vomiting, no diarrhea, no constipation, no tingling/numbness, no problems walking.    Interval Events/ROS 2/11/2022:  Seizure 1/18/2022, trying to get out. Black out. This year 1 seizure. Keppra is okay. 5-6 seizure over the whole year in 2021 despite compliance. Taking keppra. Caught a seizure in his sleep.  Head laceration.  Now a helmet has been ordered to protect him during sleep.  Covid vaccination and boosted. Trying to get bone density test. Pain especially in the back. Mood okay. Not working. Only way to get job is if he lied. Trying to getting disability. Letter written. Needs to get a . Close up vision is blurry. No eye doctor. Vimpat twice daily but he did not have any refills on this medications so did not continue it.  Lacosamide caused poor appetite. Otherwise, no fever, no cold symptoms, no new weakness, no chest pain, no shortness of breath, no nausea, no vomiting, no diarrhea, no constipation, no tingling/numbness, no problems walking, no mood issues.    HPI 10/7/2021:     Age of first seizure: born with seizures,  "seizures as an infant   Seizure Risk Factors: no family history of seizures, term, unclear about details of birth but does not think he stayed in the hospital long, no CNS infections, several head strikes with seizures only   Time of Last Seizure: week ago   # of lifetime Seizures: 1000+  Frequency of Seizures:  At most 2 in a day, 1-2 seizures per month  Seizure Triggers:  Overheating, strong smells like perfume and gasoline, lights and too much TV, etoh  Injuries/Hospitalization for seizures? Head strike, tongue bite, fractured arms, lost teeth (had to get teeth pulled)  Bone Health: Dexa at [facility] but does not know results      Auras:  Headache and "weird feeling" -> space out, will start stuttering     Seizure Events:   1. Generalized convusions, Banging head back, eye roll back, does not want anyone to touch him, will throw people around, urinary incontinence, tired afterwards, couple days to get back to normal, sore afterwards with a persistent headache   2. Black out, wander off, walk across the stress, act funny, can be combative, does not want to be touched, very tired    Current AED/SEs:  1. Levetiracetam 1000 mg twice daily SE still having seizures but frequently misses doses    Previous AED/SEs or reason for DC.   1. Phenytoin SE dental issues     EEG: [facility] -> 3-4 Hz discharges per care everywhere tab    CT 2011: normal     Other Allergies: none      AED compliance, adherence: misses some doses     ROS 10/7/2021:  Vaccination. Problems with taste after seizures. Otherwise, denies headache at this moment, loss of vision, blurred vision, diplopia, dysarthria, dysphagia, lightheadedness, vertigo, tinnitus or hearing difficulty. Denies difficulties producing or comprehending speech.  Denies focal weakness, numbness, paresthesias. Denies difficulty with gait. Denies cough, shortness of breath.  Denies chest pain or tightness, palpitations.  Denies nausea, vomiting, diarrhea, constipation or " abdominal pain.  No bowel or bladder incontinence or retention.  No falls.       EXAM:   - Vitals: There were no vitals taken for this visit.   Majority of exam deferred secondary to audio visit.   NEURO EXAM:   - Mental Status: Awake, alert, oriented x 3. Able to relate history, some difficulty with details. Attentive to examiner. Language is fluent with intact repetition and comprehension. Normal prosody. There were no paraphasic errors. Able to name both high and low frequency objects. Speech was mildly dysarthric (edentulous). Able to follow both midline and appendicular commands. There was no evidence of apraxia or neglect.    PLAN: 54-year-old man with intractable generalized epilepsy (3-4 hz generalized discharges on EEG) with frequent generalized convulsions and dyscognitive episodes with bizarre behaviors. Recent convulsion earlier this month & persistent near-daily staring/dyscognitive episodes -> adjunct w/ clobazam 20mg qhs + levels/labs today to assess compliance. Continue zonisamide 200mg BID and lacosamide 200mg BID. Continue sertraline 50mg qd for mood, also established w/ SW Yosvany Cast. Continue to follow w/ headache team + trial of tizanidine 2mg PRN qhs to help w/ muscle spasms/cervicalgia component of headaches. Patient is comfortable with plan. All questions and concerns are addressed at this time. Will schedule for in-person follow up next week.     Patient Instructions   You came to Epilepsy Clinic because of your seizure disorder.  Please continue the same medications at the same dose.     I would also like you to add a medication called clobazam, please take 1 tablet nightly before bed.     Do not miss any doses of medication. If a dose of medication is missed, take it as soon as it is remembered even if that means doubling up on the dose. Please get a lab test to check out the blood level of medication.     Get regular sleep. Go to sleep at the same time and wake up at the same time every  day. People with epilepsy require more sleep than people without epilepsy.  Sleeping 10-12 hours a day can be normal for a person with epilepsy.  Every seizure makes it harder to prevent the next seizure. Epilepsy is associated with SUDEP, or sudden unexpected death in epilepsy.  The risk is significantly higher if convulsive seizures are not well controlled. For more information, check out these websites: https://www.epilepsy.com/, https://www.epilepsyallianceamerica.org/, www.[place]-epilepsy.org, www.womenandepilepsy.org.  If you are interested in meeting other individuals in our epilepsy community, please reach out to the [facility] [place] ([phone], [phone], [email or web address]).  They organize many informative and fun activities in the region.  They can provide you invaluable information on how to get access to resources available for patients living with epilepsy as well as a rich community of like-minded individuals who are all learning to cope with the same issues.  It is very important to remember, you are not alone.     Per [place] law, no episodes of loss of consciousness for 6 months before driving.  Avoid dangerous situations.  For example, no baths/pools alone, no heights, no power tools.  Wear a bike helmet.  If breakthrough seizures occur that are different in character, frequency, or duration from normal episodes, please patient portal me or call the office and we will decide the next steps. If multiple seizures occur in a row without return back to baseline, 911 needs to be called.     Return to clinic next week for in-person visit.  Please patient portal with any questions or concerns.    Maria R Blankenship PA-C   Neurology-Epilepsy  [facility]     Problem List Items Addressed This Visit          Neuro    Generalized epilepsy, intractable - Primary    Headache       Psychiatric    Depression     Other Visit Diagnoses       At risk for medication  nonadherence              The patient location is: Home  The chief complaint leading to consultation is: Epilepsy  Visit type: Virtual visit with synchronous audio (video unavailable today)  Total time spent with patient: 12 minutes  Each patient to whom he or she provides medical services by telemedicine is:  (1) informed of the relationship between the physician and patient and the respective role of any other health care provider with respect to management of the patient; and (2) notified that he or she may decline to receive medical services by telemedicine and may withdraw from such care at any time.    Disclaimer: This note has been generated using voice-recognition software. There may be typographical errors that were missed during proof-reading.     LABS:  Recent Labs   Lab 02/11/22  1619 04/03/22  1518 09/04/22 2006 05/21/23  1650 10/10/23  1749 04/15/24  1151 06/08/24  2024   WBC 5.85  --   --   --    < > 5.97 7.39   Hemoglobin 14.9  --   --   --    < > 14.4 13.5 L   Hematocrit 45.2  --   --   --    < > 45.2 42.1   Platelets 231  --   --   --    < > 235 219   Sodium 141   < > 138 140   < > 142 143   Potassium 4.4   < > 4.0 3.8   < > 3.4 L 4.5   BUN 12   < > 13.2 9.4   < > 11 9   Creatinine 1.0   < > 1.12 1.14   < > 1.1 1.3   eGFR if  >60.0  --   --   --   --   --   --    eGFR   --    < > 88 L 78 L  --   --   --    eGFR if non  >60.0  --   --   --   --   --   --     < > = values in this interval not displayed.       Recent Labs   Lab 07/11/21  1627 10/07/21  1645 02/11/22  1619 05/31/22  1125 10/10/23  1749 04/15/24  1151 06/08/24 2024 06/28/24  0905   Levetiracetam Lvl <1.0 13.2 36.8 21.7 13.8  --   --   --    Lacosamide  --   --   --   --   --  12.8 H 11.4 H 4.5   Zonisamide  --   --   --  2.6 L  --  <1.0 L 3.8 L <1.0 L   Phenytoin Lvl <0.1 L  --   --   --   --   --   --   --         IMAGING:  Recent imaging is personally reviewed with the patient.    None  in the system.    PAST MEDICAL HISTORY:   Active Ambulatory Problems     Diagnosis Date Noted    Generalized epilepsy, intractable 10/07/2021    Poor compliance with medication 10/07/2021    Headache 10/10/2023    Intermittent chest pain 10/11/2023    Paroxysmal SVT (supraventricular tachycardia) 10/11/2023    Cluster headache, not intractable 09/12/2023    Decreased ROM of neck 11/02/2023    Muscle tone increased 11/02/2023    Decreased strength 11/02/2023    Depression 07/01/2024     Resolved Ambulatory Problems     Diagnosis Date Noted    No Resolved Ambulatory Problems     Past Medical History:   Diagnosis Date    Seizures         PAST SURGICAL HISTORY: No past surgical history on file.     ALLERGIES: Patient has no known allergies.   CURRENT MEDICATIONS:   Current Outpatient Medications   Medication Sig Dispense Refill    cloBAZam (ONFI) 20 mg Tab Take 1 tablet (20 mg total) by mouth nightly. 90 tablet 1    ergocalciferol (ERGOCALCIFEROL) 50,000 unit Cap Take 1 capsule (50,000 Units total) by mouth every 7 days. (Take 1 capsule one time per week for the next 8 weeks) 8 capsule 0    hydrALAZINE (APRESOLINE) 50 MG tablet TAKE 1 TABLET BY MOUTH TWICE DAILY WITH FOOD AS NEEDED FOR BLOOD PRESSURE      lacosamide (VIMPAT) 200 mg Tab tablet Take 1 tablet (200 mg total) by mouth 2 (two) times a day. 180 tablet 1    sertraline (ZOLOFT) 100 MG tablet Take 1 tablet (100 mg total) by mouth every evening. 90 tablet 3    tiZANidine (ZANAFLEX) 2 MG tablet Take 1 tablet (2 mg total) by mouth nightly as needed (headache and/or muscle spasms). 30 tablet 5    verapamiL (VERELAN) 180 MG C24P Take 1 capsule (180 mg total) by mouth once daily. 30 capsule 5    zonisamide (ZONEGRAN) 100 MG Cap Take 2 capsules (200 mg total) by mouth 2 (two) times daily. 360 capsule 3     No current facility-administered medications for this visit.        FAMILY HISTORY: No family history on file.      SOCIAL HISTORY:   Social History     Socioeconomic  History    Marital status: Single   Tobacco Use    Smoking status: Every Day     Current packs/day: 1.00     Types: Cigarettes    Smokeless tobacco: Never   Substance and Sexual Activity    Alcohol use: Yes     Comment: every now and then    Drug use: No      Questions and concerns raised by the patient and family/care-giver(s) were addressed and they indicated understanding of everything discussed and agreed to plans as above.    Maria R Blankenship PA-C   Neurology-Epilepsy  [facility]    Collaborating physician, Dr. Chase Núñez, was available during today's encounter.     I spent approximately 20 minutes on the day of this encounter preparing to see the patient, obtaining and reviewing history and results, performing a medically appropriate exam, counseling and educating the patient/family/caregiver, documenting clinical information, coordinating care, and ordering medications, tests, procedures, and referrals.

## 2024-07-05 NOTE — PATIENT INSTRUCTIONS
You came to Epilepsy Clinic because of your seizure disorder.  Please continue the same medications at the same dose.     I would also like you to add a medication called clobazam, please take 1 tablet nightly before bed.     Do not miss any doses of medication. If a dose of medication is missed, take it as soon as it is remembered even if that means doubling up on the dose. Please get a lab test to check out the blood level of medication.     Get regular sleep. Go to sleep at the same time and wake up at the same time every day. People with epilepsy require more sleep than people without epilepsy.  Sleeping 10-12 hours a day can be normal for a person with epilepsy.  Every seizure makes it harder to prevent the next seizure. Epilepsy is associated with SUDEP, or sudden unexpected death in epilepsy.  The risk is significantly higher if convulsive seizures are not well controlled. For more information, check out these websites: https://www.epilepsy.com/, https://www.epilepsyallianceamerica.org/, www.[place]-epilepsy.org, www.womenandepilepsy.org.  If you are interested in meeting other individuals in our epilepsy community, please reach out to the [facility] [place] ([phone], [phone], [email or web address]).  They organize many informative and fun activities in the region.  They can provide you invaluable information on how to get access to resources available for patients living with epilepsy as well as a rich community of like-minded individuals who are all learning to cope with the same issues.  It is very important to remember, you are not alone.     Per [place] law, no episodes of loss of consciousness for 6 months before driving.  Avoid dangerous situations.  For example, no baths/pools alone, no heights, no power tools.  Wear a bike helmet.  If breakthrough seizures occur that are different in character, frequency, or duration from normal episodes, please patient portal me or call the  office and we will decide the next steps. If multiple seizures occur in a row without return back to baseline, 911 needs to be called.     Return to clinic next week for in-person visit.  Please patient portal with any questions or concerns.    Maria R Blankenship PA-C   Neurology-Epilepsy  [facility]

## 2024-07-18 ENCOUNTER — PATIENT MESSAGE (OUTPATIENT)
Dept: NEUROLOGY | Facility: CLINIC | Age: 54
End: 2024-07-18

## 2024-07-18 ENCOUNTER — OFFICE VISIT (OUTPATIENT)
Dept: NEUROLOGY | Facility: CLINIC | Age: 54
End: 2024-07-18
Payer: MEDICAID

## 2024-07-18 ENCOUNTER — LAB VISIT (OUTPATIENT)
Dept: LAB | Facility: HOSPITAL | Age: 54
End: 2024-07-18
Payer: MEDICAID

## 2024-07-18 DIAGNOSIS — F32.A DEPRESSION, UNSPECIFIED DEPRESSION TYPE: Primary | ICD-10-CM

## 2024-07-18 DIAGNOSIS — G40.319 GENERALIZED EPILEPSY, INTRACTABLE: ICD-10-CM

## 2024-07-18 PROCEDURE — 36415 COLL VENOUS BLD VENIPUNCTURE: CPT

## 2024-07-18 PROCEDURE — 80203 DRUG SCREEN QUANT ZONISAMIDE: CPT

## 2024-07-18 PROCEDURE — 80235 DRUG ASSAY LACOSAMIDE: CPT

## 2024-07-18 PROCEDURE — 80339 ANTIEPILEPTICS NOS 1-3: CPT

## 2024-07-18 PROCEDURE — 90837 PSYTX W PT 60 MINUTES: CPT | Mod: ,,, | Performed by: SOCIAL WORKER

## 2024-07-18 NOTE — PROGRESS NOTES
Individual Psychotherapy (PhD/LCSW)    7/18/2024    Site:  [facility]         Therapeutic Intervention: Met with patient.  Outpatient - Insight oriented psychotherapy 60 min - CPT code 47076, Outpatient - Behavior modifying psychotherapy 60 min - CPT code 47170, and Outpatient - Supportive psychotherapy 60 min - CPT Code 94804    Chief complaint/reason for encounter: depression     Interval history and content of current session:   Patient reported hx of ongoing anger.  He reports a recent family reunion and he declined to attend.  Family almost insisted he attend, but he reported he did not want to bother people, especially if he has a seizures.  LCSW expressed empathy and noted that if the patient does not want to attend an event, he is allowed to politely declined.  LCSW did note there is a difference between being assertive and aggressive.  Patient admits to cursing when dealing with a variety of situations.     LCSW reviewed the following : Uncontrolled anger can cause problems in a wide range of areas of your life. It may cause conflicts with family,friends, or colleagues, and in extreme situations it can lead to problems with the law.  But some of the other problem effects of anger may be harder to spot. Often people  who have a problem with anger feel guilty or disappointed with their behaviour, or suffer from low self-esteem, anxiety, or depression.    LCSW reviewed anger iceberg in session and provided handouts.     Treatment plan:  Target symptoms: depression, mood swings, adjustment  Why chosen therapy is appropriate versus another modality: relevant to diagnosis  Outcome monitoring methods: self-report, observation  Therapeutic intervention type: insight oriented psychotherapy, behavior modifying psychotherapy, supportive psychotherapy    Risk parameters:  Patient reports no suicidal ideation  Patient reports no homicidal ideation  Patient reports no self-injurious behavior  Patient reports no  violent behavior    Verbal deficits: None    Patient's response to intervention:  The patient's response to intervention is guarded.    Progress toward goals and other mental status changes:  The patient's progress toward goals is fair .    Diagnosis:     ICD-10-CM ICD-9-CM   1. Depression, unspecified depression type  F32.A 311       Plan:  individual psychotherapy    Return to clinic: 1 month    Length of Service (minutes):  70

## 2024-07-20 LAB
LACOSAMIDE: 14.2 MCG/ML (ref 1–10)
ZONISAMIDE SERPL-MCNC: 3.8 MCG/ML (ref 10–40)

## 2024-07-23 LAB
CLOBAZAM SERPL-MCNC: 427 NG/ML (ref 30–300)
NORCLOBAZAM SERPL-MCNC: 1520 NG/ML (ref 300–3000)

## 2024-07-30 ENCOUNTER — TELEPHONE (OUTPATIENT)
Dept: NEUROLOGY | Facility: CLINIC | Age: 54
End: 2024-07-30
Payer: MEDICAID

## 2024-07-30 NOTE — TELEPHONE ENCOUNTER
Called patient to schedule virtual follow up appointment with Es   Pt ambulatory to bathroom to attempt urine sample, no other complaints or change in condition

## 2024-08-09 ENCOUNTER — TELEPHONE (OUTPATIENT)
Dept: NEUROLOGY | Facility: CLINIC | Age: 54
End: 2024-08-09
Payer: MEDICAID

## 2024-08-13 ENCOUNTER — OFFICE VISIT (OUTPATIENT)
Dept: NEUROLOGY | Facility: CLINIC | Age: 54
End: 2024-08-13
Payer: MEDICAID

## 2024-08-13 DIAGNOSIS — F33.1 MODERATE EPISODE OF RECURRENT MAJOR DEPRESSIVE DISORDER: Primary | ICD-10-CM

## 2024-08-13 PROCEDURE — 90837 PSYTX W PT 60 MINUTES: CPT | Mod: ,,, | Performed by: SOCIAL WORKER

## 2024-08-13 NOTE — PROGRESS NOTES
"Individual Psychotherapy (PhD/LCSW)    8/13/2024    Site:  [facility]         Therapeutic Intervention: Met with patient.  Outpatient - Insight oriented psychotherapy 60 min - CPT code 11034, Outpatient - Behavior modifying psychotherapy 60 min - CPT code 83393, and Outpatient - Supportive psychotherapy 60 min - CPT Code 50079    Chief complaint/reason for encounter: depression and anger     Interval history and content of current session:   Patient began session by reporting he wanted to come to this visit in order to address anger with family.   They hide the fact that his mother has medical issues or appointments due to his acting out.   He notes that he indeed acts out due to his close relationship with his mother.     He recalls anger iceberg and other worksheets that LCSW have provided him.  He reports he will review them and family will ask what he is doing.     He does not like when friends or family recall his issues or actions rleated to seizures ( blacking ou, urinating on self, aggressive bhaior"   He responds with anger and cursing.  LCSW asked patient to try assertive communication.   He admits to cursing and even cursing at the PA when they first reviewed his chart.  LCSW again recommended assertive communication in order to have his needs met.     Provided following information: epilepsy comic books for grandchildren.   Recognizing anger in self worksheet ( part of relationship or couples ), anger coping skills information sheet     Patient was insightful but also presented with increase in depression.  Blunted and sad facial expressions and decrease in energy.     Treatment plan:  Target symptoms: recurrent depression  Why chosen therapy is appropriate versus another modality: relevant to diagnosis, patient responds to this modality, evidence based practice  Outcome monitoring methods: self-report, observation  Therapeutic intervention type: insight oriented psychotherapy, behavior modifying " psychotherapy, supportive psychotherapy    Risk parameters:  Patient reports no suicidal ideation  Patient reports no homicidal ideation  Patient reports no self-injurious behavior  Patient reports no violent behavior    Verbal deficits: None    Patient's response to intervention:  The patient's response to intervention is guarded.    Progress toward goals and other mental status changes:  The patient's progress toward goals is fair .    Diagnosis:     ICD-10-CM ICD-9-CM   1. Moderate episode of recurrent major depressive disorder  F33.1 296.32       Plan:  individual psychotherapy    Return to clinic: 1 month    Length of Service (minutes): 60

## 2024-08-22 DIAGNOSIS — G40.319 GENERALIZED EPILEPSY, INTRACTABLE: ICD-10-CM

## 2024-08-22 RX ORDER — LACOSAMIDE 200 MG/1
200 TABLET ORAL 2 TIMES DAILY
Qty: 180 TABLET | Refills: 1 | Status: SHIPPED | OUTPATIENT
Start: 2024-08-22 | End: 2025-02-18

## 2024-08-22 NOTE — TELEPHONE ENCOUNTER
----- Message from Anna Gamble sent at 8/22/2024  4:05 PM CDT -----  Regarding: Rx Refill  Contact: [phone]  NANI REECE calling regarding Refills  (message) for #  lacosamide (VIMPAT) 200 mg Tab tablet    Pt only has enough for tomorrow and needs medication for weekend and on pls advise and send medication asap to pharmacy        [facility] - [place] - [address]  [facility] [place] [address]  Phone: [phone] Fax: [phone]

## 2024-08-23 ENCOUNTER — TELEPHONE (OUTPATIENT)
Dept: NEUROLOGY | Facility: CLINIC | Age: 54
End: 2024-08-23
Payer: MEDICAID

## 2024-08-26 ENCOUNTER — OFFICE VISIT (OUTPATIENT)
Dept: NEUROLOGY | Facility: CLINIC | Age: 54
End: 2024-08-26
Payer: MEDICAID

## 2024-08-26 ENCOUNTER — TELEPHONE (OUTPATIENT)
Dept: NEUROLOGY | Facility: CLINIC | Age: 54
End: 2024-08-26
Payer: MEDICAID

## 2024-08-26 DIAGNOSIS — R51.9 NONINTRACTABLE HEADACHE, UNSPECIFIED CHRONICITY PATTERN, UNSPECIFIED HEADACHE TYPE: ICD-10-CM

## 2024-08-26 DIAGNOSIS — F33.1 MODERATE EPISODE OF RECURRENT MAJOR DEPRESSIVE DISORDER: ICD-10-CM

## 2024-08-26 DIAGNOSIS — G40.319 GENERALIZED EPILEPSY, INTRACTABLE: Primary | ICD-10-CM

## 2024-08-26 DIAGNOSIS — Z91.89 AT RISK FOR MEDICATION NONADHERENCE: ICD-10-CM

## 2024-08-26 PROCEDURE — 99215 OFFICE O/P EST HI 40 MIN: CPT | Mod: 95,,,

## 2024-08-26 PROCEDURE — 99417 PROLNG OP E/M EACH 15 MIN: CPT | Mod: 95,,,

## 2024-08-26 RX ORDER — ZONISAMIDE 100 MG/1
300 CAPSULE ORAL 2 TIMES DAILY
Qty: 540 CAPSULE | Refills: 3 | Status: SHIPPED | OUTPATIENT
Start: 2024-08-26 | End: 2025-08-26

## 2024-08-26 NOTE — PROGRESS NOTES
"Established Patient - Audio Only Telehealth Visit     The patient location is: home  The chief complaint leading to consultation is: seizures  Visit type: Virtual visit with audio only (telephone)  Total time spent with patient: 46 min    The reason for the audio only service rather than synchronous audio and video virtual visit was related to technical difficulties or patient preference/necessity.     Each patient to whom I provide medical services by telemedicine is:  (1) informed of the relationship between the physician and patient and the respective role of any other health care provider with respect to management of the patient; and (2) notified that they may decline to receive medical services by telemedicine and may withdraw from such care at any time. Patient verbally consented to receive this service via voice-only telephone call.       Interval Events/ROS 8/26/2024:  "2 bad seizures this month" resulting in ED visit. No identifiable trigger, taking medications every day though often will feel dizzy/drunk/have trouble walking after taking morning doses, particularly after taking verapamil as well. In thoroughly reviewing patient's medication while he had all pill bottles in front of him, discovered pt was accidentally taking lacosamide 400mg BID (prescribed 200mg BID). (Pt is a poor historian, difficulty w/ health literacy though his mother and daughters try to help him, ?depending on how long he has been taking wrong lacosamide dose, could have also resulted in missed doses as he would have been running out of medication early).     Current ASM/SEs: lacosamide 400mg BID, zonisamide 200mg BID, clobazam 20mg qhs; SE dizziness/trouble walking  Breakthrough seizures/events: last sz was 8/22/2024  Driving: no  Sleep: fair  Mood: stable, sertraline 100mg qd (increased 7/3/2024)    Otherwise, no fever, no cold symptoms, no changes in vision, no new weakness, no chest pain, no shortness of breath, no nausea, no " vomiting, no diarrhea, no constipation.    Recent Labs   Lab 10/07/21  1645 02/11/22  1619 05/31/22  1125 10/10/23  1749 04/15/24  1151 06/08/24  2024 06/28/24  0905 07/18/24  1123   Levetiracetam Lvl 13.2 36.8 21.7 13.8  --   --   --   --    Lacosamide  --   --   --   --  12.8 H 11.4 H 4.5 14.2 H   Clobazam  --   --   --   --   --   --   --  427.0 H   Desmethylclobazam  --   --   --   --   --   --   --  1520.0   Zonisamide  --   --  2.6 L  --  <1.0 L 3.8 L <1.0 L 3.8 L       Assessment and plan:    - Thoroughly reviewed the following medication adjustments with pt:  Decrease lacosamide to 200mg BID   Increase zonisamide to 300mg BID   Continue clobazam 20mg qhs   Hold off on taking verapamil for now given reported side effects + f/u w/ Inna George PA-C for further evaluation/management   - levels/labs next visit   - close follow up in 6 weeks or sooner with issues     Patient is comfortable with plan. All questions and concerns are addressed at this time.     Maria R Blankenship PA-C   Neurology-Epilepsy  [facility]    Collaborating physician, Dr. Chase Núñez, was available during today's encounter.     I spent approximately 70 minutes on the day of this encounter preparing to see the patient, obtaining and reviewing history and results, performing a medically appropriate exam, counseling and educating the patient/family/caregiver, documenting clinical information, coordinating care, and ordering medications, tests, procedures, and referrals.                       This service was not originating from a related E/M service provided within the previous 7 days nor will  to an E/M service or procedure within the next 24 hours or my soonest available appointment.  Prevailing standard of care was able to be met in this audio-only visit.

## 2024-08-26 NOTE — TELEPHONE ENCOUNTER
Called patient to schedule 6 week follow up visit with Maria R, appointment scheduled for 10/7/24 at 1:00 audio only appointment).

## 2024-08-30 ENCOUNTER — TELEPHONE (OUTPATIENT)
Dept: NEUROLOGY | Facility: CLINIC | Age: 54
End: 2024-08-30
Payer: MEDICAID

## 2024-08-30 NOTE — TELEPHONE ENCOUNTER
Spoke to patient informed him Inna doesn't schedule audio calls. He has a in person appt scheduled.

## 2024-09-03 ENCOUNTER — PATIENT MESSAGE (OUTPATIENT)
Dept: NEUROLOGY | Facility: CLINIC | Age: 54
End: 2024-09-03
Payer: MEDICAID

## 2024-09-10 ENCOUNTER — OFFICE VISIT (OUTPATIENT)
Dept: NEUROLOGY | Facility: CLINIC | Age: 54
End: 2024-09-10
Payer: MEDICAID

## 2024-09-10 DIAGNOSIS — G40.319 GENERALIZED EPILEPSY, INTRACTABLE: ICD-10-CM

## 2024-09-10 DIAGNOSIS — F33.1 MODERATE EPISODE OF RECURRENT MAJOR DEPRESSIVE DISORDER: ICD-10-CM

## 2024-09-10 DIAGNOSIS — R51.9 NONINTRACTABLE HEADACHE, UNSPECIFIED CHRONICITY PATTERN, UNSPECIFIED HEADACHE TYPE: Primary | ICD-10-CM

## 2024-09-10 PROCEDURE — 90837 PSYTX W PT 60 MINUTES: CPT | Mod: ,,, | Performed by: SOCIAL WORKER

## 2024-09-10 RX ORDER — CLOBAZAM 20 MG/1
20 TABLET ORAL NIGHTLY
Qty: 30 TABLET | Refills: 5 | Status: SHIPPED | OUTPATIENT
Start: 2024-09-10 | End: 2025-03-09

## 2024-09-10 RX ORDER — CLOBAZAM 20 MG/1
20 TABLET ORAL NIGHTLY
Qty: 30 TABLET | Refills: 5 | Status: SHIPPED | OUTPATIENT
Start: 2024-09-10 | End: 2024-09-10 | Stop reason: SDUPTHER

## 2024-09-10 NOTE — PROGRESS NOTES
Individual Psychotherapy (PhD/KEDARW)    9/10/2024    Site:  [facility]         Therapeutic Intervention: Met with patient.  Outpatient - Insight oriented psychotherapy 60 min - CPT code 08422, Outpatient - Behavior modifying psychotherapy 60 min - CPT code 22967, and Outpatient - Supportive psychotherapy 60 min - CPT Code 50352    Chief complaint/reason for encounter: depression, mood swings, and anger     Interval history and content of current session:   Patient began session by reporting he had been not doing well.  He reports he had a seizure within the past month.   He acknowledges feelings of depression and anger, but also presented with a decrease in overall depression.   Patient did acknowledge coming to talk to LCSW and noted that it does help him.  He also noted other times, he is not sure if it helps but he understands the importance of expressing his stress.  He did state his neurologist acknowledged that patient at times will have less headaches and it can be attributed to talk therapy.     Fina reported he continues to review handouts [facility].  Discussed anger iceberg and he notes that he will look at the emotions and mental states under anger and he will work on one of those for that day.  LCSW noted that it is good to have insight into this for the person working on it, as well as, for family members.     LCSW also reviewed the following CBT information sheets: identifying emotions, and anger discussion.     Treatment plan:  Target symptoms: depression, recurrent depression, mood swings  Why chosen therapy is appropriate versus another modality: relevant to diagnosis  Outcome monitoring methods: self-report, observation  Therapeutic intervention type: insight oriented psychotherapy, behavior modifying psychotherapy    Risk parameters:  Patient reports no suicidal ideation  Patient reports no homicidal ideation  Patient reports no self-injurious behavior  Patient reports no violent  behavior    Verbal deficits: None    Patient's response to intervention:  The patient's response to intervention is accepting.    Progress toward goals and other mental status changes:  The patient's progress toward goals is fair , good.    Diagnosis:     ICD-10-CM ICD-9-CM   1. Nonintractable headache, unspecified chronicity pattern, unspecified headache type  R51.9 784.0   2. Generalized epilepsy, intractable  G40.319 345.91   3. Moderate episode of recurrent major depressive disorder  F33.1 296.32       Plan:  individual psychotherapy    Return to clinic: 6 weeks    Length of Service (minutes): 60

## 2024-10-07 ENCOUNTER — LAB VISIT (OUTPATIENT)
Dept: LAB | Facility: HOSPITAL | Age: 54
End: 2024-10-07
Payer: MEDICAID

## 2024-10-07 ENCOUNTER — OFFICE VISIT (OUTPATIENT)
Dept: NEUROLOGY | Facility: CLINIC | Age: 54
End: 2024-10-07
Payer: MEDICAID

## 2024-10-07 DIAGNOSIS — G40.319 GENERALIZED EPILEPSY, INTRACTABLE: Primary | ICD-10-CM

## 2024-10-07 DIAGNOSIS — R79.89 LOW VITAMIN D LEVEL: ICD-10-CM

## 2024-10-07 DIAGNOSIS — G40.909 SEIZURE DISORDER: ICD-10-CM

## 2024-10-07 DIAGNOSIS — R51.9 NONINTRACTABLE HEADACHE, UNSPECIFIED CHRONICITY PATTERN, UNSPECIFIED HEADACHE TYPE: ICD-10-CM

## 2024-10-07 DIAGNOSIS — Z91.89 AT RISK FOR MEDICATION NONADHERENCE: ICD-10-CM

## 2024-10-07 DIAGNOSIS — F06.30 MOOD DISORDER DUE TO MEDICAL CONDITION: ICD-10-CM

## 2024-10-07 LAB
25(OH)D3+25(OH)D2 SERPL-MCNC: 24 NG/ML (ref 30–96)
ALBUMIN SERPL BCP-MCNC: 4.3 G/DL (ref 3.5–5.2)
ALP SERPL-CCNC: 91 U/L (ref 55–135)
ALT SERPL W/O P-5'-P-CCNC: 10 U/L (ref 10–44)
ANION GAP SERPL CALC-SCNC: 11 MMOL/L (ref 8–16)
AST SERPL-CCNC: 15 U/L (ref 10–40)
BASOPHILS # BLD AUTO: 0.02 K/UL (ref 0–0.2)
BASOPHILS NFR BLD: 0.5 % (ref 0–1.9)
BILIRUB SERPL-MCNC: 0.5 MG/DL (ref 0.1–1)
BUN SERPL-MCNC: 10 MG/DL (ref 6–20)
CALCIUM SERPL-MCNC: 9.9 MG/DL (ref 8.7–10.5)
CHLORIDE SERPL-SCNC: 106 MMOL/L (ref 95–110)
CO2 SERPL-SCNC: 26 MMOL/L (ref 23–29)
CREAT SERPL-MCNC: 1.1 MG/DL (ref 0.5–1.4)
DIFFERENTIAL METHOD BLD: ABNORMAL
EOSINOPHIL # BLD AUTO: 0 K/UL (ref 0–0.5)
EOSINOPHIL NFR BLD: 1 % (ref 0–8)
ERYTHROCYTE [DISTWIDTH] IN BLOOD BY AUTOMATED COUNT: 14.4 % (ref 11.5–14.5)
EST. GFR  (NO RACE VARIABLE): >60 ML/MIN/1.73 M^2
GLUCOSE SERPL-MCNC: 82 MG/DL (ref 70–110)
HCT VFR BLD AUTO: 46.2 % (ref 40–54)
HGB BLD-MCNC: 14.3 G/DL (ref 14–18)
IMM GRANULOCYTES # BLD AUTO: 0.01 K/UL (ref 0–0.04)
IMM GRANULOCYTES NFR BLD AUTO: 0.2 % (ref 0–0.5)
LYMPHOCYTES # BLD AUTO: 2.1 K/UL (ref 1–4.8)
LYMPHOCYTES NFR BLD: 51 % (ref 18–48)
MCH RBC QN AUTO: 30.9 PG (ref 27–31)
MCHC RBC AUTO-ENTMCNC: 31 G/DL (ref 32–36)
MCV RBC AUTO: 100 FL (ref 82–98)
MONOCYTES # BLD AUTO: 0.4 K/UL (ref 0.3–1)
MONOCYTES NFR BLD: 10.9 % (ref 4–15)
NEUTROPHILS # BLD AUTO: 1.5 K/UL (ref 1.8–7.7)
NEUTROPHILS NFR BLD: 36.4 % (ref 38–73)
NRBC BLD-RTO: 0 /100 WBC
PLATELET # BLD AUTO: 224 K/UL (ref 150–450)
PMV BLD AUTO: 9.7 FL (ref 9.2–12.9)
POTASSIUM SERPL-SCNC: 4.1 MMOL/L (ref 3.5–5.1)
PROT SERPL-MCNC: 7.6 G/DL (ref 6–8.4)
RBC # BLD AUTO: 4.63 M/UL (ref 4.6–6.2)
SODIUM SERPL-SCNC: 143 MMOL/L (ref 136–145)
WBC # BLD AUTO: 4.02 K/UL (ref 3.9–12.7)

## 2024-10-07 PROCEDURE — 99214 OFFICE O/P EST MOD 30 MIN: CPT | Mod: 95,,,

## 2024-10-07 PROCEDURE — 36415 COLL VENOUS BLD VENIPUNCTURE: CPT

## 2024-10-07 PROCEDURE — 82306 VITAMIN D 25 HYDROXY: CPT

## 2024-10-07 PROCEDURE — 80053 COMPREHEN METABOLIC PANEL: CPT

## 2024-10-07 PROCEDURE — 80203 DRUG SCREEN QUANT ZONISAMIDE: CPT

## 2024-10-07 PROCEDURE — 85025 COMPLETE CBC W/AUTO DIFF WBC: CPT

## 2024-10-07 PROCEDURE — 80339 ANTIEPILEPTICS NOS 1-3: CPT

## 2024-10-07 PROCEDURE — 80235 DRUG ASSAY LACOSAMIDE: CPT

## 2024-10-07 RX ORDER — CLOBAZAM 20 MG/1
20 TABLET ORAL NIGHTLY
Qty: 30 TABLET | Refills: 5 | Status: SHIPPED | OUTPATIENT
Start: 2024-10-07 | End: 2025-04-05

## 2024-10-07 RX ORDER — LACOSAMIDE 200 MG/1
200 TABLET ORAL 2 TIMES DAILY
Qty: 180 TABLET | Refills: 1 | Status: SHIPPED | OUTPATIENT
Start: 2024-10-07 | End: 2025-04-05

## 2024-10-07 RX ORDER — SERTRALINE HYDROCHLORIDE 100 MG/1
100 TABLET, FILM COATED ORAL DAILY
Qty: 90 TABLET | Refills: 3 | Status: SHIPPED | OUTPATIENT
Start: 2024-10-07 | End: 2025-10-07

## 2024-10-07 RX ORDER — ZONISAMIDE 100 MG/1
300 CAPSULE ORAL NIGHTLY
Qty: 270 CAPSULE | Refills: 3 | Status: SHIPPED | OUTPATIENT
Start: 2024-10-07 | End: 2025-10-07

## 2024-10-07 NOTE — PROGRESS NOTES
Established Patient - Audio Only Telehealth Visit     The patient location is: home  The chief complaint leading to consultation is: seizures  Visit type: Virtual visit with audio only (telephone)  Total time spent with patient: 16 min    The reason for the audio only service rather than synchronous audio and video virtual visit was related to technical difficulties or patient preference/necessity.     Each patient to whom I provide medical services by telemedicine is:  (1) informed of the relationship between the physician and patient and the respective role of any other health care provider with respect to management of the patient; and (2) notified that they may decline to receive medical services by telemedicine and may withdraw from such care at any time. Patient verbally consented to receive this service via voice-only telephone call.       Interval Events/ROS 10/7/2024:    Current ASM/SEs: lacosamide 200mg TID (pt prefers this dosing, prescribed BID), zonisamide 300mg qhs (prescribed BID), clobazam 20mg qhs; no SE  Breakthrough seizures/events: last convulsion was 2 weeks ago at [facility]  Driving: no  Sleep: improved  Mood: stable, sertraline 100mg qd (increased 7/3/2024)    Otherwise, no fever, no cold symptoms, no changes in vision, no new weakness, no chest pain, no shortness of breath, no nausea, no vomiting, no diarrhea, no constipation.    Recent Labs   Lab 02/11/22  1619 05/31/22  1125 10/10/23  1749 04/15/24  1151 06/08/24 2024 06/28/24  0905 07/18/24  1123   Levetiracetam Lvl 36.8 21.7 13.8  --   --   --   --    Lacosamide  --   --   --  12.8 H 11.4 H 4.5 14.2 H   Clobazam  --   --   --   --   --   --  427.0 H   Desmethylclobazam  --   --   --   --   --   --  1520.0   Zonisamide  --  2.6 L  --  <1.0 L 3.8 L <1.0 L 3.8 L       Assessment and plan:    - continue medications at same doses for now  Lacosamide 200mg TID  Clobazam 20mg qhs  Zonisamide 300mg qhs   - labs/levels today -> adjustments pending  results   - hold off on taking verapamil given reported side effects + f/u w/ Inna George PA-C for further evaluation/management of headaches -> appt scheduled for 10/29  - continue visits w/ social work  - close follow up in 6-8 weeks or sooner with issues     Patient is comfortable with plan. All questions and concerns are addressed at this time.     Maria R Blankenship PA-C   Neurology-Epilepsy  [facility]    Collaborating physician, Dr. Chase Núñez, was available during today's encounter.     I spent approximately 26 minutes on the day of this encounter preparing to see the patient, obtaining and reviewing history and results, performing a medically appropriate exam, counseling and educating the patient/family/caregiver, documenting clinical information, coordinating care, and ordering medications, tests, procedures, and referrals.                       This service was not originating from a related E/M service provided within the previous 7 days nor will  to an E/M service or procedure within the next 24 hours or my soonest available appointment.  Prevailing standard of care was able to be met in this audio-only visit.

## 2024-10-07 NOTE — PATIENT INSTRUCTIONS
You came to [facility]psy [facility] because of your seizure disorder. Please continue the same medications at the same dose.     Do not miss any doses of medication. If a dose of medication is missed, take it as soon as it is remembered even if that means doubling up on the dose. Please get a lab test to check out the blood level of medication. Get regular sleep. Go to sleep at the same time and wake up at the same time every day. People with epilepsy require more sleep than people without epilepsy.  Sleeping 10-12 hours a day can be normal for a person with epilepsy.  Every seizure makes it harder to prevent the next seizure. Epilepsy is associated with SUDEP, or sudden unexpected death in epilepsy.  The risk is significantly higher if convulsive seizures are not well controlled. For more information, check out these websites: https://www.epilepsy.com/, https://www.epilepsyalliance[place].org/, www.[place]-epilepsy.org, www.womenandepilepsy.org.  If you are interested in meeting other individuals in our epilepsy community, please reach out to the [facility] [place] ([phone], [phone], info@[facility].org).  They organize many informative and fun activities in the region.  They can provide you invaluable information on how to get access to resources available for patients living with epilepsy as well as a rich community of like-minded individuals who are all learning to cope with the same issues.  It is very important to remember, you are not alone.     Per [place] law, no episodes of loss of consciousness for 6 months before driving.  Avoid dangerous situations.  For example, no baths/pools alone, no heights, no power tools.  Wear a bike helmet.  If breakthrough seizures occur that are different in character, frequency, or duration from normal episodes, please patient portal me or call the office and we will decide the next steps. If multiple seizures occur in a row without return back to baseline,  911 needs to be called.     Return to clinic in 2 months or sooner with issues.  Please patient portal with any questions or concerns.    Maria R Blankenship PA-C   Neurology-Epilepsy  [facility]

## 2024-10-08 LAB — ZONISAMIDE SERPL-MCNC: <1 MCG/ML (ref 10–40)

## 2024-10-09 LAB
CLOBAZAM SERPL-MCNC: <10 NG/ML (ref 30–300)
LACOSAMIDE: 13.6 MCG/ML (ref 1–10)
NORCLOBAZAM SERPL-MCNC: 165 NG/ML (ref 300–3000)

## 2024-10-15 ENCOUNTER — OFFICE VISIT (OUTPATIENT)
Dept: NEUROLOGY | Facility: CLINIC | Age: 54
End: 2024-10-15
Payer: MEDICAID

## 2024-10-15 DIAGNOSIS — G40.319 GENERALIZED EPILEPSY, INTRACTABLE: Primary | ICD-10-CM

## 2024-10-15 DIAGNOSIS — R51.9 NONINTRACTABLE HEADACHE, UNSPECIFIED CHRONICITY PATTERN, UNSPECIFIED HEADACHE TYPE: ICD-10-CM

## 2024-10-15 PROCEDURE — 90837 PSYTX W PT 60 MINUTES: CPT | Mod: ,,, | Performed by: SOCIAL WORKER

## 2024-10-16 NOTE — PROGRESS NOTES
Individual Psychotherapy (PhD/LCSW)    10/15/2024    Site:  [facility]         Therapeutic Intervention: Met with patient.  Outpatient - Insight oriented psychotherapy 60 min - CPT code 70051, Outpatient - Behavior modifying psychotherapy 60 min - CPT code 88523, and Outpatient - Supportive psychotherapy 60 min - CPT Code 20612    Chief complaint/reason for encounter: depression and interpersonal     Interval history and content of current session:   Patient began session by discussing ongoing issues related to depression and anger.  He reports he has continued to make progress in regards to his symptoms.   He also noted improvement in regards to his relationship with his father.     He recalls how talking to LCSW allows him to talk about ideas, symptoms, relationships.  He acknowledges he does not necessarily want someone telling him what to do, but someone to listen to him.  He has told his father that talking to this LCSW helps him with over all stress.     He reports hx of headaches and noted he needed to take headache medication.     Patient also mentioned disability and LCSW noted he will send a message to the SSI/ SSDI department to reach out to him.    Patient continues to make progress and gain insight into his issues related to anger.   He has also reported improvement in regards to relationship with father.   LCSW will continue to provide support and empathy.  Continue working on CBT And solution focused techniques.     Treatment plan:  Target symptoms: depression, recurrent depression, mood swings, adjustment  Why chosen therapy is appropriate versus another modality: relevant to diagnosis, patient responds to this modality, evidence based practice  Outcome monitoring methods: self-report, observation  Therapeutic intervention type: insight oriented psychotherapy, supportive psychotherapy    Risk parameters:  Patient reports no suicidal ideation  Patient reports no homicidal ideation  Patient  reports no self-injurious behavior  Patient reports no violent behavior    Verbal deficits: None    Patient's response to intervention:  The patient's response to intervention is guarded.    Progress toward goals and other mental status changes:  The patient's progress toward goals is fair , good.    Diagnosis:     ICD-10-CM ICD-9-CM   1. Generalized epilepsy, intractable  G40.319 345.91   2. Nonintractable headache, unspecified chronicity pattern, unspecified headache type  R51.9 784.0       Plan:  individual psychotherapy    Return to clinic: 6 weeks    Length of Service (minutes):  70

## 2024-10-25 ENCOUNTER — PATIENT MESSAGE (OUTPATIENT)
Dept: RESEARCH | Facility: HOSPITAL | Age: 54
End: 2024-10-25
Payer: MEDICAID

## 2024-10-29 ENCOUNTER — TELEPHONE (OUTPATIENT)
Dept: NEUROLOGY | Facility: CLINIC | Age: 54
End: 2024-10-29
Payer: MEDICAID

## 2024-11-05 ENCOUNTER — SOCIAL WORK (OUTPATIENT)
Dept: NEUROLOGY | Facility: CLINIC | Age: 54
End: 2024-11-05

## 2024-11-05 ENCOUNTER — OFFICE VISIT (OUTPATIENT)
Dept: NEUROLOGY | Facility: CLINIC | Age: 54
End: 2024-11-05
Payer: MEDICAID

## 2024-11-05 VITALS
WEIGHT: 166.69 LBS | SYSTOLIC BLOOD PRESSURE: 130 MMHG | HEART RATE: 93 BPM | BODY MASS INDEX: 22.09 KG/M2 | DIASTOLIC BLOOD PRESSURE: 87 MMHG | HEIGHT: 73 IN

## 2024-11-05 DIAGNOSIS — G43.709 CHRONIC MIGRAINE WITHOUT AURA WITHOUT STATUS MIGRAINOSUS, NOT INTRACTABLE: Primary | ICD-10-CM

## 2024-11-05 DIAGNOSIS — G44.40 MEDICATION OVERUSE HEADACHE: ICD-10-CM

## 2024-11-05 PROCEDURE — 99213 OFFICE O/P EST LOW 20 MIN: CPT | Mod: PBBFAC | Performed by: PHYSICIAN ASSISTANT

## 2024-11-05 PROCEDURE — 1159F MED LIST DOCD IN RCRD: CPT | Mod: CPTII,,, | Performed by: PHYSICIAN ASSISTANT

## 2024-11-05 PROCEDURE — 99215 OFFICE O/P EST HI 40 MIN: CPT | Mod: S$PBB,,, | Performed by: PHYSICIAN ASSISTANT

## 2024-11-05 PROCEDURE — 1160F RVW MEDS BY RX/DR IN RCRD: CPT | Mod: CPTII,,, | Performed by: PHYSICIAN ASSISTANT

## 2024-11-05 PROCEDURE — 3079F DIAST BP 80-89 MM HG: CPT | Mod: CPTII,,, | Performed by: PHYSICIAN ASSISTANT

## 2024-11-05 PROCEDURE — 3075F SYST BP GE 130 - 139MM HG: CPT | Mod: CPTII,,, | Performed by: PHYSICIAN ASSISTANT

## 2024-11-05 PROCEDURE — 3008F BODY MASS INDEX DOCD: CPT | Mod: CPTII,,, | Performed by: PHYSICIAN ASSISTANT

## 2024-11-05 PROCEDURE — 99999 PR PBB SHADOW E&M-EST. PATIENT-LVL III: CPT | Mod: PBBFAC,,, | Performed by: PHYSICIAN ASSISTANT

## 2024-11-05 PROCEDURE — G2211 COMPLEX E/M VISIT ADD ON: HCPCS | Mod: S$PBB,,, | Performed by: PHYSICIAN ASSISTANT

## 2024-11-05 RX ORDER — ATOGEPANT 60 MG/1
60 TABLET ORAL DAILY
Qty: 30 TABLET | Refills: 5 | Status: SHIPPED | OUTPATIENT
Start: 2024-11-05 | End: 2025-05-04

## 2024-11-05 RX ORDER — UBROGEPANT 50 MG/1
TABLET ORAL
Qty: 16 TABLET | Refills: 5 | Status: CANCELLED | OUTPATIENT
Start: 2024-11-05

## 2024-11-05 RX ORDER — UBROGEPANT 100 MG/1
TABLET ORAL
Qty: 16 TABLET | Refills: 5 | Status: SHIPPED | OUTPATIENT
Start: 2024-11-05

## 2024-11-05 NOTE — PROGRESS NOTES
Established Patient   SUBJECTIVE:  Patient ID: Irvin Meza   Chief Complaint: Headache    History of Present Illness:  Irvin Meza is a 54 y.o. male  with ha's, epilepsy, paroxysmal SVT, elevated BP who presents to clinic alone for follow-up of headaches.       11/05/2024 - Interval History:  Pt states he stopped taking verapamil as he felt it was causing ha's. He no longer takes tylenol but is taking ibuprofen daily even on days without ha's. He feels ha's have improved and are not occurring daily. But occur 3-4 days a week. Pain is thumping and pulsating and located in bilateral temples and bilateral-occipitalis regions with associated photophobia, phonophobia, nausea, vomiting, right sided rhinorrhea, and osmophobia. Denies all other ROS.   Discussed options w/ pt. Pt also defers PT. Discussed injectables including botox, NB's, and cgrp inhibitors but pt defers any injection options. Discussed potential interactions w/ anti-epileptics, anti-depressants, anti-hypertensives, and cgrp modulating medications. Pt wishes to avoid medications w/ potential interactions at this time.   Plan: start supplements, d/c ibuprofen and remain off otc's,   Start quilipta 60mg/d. Try ubrelvy 100mg prn. Advised continued bp monitoring and close f/ups w/ pcp. Advised attending referrals to psych, ophtho, and sleep medicine. Advised continued trigger mgmt and tracking.     02/20/2024 - Interval History:  Pt reports ha's have improved since last visit. He no longer experiences severe ha's that require him to sit up in bed or go to the ED. Ha's are also not daily. He has been working on trigger mgmt such as decreasing caffeine intake and eating more fruits. He did not track ha's as requested last visit. But did notice that bright lights and tv screens can trigger ha's. HA's can last up to 3 days. Due to his memory, he is unsure of his current frequency. He is sure ha's are not daily, maybe up to 3 days a week. Last HA was  "yesterday. Has been taking verapamil daily. " I take it every day with my food, dont miss a dose, I can remmeber that." Is unsure if he tried nurtec but does not wish to continue w/ it as "I'm already taking 6 pills" and does not wish to add to it. Takes tylenol as an abortive which helps and denies taking it daily or overusing it. But then also states he takes it "like m&ms". Upon further questioning, he denies taking it  more than 3 days in a week, more than 2 tablets at a time, more than once in a day. He also attended a PT appt and refuses to attend further as he was triggered by the perfume smells, being overheated, and walking up stairs. States he will do the stretches at home. Has been following w/ neurology for other neuro conditions which he feels is helpgin. Has not seen other referrals yet (sleep, ophtho, or psych). Also reports his bp's have remained elevated and his pcp is monitoring. Of note, he was also started on zonisamide for his seizures.   Plan: per pt wishes will increase verapamil only and rtc prn. Advised pt to limit tylenol use to avoid overuse/se's and to consider other abortives. Advised bp monitoring and continued close f/ups w/ pcp. Advised attending referrals to psych, ophtho, and sleep medicine. Advised continued trigger mgmt. Advised tracking.     Recommendations made at last Office Visit on 10/31/23:  - Discussed symptoms appear to be consistent with migraines, DDX includes cluster ha's, TACs, discussed treatment options and patient agreed with the following plan:  - pt to begin tracking ha's as he was unable to describe many features of ha's today, pt verbalizes that this will be challenging for him 2/2 memory, provided pt w/ printed ha diary's he can place at bedside/fridge, etc to assist. Also provided summary of plan on AVS given to pt today   - ppx - start verapamil 120mg/d for dual purpose of ha's   - abortive - start nurtec prn, d/c all otcs, maxalt, mobic  - BP - elevated " "today, advised pt to monitor and inform pcp of readings, ED precautions discussed, states he takes hydralazine only for bp, will start verapamil for dual purpose, avodi triptans  - referral to ophtho 2/2 pt's chronic visual complaint of not being able to see anything but shapes, states he has previously seen an eye doc for this but unable to view notes, discussed ED precuations  - referral to neuro for other neuro complaints including weakness on exam, visual complaints, memory complaints, "slurred speech"  - referral to sleep med for ?binta. Endorses loud snoring, frequent night time awakenings, and awaken w/ ha  - referral to PT 2/2 decreased neck ROM and muscle tension  - referral to psych for pt reported stress which triggers ha's  - risks, benefits, and potential side effects of verapamil, nurtec discussed   - alternative treatment options offered   - importance of healthy diet, regular exercise and sleep hygiene in the treatment of headaches    - Start tracking headaches via Migraine [facility] naomie on phone   - RTC in 2-3 mo     Treatments Tried:  Verapamil - helps  Zonisamide 300mg nightly - currently takes for epilepsy  Vimpat- currently takes for epilepsy  Verapamil   Nurtec - unsure if he tried this or if it helped  Maxalt 10mg - recalls trying this, did not help  Triptans -a void 2/2 elevated bp and h/o svt  Mobic 7.5mg bid for the last 1-2 wks - not helping per pt  Aspirin - helped in the past  Tylenol  - helped in the past    Current Medications:    Current Outpatient Medications:     cloBAZam (ONFI) 20 mg Tab, Take 1 tablet (20 mg total) by mouth nightly., Disp: 30 tablet, Rfl: 5    ergocalciferol (ERGOCALCIFEROL) 50,000 unit Cap, Take 1 capsule (50,000 Units total) by mouth every 7 days. (Take 1 capsule one time per week for the next 8 weeks), Disp: 8 capsule, Rfl: 0    hydrALAZINE (APRESOLINE) 50 MG tablet, TAKE 1 TABLET BY MOUTH TWICE DAILY WITH FOOD AS NEEDED FOR BLOOD PRESSURE, Disp: , Rfl:     " "lacosamide (VIMPAT) 200 mg Tab tablet, Take 1 tablet (200 mg total) by mouth 2 (two) times a day., Disp: 180 tablet, Rfl: 1    sertraline (ZOLOFT) 100 MG tablet, Take 1 tablet (100 mg total) by mouth once daily., Disp: 90 tablet, Rfl: 3    zonisamide (ZONEGRAN) 100 MG Cap, Take 3 capsules (300 mg total) by mouth every evening., Disp: 270 capsule, Rfl: 3    atogepant (QULIPTA) 60 mg Tab, Take 1 tablet (60 mg total) by mouth once daily., Disp: 30 tablet, Rfl: 5    ubrogepant (UBRELVY) 100 mg tablet, Take 1 tablet by mouth at the onset of a headache. May repeat based on response and tolerability after more than 2 hours if needed. Do not take more than 200mg in a 24 hour span., Disp: 16 tablet, Rfl: 5    Review of Systems - as per HPI, otherwise a balanced 10 systems review is negative.    OBJECTIVE:  Vitals:  /87   Pulse 93   Ht 6' 1" (1.854 m)   Wt 75.6 kg (166 lb 10.7 oz)   BMI 21.99 kg/m²      Physical Exam:  Constitutional: he appears well-developed and well-nourished. he is well groomed. NAD   HENT:    Head: Normocephalic and atraumatic  Eyes: Conjunctivae and EOM are normal  Musculoskeletal: Normal range of motion. No joint stiffness.   Skin: Skin is warm and dry.  Psychiatric: Mood and affect are normal    Neuro: Patient is alert and oriented to person, place, and time. Language is intact and fluent. Speech is clear and fluent. Recent and remote memory are intact.  Normal attention and concentration.  Facial movement is symmetric. Moves all 4 extremities against gravity. Gait and station normal.  Cranial Nerves II through XII without focal deficit.     Review of Data:   Notes from neuro reviewed   Labs:  Lab Visit on 10/07/2024   Component Date Value Ref Range Status    Lacosamide 10/07/2024 13.6 (H)  1.0 - 10.0 mcg/mL Final    Zonisamide 10/07/2024 <1.0 (L)  10 - 40 mcg/mL Final    Clobazam 10/07/2024 <10 (L)  30 - 300 ng/mL Final    Desmethylclobazam 10/07/2024 165.0 (L)  300 - 3000 ng/mL Final    " Vit D, 25-Hydroxy 10/07/2024 24 (L)  30 - 96 ng/mL Final    WBC 10/07/2024 4.02  3.90 - 12.70 K/uL Final    RBC 10/07/2024 4.63  4.60 - 6.20 M/uL Final    Hemoglobin 10/07/2024 14.3  14.0 - 18.0 g/dL Final    Hematocrit 10/07/2024 46.2  40.0 - 54.0 % Final    MCV 10/07/2024 100 (H)  82 - 98 fL Final    MCH 10/07/2024 30.9  27.0 - 31.0 pg Final    MCHC 10/07/2024 31.0 (L)  32.0 - 36.0 g/dL Final    RDW 10/07/2024 14.4  11.5 - 14.5 % Final    Platelets 10/07/2024 224  150 - 450 K/uL Final    MPV 10/07/2024 9.7  9.2 - 12.9 fL Final    Immature Granulocytes 10/07/2024 0.2  0.0 - 0.5 % Final    Gran # (ANC) 10/07/2024 1.5 (L)  1.8 - 7.7 K/uL Final    Immature Grans (Abs) 10/07/2024 0.01  0.00 - 0.04 K/uL Final    Lymph # 10/07/2024 2.1  1.0 - 4.8 K/uL Final    Mono # 10/07/2024 0.4  0.3 - 1.0 K/uL Final    Eos # 10/07/2024 0.0  0.0 - 0.5 K/uL Final    Baso # 10/07/2024 0.02  0.00 - 0.20 K/uL Final    nRBC 10/07/2024 0  0 /100 WBC Final    Gran % 10/07/2024 36.4 (L)  38.0 - 73.0 % Final    Lymph % 10/07/2024 51.0 (H)  18.0 - 48.0 % Final    Mono % 10/07/2024 10.9  4.0 - 15.0 % Final    Eosinophil % 10/07/2024 1.0  0.0 - 8.0 % Final    Basophil % 10/07/2024 0.5  0.0 - 1.9 % Final    Differential Method 10/07/2024 Automated   Final    Sodium 10/07/2024 143  136 - 145 mmol/L Final    Potassium 10/07/2024 4.1  3.5 - 5.1 mmol/L Final    Chloride 10/07/2024 106  95 - 110 mmol/L Final    CO2 10/07/2024 26  23 - 29 mmol/L Final    Glucose 10/07/2024 82  70 - 110 mg/dL Final    BUN 10/07/2024 10  6 - 20 mg/dL Final    Creatinine 10/07/2024 1.1  0.5 - 1.4 mg/dL Final    Calcium 10/07/2024 9.9  8.7 - 10.5 mg/dL Final    Total Protein 10/07/2024 7.6  6.0 - 8.4 g/dL Final    Albumin 10/07/2024 4.3  3.5 - 5.2 g/dL Final    Total Bilirubin 10/07/2024 0.5  0.1 - 1.0 mg/dL Final    Alkaline Phosphatase 10/07/2024 91  55 - 135 U/L Final    AST 10/07/2024 15  10 - 40 U/L Final    ALT 10/07/2024 10  10 - 44 U/L Final    eGFR 10/07/2024 >60.0   >60 mL/min/1.73 m^2 Final    Anion Gap 10/07/2024 11  8 - 16 mmol/L Final     Imaging:  Results for orders placed or performed during the hospital encounter of 10/10/23   CT Head Without Contrast    Narrative    EXAMINATION:  CT HEAD WITHOUT CONTRAST    CLINICAL HISTORY:  Headache, chronic, new features or increased frequency;    TECHNIQUE:  Low dose axial images were obtained through the head.  Coronal and sagittal reformations were also performed. Contrast was not administered.    COMPARISON:  CT examination of the brain without contrast October 12, 2011    FINDINGS:  The ventricular system, sulcal pattern and parenchymal attenuation characteristics appear appropriate for age.  There is no evidence for intracranial mass, mass effect or midline shift.  There is no evidence for acute intracranial hemorrhage.  Appropriate CSF spaces are seen at the skull base.    There is appearance of extracranial soft tissue swelling suggested overlying the frontal region for which clinical and historical correlation is needed.  In addition overlying the right zygomatic arch as seen on series 3 axial image 16 there is a small oval finding within the extracranial soft tissues measuring up to approximately 11.6 mm in size, this measures approximately 8.9 Hounsfield units, this may relate to a small sebaceous cyst, clinical and historical correlation is needed.  The visualized orbits appear intact.  The mastoid air cells appear well aerated.  Mild paranasal sinus mucosal thickening is noted, suspected small mucous retention cyst of the left maxillary antrum noted.  The visualized osseous structures appear intact.      Impression    There is no evidence for acute intracranial process.    Extracranial soft tissue findings as discussed above.    Mild paranasal sinus findings.      Electronically signed by: Reyes Kline  Date:    10/11/2023  Time:    02:47     Note: I have independently reviewed any/all imaging/labs/tests and agree  "with the report (s) as documented.  Any discrepancies will be as noted/demarcated by free text.  LEANN MARTINEZ 11/5/2024    ASSESSMENT:  1. Chronic migraine without aura without status migrainosus, not intractable    2. Medication overuse headache          PLAN:  - Discussed symptoms appear to be consistent with migraines complicated by medication overuse ha's, differential diagnosis also includes cluster HA's. Asked pt to track for further classification however pt has not produced HA diary yet. Discussed this with patient along with treatment options and patient agreed with the following plan:  - ppx - start quilipta 60mg/d  - abortive - try ubrelvy 100mg prn  - rebound ha-  d/c ibuprofen and other otcs  - BP -  pt reports bp's are unstable at home, advised pt to monitor and inform pcp of readings, ED precautions discussed,  avodi triptans  - referral to ophtho 2/2 pt's chronic visual complaint of not being able to see anything but shapes, states he has previously seen an eye doc for this but unable to view notes, discussed ED precuations  - referral to neuro for other neuro complaints including weakness on exam, visual complaints, memory complaints, "slurred speech"  - continue mgmt of epilepsy w/ epilepsy clinic   - referral to sleep med for ?binta. Endorses loud snoring, frequent night time awakenings, and awaken w/ ha  - referral to PT 2/2 decreased neck ROM and muscle tension - pt defers  - referral to psych for pt reported stress which triggers ha's  - track ha's  - Discussed goals of therapy are to decrease the frequency, intensity, and duration of headaches  - RTC 3 mo     Orders Placed This Encounter    atogepant (QULIPTA) 60 mg Tab    ubrogepant (UBRELVY) 100 mg tablet         Questions and concerns were sought and answered to the patient's stated verbal satisfaction.  The patient verbalizes understanding and agreement with the above stated treatment plan.     CC: No, Primary Doctor      Inna George, " LEANN  [facility]   [phone]    Dr. Núñez was available during today's encounter.     Visit today included increased complexity associated with the care of the episodic problem migraines addressed and managing the longitudinal care of the patient due to the serious and/or complex managed problem(s) migraines.      I spent 48 minutes on the day of this encounter preparing for, treating and managing this patient presenting with headaches.

## 2024-11-05 NOTE — PROGRESS NOTES
SSI Referral Received- Pt naomie was approved for SSDI last month.      Thank You  [employer] - [employer]  [employer] Representative

## 2024-11-19 ENCOUNTER — OFFICE VISIT (OUTPATIENT)
Dept: NEUROLOGY | Facility: CLINIC | Age: 54
End: 2024-11-19
Payer: MEDICAID

## 2024-11-19 DIAGNOSIS — R51.9 NONINTRACTABLE HEADACHE, UNSPECIFIED CHRONICITY PATTERN, UNSPECIFIED HEADACHE TYPE: ICD-10-CM

## 2024-11-19 DIAGNOSIS — F33.1 MODERATE EPISODE OF RECURRENT MAJOR DEPRESSIVE DISORDER: Primary | ICD-10-CM

## 2024-11-19 PROCEDURE — 90837 PSYTX W PT 60 MINUTES: CPT | Mod: AJ,HB,, | Performed by: SOCIAL WORKER

## 2024-11-19 NOTE — PROGRESS NOTES
Individual Psychotherapy (PhD/LCSW)    11/19/2024    Site:  [facility]         Therapeutic Intervention: Met with patient.  Outpatient - Supportive psychotherapy 60 min - CPT Code 57767    Chief complaint/reason for encounter: depression, mood swings, and anger     Interval history and content of current session:   Patient began session by reporting that he did not have much to discuss in regards to symptoms or life stressors.  LCSW and patient did discuss election and election results.  Patient Reviewed impact on minorities and discussed racism and hate.   He acknowledged that the rich will stay the same and the poor will be poor / poorer.     He reports ongoing headaches and has been told to not take ibuprofen and other over the counter drugs due to causing rebound headaches. He finds this difficult since the prescribed meds at times do not work.  He is working with his medical team to find resolution.     Patient has made progress in recognizing anger and where other emotions contribute to anger and anger outbursts. [facility] and he reviewed and studied them.     He reports his medicaid is going to stop, but he has been approved for disability.  KEDARW previously confirmed this with SSDI department.     Patient presented as insightful, cooperative, and with a marked decrease in depression and anger. Reviewed CBT and other coping skills. Goals completed and agreed to terminate therapy at this time. Patient can reach out to LCSW and to medical team if he needs future mental health support.     Treatment plan:  Target symptoms: depression, mood swings  Why chosen therapy is appropriate versus another modality: relevant to diagnosis, patient responds to this modality, evidence based practice  Outcome monitoring methods: self-report, observation  Therapeutic intervention type: supportive psychotherapy    Risk parameters:  Patient reports no suicidal ideation  Patient reports no  homicidal ideation  Patient reports no self-injurious behavior  Patient reports no violent behavior    Verbal deficits: None    Patient's response to intervention:  The patient's response to intervention is accepting, motivated.    Progress toward goals and other mental status changes:  The patient's progress toward goals is excellent, good.    Diagnosis:     ICD-10-CM ICD-9-CM   1. Moderate episode of recurrent major depressive disorder  F33.1 296.32   2. Nonintractable headache, unspecified chronicity pattern, unspecified headache type  R51.9 784.0       Plan:  individual psychotherapy    Return to clinic: as needed    Length of Service (minutes): 60

## 2025-01-03 ENCOUNTER — TELEPHONE (OUTPATIENT)
Dept: NEUROLOGY | Facility: CLINIC | Age: 55
End: 2025-01-03
Payer: MEDICAID

## 2025-01-03 NOTE — TELEPHONE ENCOUNTER
Called patient to schedule virtual audio visit as requested, instructed patient via voicemail to reach out to schedule appointment.

## 2025-01-08 ENCOUNTER — TELEPHONE (OUTPATIENT)
Facility: CLINIC | Age: 55
End: 2025-01-08
Payer: MEDICAID

## 2025-01-08 DIAGNOSIS — G40.909 SEIZURE DISORDER: ICD-10-CM

## 2025-01-08 DIAGNOSIS — G40.319 GENERALIZED EPILEPSY, INTRACTABLE: ICD-10-CM

## 2025-01-08 RX ORDER — LACOSAMIDE 200 MG/1
200 TABLET ORAL 2 TIMES DAILY
Qty: 180 TABLET | Refills: 1 | Status: SHIPPED | OUTPATIENT
Start: 2025-01-08 | End: 2025-07-07

## 2025-02-05 ENCOUNTER — OFFICE VISIT (OUTPATIENT)
Facility: CLINIC | Age: 55
End: 2025-02-05
Payer: MEDICARE

## 2025-02-05 ENCOUNTER — LAB VISIT (OUTPATIENT)
Dept: LAB | Facility: HOSPITAL | Age: 55
End: 2025-02-05
Payer: MEDICARE

## 2025-02-05 VITALS
WEIGHT: 136.25 LBS | HEART RATE: 78 BPM | SYSTOLIC BLOOD PRESSURE: 124 MMHG | DIASTOLIC BLOOD PRESSURE: 88 MMHG | BODY MASS INDEX: 17.98 KG/M2

## 2025-02-05 DIAGNOSIS — G40.909 SEIZURE DISORDER: ICD-10-CM

## 2025-02-05 DIAGNOSIS — R41.0 CONFUSION: ICD-10-CM

## 2025-02-05 DIAGNOSIS — R63.4 WEIGHT LOSS, UNINTENTIONAL: ICD-10-CM

## 2025-02-05 DIAGNOSIS — G43.709 CHRONIC MIGRAINE WITHOUT AURA WITHOUT STATUS MIGRAINOSUS, NOT INTRACTABLE: Primary | ICD-10-CM

## 2025-02-05 DIAGNOSIS — G44.40 MEDICATION OVERUSE HEADACHE: ICD-10-CM

## 2025-02-05 LAB
BASOPHILS # BLD AUTO: 0.02 K/UL (ref 0–0.2)
BASOPHILS NFR BLD: 0.5 % (ref 0–1.9)
DIFFERENTIAL METHOD BLD: ABNORMAL
EOSINOPHIL # BLD AUTO: 0.1 K/UL (ref 0–0.5)
EOSINOPHIL NFR BLD: 1.6 % (ref 0–8)
ERYTHROCYTE [DISTWIDTH] IN BLOOD BY AUTOMATED COUNT: 14.5 % (ref 11.5–14.5)
ESTIMATED AVG GLUCOSE: 108 MG/DL (ref 68–131)
HBA1C MFR BLD: 5.4 % (ref 4–5.6)
HCT VFR BLD AUTO: 45.2 % (ref 40–54)
HGB BLD-MCNC: 14.6 G/DL (ref 14–18)
IMM GRANULOCYTES # BLD AUTO: 0 K/UL (ref 0–0.04)
IMM GRANULOCYTES NFR BLD AUTO: 0 % (ref 0–0.5)
LYMPHOCYTES # BLD AUTO: 1.8 K/UL (ref 1–4.8)
LYMPHOCYTES NFR BLD: 42.1 % (ref 18–48)
MCH RBC QN AUTO: 32.7 PG (ref 27–31)
MCHC RBC AUTO-ENTMCNC: 32.3 G/DL (ref 32–36)
MCV RBC AUTO: 101 FL (ref 82–98)
MONOCYTES # BLD AUTO: 0.4 K/UL (ref 0.3–1)
MONOCYTES NFR BLD: 8.7 % (ref 4–15)
NEUTROPHILS # BLD AUTO: 2.1 K/UL (ref 1.8–7.7)
NEUTROPHILS NFR BLD: 47.1 % (ref 38–73)
NRBC BLD-RTO: 0 /100 WBC
PLATELET # BLD AUTO: 245 K/UL (ref 150–450)
PMV BLD AUTO: 9.5 FL (ref 9.2–12.9)
RBC # BLD AUTO: 4.47 M/UL (ref 4.6–6.2)
TSH SERPL DL<=0.005 MIU/L-ACNC: 1.93 UIU/ML (ref 0.4–4)
WBC # BLD AUTO: 4.37 K/UL (ref 3.9–12.7)

## 2025-02-05 PROCEDURE — 1159F MED LIST DOCD IN RCRD: CPT | Mod: CPTII,,, | Performed by: PHYSICIAN ASSISTANT

## 2025-02-05 PROCEDURE — G2211 COMPLEX E/M VISIT ADD ON: HCPCS | Mod: S$PBB,,, | Performed by: PHYSICIAN ASSISTANT

## 2025-02-05 PROCEDURE — 84443 ASSAY THYROID STIM HORMONE: CPT | Performed by: PHYSICIAN ASSISTANT

## 2025-02-05 PROCEDURE — 83036 HEMOGLOBIN GLYCOSYLATED A1C: CPT | Performed by: PHYSICIAN ASSISTANT

## 2025-02-05 PROCEDURE — 36415 COLL VENOUS BLD VENIPUNCTURE: CPT | Performed by: PHYSICIAN ASSISTANT

## 2025-02-05 PROCEDURE — 1160F RVW MEDS BY RX/DR IN RCRD: CPT | Mod: CPTII,,, | Performed by: PHYSICIAN ASSISTANT

## 2025-02-05 PROCEDURE — 99215 OFFICE O/P EST HI 40 MIN: CPT | Mod: S$PBB,,, | Performed by: PHYSICIAN ASSISTANT

## 2025-02-05 PROCEDURE — 85025 COMPLETE CBC W/AUTO DIFF WBC: CPT | Performed by: PHYSICIAN ASSISTANT

## 2025-02-05 PROCEDURE — 80339 ANTIEPILEPTICS NOS 1-3: CPT | Performed by: PHYSICIAN ASSISTANT

## 2025-02-05 PROCEDURE — 3074F SYST BP LT 130 MM HG: CPT | Mod: CPTII,,, | Performed by: PHYSICIAN ASSISTANT

## 2025-02-05 PROCEDURE — 80203 DRUG SCREEN QUANT ZONISAMIDE: CPT | Performed by: PHYSICIAN ASSISTANT

## 2025-02-05 PROCEDURE — 3079F DIAST BP 80-89 MM HG: CPT | Mod: CPTII,,, | Performed by: PHYSICIAN ASSISTANT

## 2025-02-05 PROCEDURE — 99999 PR PBB SHADOW E&M-EST. PATIENT-LVL III: CPT | Mod: PBBFAC,,, | Performed by: PHYSICIAN ASSISTANT

## 2025-02-05 PROCEDURE — 80235 DRUG ASSAY LACOSAMIDE: CPT | Performed by: PHYSICIAN ASSISTANT

## 2025-02-05 PROCEDURE — 3008F BODY MASS INDEX DOCD: CPT | Mod: CPTII,,, | Performed by: PHYSICIAN ASSISTANT

## 2025-02-05 PROCEDURE — 99213 OFFICE O/P EST LOW 20 MIN: CPT | Mod: PBBFAC | Performed by: PHYSICIAN ASSISTANT

## 2025-02-05 NOTE — PROGRESS NOTES
Established Patient   SUBJECTIVE:  Patient ID: Irvin Meza   Chief Complaint: Follow-up    History of Present Illness:  Irvin Meza is a 55 y.o. male  with ha's, epilepsy, paroxysmal SVT, elevated BP who presents to clinic alone for follow-up of headaches.       02/05/2025 - Interval History:  Pt presents today initially confused if visit is relating to his seizures, discussed todays visit w/ HA specialist is in regards to his ha's. Further reports other complaints including confusion on administration of his seizure meds reporting he is currently taking clobazam as a HA prn. Advised pt to return clobazam to seizure usage only and as directed only. Will obtain levels of his seizure medications including clobazam, vimpat, and zonisamide to ensure correct administration. Pt also reporting concern over recent unintentional wt loss (approx 30lbs since visit in Nov) and worsening of memory complaints. Will refer again to neuro, and placed a referral to pcp to further eval. Will also obtain an MRI brain, tsh, cbc, A1c to assist w/ w/up. Recent cmp reviewed. Pt is not currently tracking ha's or treatment strategies. He does endorse continued use of ibuprofen but is unsure of frequency 2/2 memory. He did not start medications prescribed last visit including quilipta and ubrelvy, or vitamins. He has also not scheduled visit w/ sleep med as discussed last visit.   Plan: regarding his ha's, pt is to d/c ibuprofen, start quilipta daily, try ubrelvy prn. For his other complaints, will obtain labs including clobazam/zonisamide/vimpat levels, A1c, tsh, cbc, will obtain MRI brain, and place referrals to neuro, pcp, and epilepsy provider. Due to memory, pt was provided with print out of plan and handwritten instructions for each step. Pt to rtc after imaging.     11/05/2024 - Interval History:  Pt states he stopped taking verapamil as he felt it was causing ha's. He no longer takes tylenol but is taking ibuprofen daily even  "on days without ha's. He feels ha's have improved and are not occurring daily. But occur 3-4 days a week. Pain is thumping and pulsating and located in bilateral temples and bilateral-occipitalis regions with associated photophobia, phonophobia, nausea, vomiting, right sided rhinorrhea, and osmophobia. Denies all other ROS.   Discussed options w/ pt. Pt also defers PT. Discussed injectables including botox, NB's, and cgrp inhibitors but pt defers any injection options. Discussed potential interactions w/ anti-epileptics, anti-depressants, anti-hypertensives, and cgrp modulating medications. Pt wishes to avoid medications w/ potential interactions at this time.   Plan: start supplements, d/c ibuprofen and remain off otc's,   Start quilipta 60mg/d. Try ubrelvy 100mg prn. Advised continued bp monitoring and close f/ups w/ pcp. Advised attending referrals to psych, ophtho, and sleep medicine. Advised continued trigger mgmt and tracking. Rtc 3 mo    02/20/2024 - Interval History:  Pt reports ha's have improved since last visit. He no longer experiences severe ha's that require him to sit up in bed or go to the ED. Ha's are also not daily. He has been working on trigger mgmt such as decreasing caffeine intake and eating more fruits. He did not track ha's as requested last visit. But did notice that bright lights and tv screens can trigger ha's. HA's can last up to 3 days. Due to his memory, he is unsure of his current frequency. He is sure ha's are not daily, maybe up to 3 days a week. Last HA was yesterday. Has been taking verapamil daily. " I take it every day with my food, dont miss a dose, I can remmeber that." Is unsure if he tried nurtec but does not wish to continue w/ it as "I'm already taking 6 pills" and does not wish to add to it. Takes tylenol as an abortive which helps and denies taking it daily or overusing it. But then also states he takes it "like m&ms". Upon further questioning, he denies taking it  more " than 3 days in a week, more than 2 tablets at a time, more than once in a day. He also attended a PT appt and refuses to attend further as he was triggered by the perfume smells, being overheated, and walking up stairs. States he will do the stretches at home. Has been following w/ neurology for other neuro conditions which he feels is helpgin. Has not seen other referrals yet (sleep, ophtho, or psych). Also reports his bp's have remained elevated and his pcp is monitoring. Of note, he was also started on zonisamide for his seizures.   Plan: per pt wishes will increase verapamil only and rtc prn. Advised pt to limit tylenol use to avoid overuse/se's and to consider other abortives. Advised bp monitoring and continued close f/ups w/ pcp. Advised attending referrals to psych, ophtho, and sleep medicine. Advised continued trigger mgmt. Advised tracking.     Recommendations made at last Office Visit on 10/31/23:  - Discussed symptoms appear to be consistent with migraines, DDX includes cluster ha's, TACs, discussed treatment options and patient agreed with the following plan:  - pt to begin tracking ha's as he was unable to describe many features of ha's today, pt verbalizes that this will be challenging for him 2/2 memory, provided pt w/ printed ha diary's he can place at bedside/fridge, etc to assist. Also provided summary of plan on AVS given to pt today   - ppx - start verapamil 120mg/d for dual purpose of ha's   - abortive - start nurtec prn, d/c all otcs, maxalt, mobic  - BP - elevated today, advised pt to monitor and inform pcp of readings, ED precautions discussed, states he takes hydralazine only for bp, will start verapamil for dual purpose, avodi triptans  - referral to ophtho 2/2 pt's chronic visual complaint of not being able to see anything but shapes, states he has previously seen an eye doc for this but unable to view notes, discussed ED precuations  - referral to neuro for other neuro complaints  "including weakness on exam, visual complaints, memory complaints, "slurred speech"  - referral to sleep med for ?binta. Endorses loud snoring, frequent night time awakenings, and awaken w/ ha  - referral to PT 2/2 decreased neck ROM and muscle tension  - referral to psych for pt reported stress which triggers ha's  - risks, benefits, and potential side effects of verapamil, nurtec discussed   - alternative treatment options offered   - importance of healthy diet, regular exercise and sleep hygiene in the treatment of headaches    - Start tracking headaches via Migraine [facility] naomie on phone   - RTC in 2-3 mo     Treatments Tried:  Verapamil - helps  Zonisamide 300mg nightly - currently takes for epilepsy  Quilipta - has not started yet  Nurtec - unsure if he tried this or if it helped  Ubrelvy - has not tried yet  Maxalt 10mg - recalls trying this, did not help  Triptans -a void 2/2 elevated bp and h/o svt  Mobic 7.5mg bid for the last 1-2 wks - not helping per pt  Aspirin - helped in the past  Tylenol  - helped in the past    Current Medications:    Current Outpatient Medications:     atogepant (QULIPTA) 60 mg Tab, Take 1 tablet (60 mg total) by mouth once daily., Disp: 30 tablet, Rfl: 5    cloBAZam (ONFI) 20 mg Tab, Take 1 tablet (20 mg total) by mouth nightly., Disp: 30 tablet, Rfl: 5    ergocalciferol (ERGOCALCIFEROL) 50,000 unit Cap, Take 1 capsule (50,000 Units total) by mouth every 7 days. (Take 1 capsule one time per week for the next 8 weeks), Disp: 8 capsule, Rfl: 0    hydrALAZINE (APRESOLINE) 50 MG tablet, TAKE 1 TABLET BY MOUTH TWICE DAILY WITH FOOD AS NEEDED FOR BLOOD PRESSURE, Disp: , Rfl:     lacosamide (VIMPAT) 200 mg Tab tablet, Take 1 tablet (200 mg total) by mouth 2 (two) times a day., Disp: 180 tablet, Rfl: 1    sertraline (ZOLOFT) 100 MG tablet, Take 1 tablet (100 mg total) by mouth once daily., Disp: 90 tablet, Rfl: 3    ubrogepant (UBRELVY) 100 mg tablet, Take 1 tablet by mouth at the onset of a " headache. May repeat based on response and tolerability after more than 2 hours if needed. Do not take more than 200mg in a 24 hour span., Disp: 16 tablet, Rfl: 5    zonisamide (ZONEGRAN) 100 MG Cap, Take 3 capsules (300 mg total) by mouth every evening., Disp: 270 capsule, Rfl: 3    Review of Systems - as per HPI, otherwise a balanced 10 systems review is negative.    OBJECTIVE:  Vitals:  /88 (Patient Position: Sitting)   Pulse 78   Wt 61.8 kg (136 lb 3.9 oz)   BMI 17.98 kg/m²      Physical Exam:  Constitutional: he appears well-developed and well-nourished. he is well groomed. NAD   HENT:    Head: Normocephalic and atraumatic  Eyes: Conjunctivae and EOM are normal  Musculoskeletal: Normal range of motion. No joint stiffness.   Skin: Skin is warm and dry.  Psychiatric: Mood and affect are normal    Neuro: Patient is alert and oriented to person, place, and time. Language is intact and fluent. Speech is clear and fluent. Recent and remote memory are intact.  Normal attention and concentration.  Facial movement is symmetric. Moves all 4 extremities against gravity. Gait and station normal.  Cranial Nerves II through XII without focal deficit.     Review of Data:   Notes from neuro reviewed   Labs:  No visits with results within 3 Month(s) from this visit.   Latest known visit with results is:   Lab Visit on 10/07/2024   Component Date Value Ref Range Status    Lacosamide 10/07/2024 13.6 (H)  1.0 - 10.0 mcg/mL Final    Zonisamide 10/07/2024 <1.0 (L)  10 - 40 mcg/mL Final    Clobazam 10/07/2024 <10 (L)  30 - 300 ng/mL Final    Desmethylclobazam 10/07/2024 165.0 (L)  300 - 3000 ng/mL Final    Vit D, 25-Hydroxy 10/07/2024 24 (L)  30 - 96 ng/mL Final    WBC 10/07/2024 4.02  3.90 - 12.70 K/uL Final    RBC 10/07/2024 4.63  4.60 - 6.20 M/uL Final    Hemoglobin 10/07/2024 14.3  14.0 - 18.0 g/dL Final    Hematocrit 10/07/2024 46.2  40.0 - 54.0 % Final    MCV 10/07/2024 100 (H)  82 - 98 fL Final    MCH 10/07/2024 30.9   27.0 - 31.0 pg Final    MCHC 10/07/2024 31.0 (L)  32.0 - 36.0 g/dL Final    RDW 10/07/2024 14.4  11.5 - 14.5 % Final    Platelets 10/07/2024 224  150 - 450 K/uL Final    MPV 10/07/2024 9.7  9.2 - 12.9 fL Final    Immature Granulocytes 10/07/2024 0.2  0.0 - 0.5 % Final    Gran # (ANC) 10/07/2024 1.5 (L)  1.8 - 7.7 K/uL Final    Immature Grans (Abs) 10/07/2024 0.01  0.00 - 0.04 K/uL Final    Lymph # 10/07/2024 2.1  1.0 - 4.8 K/uL Final    Mono # 10/07/2024 0.4  0.3 - 1.0 K/uL Final    Eos # 10/07/2024 0.0  0.0 - 0.5 K/uL Final    Baso # 10/07/2024 0.02  0.00 - 0.20 K/uL Final    nRBC 10/07/2024 0  0 /100 WBC Final    Gran % 10/07/2024 36.4 (L)  38.0 - 73.0 % Final    Lymph % 10/07/2024 51.0 (H)  18.0 - 48.0 % Final    Mono % 10/07/2024 10.9  4.0 - 15.0 % Final    Eosinophil % 10/07/2024 1.0  0.0 - 8.0 % Final    Basophil % 10/07/2024 0.5  0.0 - 1.9 % Final    Differential Method 10/07/2024 Automated   Final    Sodium 10/07/2024 143  136 - 145 mmol/L Final    Potassium 10/07/2024 4.1  3.5 - 5.1 mmol/L Final    Chloride 10/07/2024 106  95 - 110 mmol/L Final    CO2 10/07/2024 26  23 - 29 mmol/L Final    Glucose 10/07/2024 82  70 - 110 mg/dL Final    BUN 10/07/2024 10  6 - 20 mg/dL Final    Creatinine 10/07/2024 1.1  0.5 - 1.4 mg/dL Final    Calcium 10/07/2024 9.9  8.7 - 10.5 mg/dL Final    Total Protein 10/07/2024 7.6  6.0 - 8.4 g/dL Final    Albumin 10/07/2024 4.3  3.5 - 5.2 g/dL Final    Total Bilirubin 10/07/2024 0.5  0.1 - 1.0 mg/dL Final    Alkaline Phosphatase 10/07/2024 91  55 - 135 U/L Final    AST 10/07/2024 15  10 - 40 U/L Final    ALT 10/07/2024 10  10 - 44 U/L Final    eGFR 10/07/2024 >60.0  >60 mL/min/1.73 m^2 Final    Anion Gap 10/07/2024 11  8 - 16 mmol/L Final     Imaging:  Results for orders placed or performed during the hospital encounter of 10/10/23   CT Head Without Contrast    Narrative    EXAMINATION:  CT HEAD WITHOUT CONTRAST    CLINICAL HISTORY:  Headache, chronic, new features or increased  frequency;    TECHNIQUE:  Low dose axial images were obtained through the head.  Coronal and sagittal reformations were also performed. Contrast was not administered.    COMPARISON:  CT examination of the brain without contrast October 12, 2011    FINDINGS:  The ventricular system, sulcal pattern and parenchymal attenuation characteristics appear appropriate for age.  There is no evidence for intracranial mass, mass effect or midline shift.  There is no evidence for acute intracranial hemorrhage.  Appropriate CSF spaces are seen at the skull base.    There is appearance of extracranial soft tissue swelling suggested overlying the frontal region for which clinical and historical correlation is needed.  In addition overlying the right zygomatic arch as seen on series 3 axial image 16 there is a small oval finding within the extracranial soft tissues measuring up to approximately 11.6 mm in size, this measures approximately 8.9 Hounsfield units, this may relate to a small sebaceous cyst, clinical and historical correlation is needed.  The visualized orbits appear intact.  The mastoid air cells appear well aerated.  Mild paranasal sinus mucosal thickening is noted, suspected small mucous retention cyst of the left maxillary antrum noted.  The visualized osseous structures appear intact.      Impression    There is no evidence for acute intracranial process.    Extracranial soft tissue findings as discussed above.    Mild paranasal sinus findings.      Electronically signed by: Reyes Kline  Date:    10/11/2023  Time:    02:47     Note: I have independently reviewed any/all imaging/labs/tests and agree with the report (s) as documented.  Any discrepancies will be as noted/demarcated by free text.  LEANN MARTINEZ 2/5/2025    ASSESSMENT:  1. Chronic migraine without aura without status migrainosus, not intractable    2. Medication overuse headache    3. Weight loss, unintentional    4. Seizure disorder    5. Confusion   "          PLAN:  - Discussed symptoms appear to be consistent with migraines complicated by medication overuse ha's, differential diagnosis also includes cluster HA's. Asked pt to track for further classification however pt has not produced HA diary yet. Discussed this with patient along with treatment options and patient agreed with the following plan:  - recent wt loss and worsened memory/confusion - obtain MRI brain w w/o, levels of zonisamide/clobazam/vimpat, tsh, A1c, cbc, referral to pcp, referral to neuro  - ppx - start quilipta 60mg/d  - abortive - try ubrelvy 100mg prn  - rebound ha-  d/c ibuprofen and other otcs  - BP -  pt reports bp's are unstable at home, advised pt to monitor and inform pcp of readings, ED precautions discussed,  avodi triptans  - referral to ophtho 2/2 pt's chronic visual complaint of not being able to see anything but shapes, states he has previously seen an eye doc for this but unable to view notes, discussed ED precuations  - referral to neuro for other neuro complaints including weakness on exam, visual complaints, memory complaints, "slurred speech"  - continue mgmt of epilepsy w/ epilepsy clinic, will update on todays visit  - referral to sleep med for ?binta. Endorses loud snoring, frequent night time awakenings, and awaken w/ ha  - referral to PT 2/2 decreased neck ROM and muscle tension - pt defers  - referral to psych for pt reported stress which triggers ha's  - track ha's  - Discussed goals of therapy are to decrease the frequency, intensity, and duration of headaches  - RTC after imaging     Orders Placed This Encounter    MRI Brain W WO Contrast    CBC auto differential    TSH    HEMOGLOBIN A1C    Lacosamide (Vimpat)    Clobazam level    Zonisamide level    Ambulatory referral/consult to Family Practice           Questions and concerns were sought and answered to the patient's stated verbal satisfaction.  The patient verbalizes understanding and agreement with the above " stated treatment plan.     CC: No, Primary Doctor      Inna George PA-C  [facility]   [phone]    Dr. Núñez was available during today's encounter.     Visit today included increased complexity associated with the care of the episodic problem migraines addressed and managing the longitudinal care of the patient due to the serious and/or complex managed problem(s) migraines.      I spent 52 minutes on the day of this encounter preparing for, treating and managing this patient presenting with headaches.

## 2025-02-07 LAB — ZONISAMIDE SERPL-MCNC: 17 MCG/ML (ref 10–40)

## 2025-02-08 LAB
CLOBAZAM SERPL-MCNC: <10 NG/ML (ref 30–300)
LACOSAMIDE: 18.3 MCG/ML (ref 1–10)
NORCLOBAZAM SERPL-MCNC: 3430 NG/ML (ref 300–3000)

## 2025-02-10 ENCOUNTER — OFFICE VISIT (OUTPATIENT)
Facility: CLINIC | Age: 55
End: 2025-02-10
Payer: MEDICARE

## 2025-02-10 ENCOUNTER — PATIENT OUTREACH (OUTPATIENT)
Dept: ADMINISTRATIVE | Facility: OTHER | Age: 55
End: 2025-02-10
Payer: MEDICARE

## 2025-02-10 VITALS
DIASTOLIC BLOOD PRESSURE: 70 MMHG | RESPIRATION RATE: 18 BRPM | HEART RATE: 92 BPM | WEIGHT: 135.38 LBS | SYSTOLIC BLOOD PRESSURE: 130 MMHG | TEMPERATURE: 98 F | OXYGEN SATURATION: 96 % | HEIGHT: 73 IN | BODY MASS INDEX: 17.94 KG/M2

## 2025-02-10 DIAGNOSIS — N28.9 FUNCTION KIDNEY DECREASED: ICD-10-CM

## 2025-02-10 DIAGNOSIS — R41.0 CONFUSION: ICD-10-CM

## 2025-02-10 DIAGNOSIS — Z71.2 ENCOUNTER TO DISCUSS TEST RESULTS: ICD-10-CM

## 2025-02-10 DIAGNOSIS — R63.4 WEIGHT LOSS, UNINTENTIONAL: ICD-10-CM

## 2025-02-10 DIAGNOSIS — G40.319 GENERALIZED EPILEPSY, INTRACTABLE: ICD-10-CM

## 2025-02-10 DIAGNOSIS — R56.9 SEIZURE: Primary | ICD-10-CM

## 2025-02-10 DIAGNOSIS — F17.210 CIGARETTE NICOTINE DEPENDENCE WITHOUT COMPLICATION: ICD-10-CM

## 2025-02-10 DIAGNOSIS — N18.2 CKD (CHRONIC KIDNEY DISEASE) STAGE 2, GFR 60-89 ML/MIN: ICD-10-CM

## 2025-02-10 DIAGNOSIS — G40.909 SEIZURE DISORDER: ICD-10-CM

## 2025-02-10 PROCEDURE — 99215 OFFICE O/P EST HI 40 MIN: CPT | Mod: PBBFAC,PN

## 2025-02-10 PROCEDURE — 99999 PR PBB SHADOW E&M-EST. PATIENT-LVL V: CPT | Mod: PBBFAC,,,

## 2025-02-10 NOTE — PROGRESS NOTES
CHW - Initial Contact    This Community Health Worker completed the Social Determinant of Health questionnaire with patient via telephone today.    Pt identified barriers of most importance are: Patient reported having a transportation barrier   Referrals to community agencies completed with patient/caregiver consent outside of [facility] include: No.  Referrals were put through [facility] - no:   Support and Services:   Other information discussed the patient needs / wants help with: SDOH completed. Patient reported having transportation issues. Patient stated his sister or mother usually transport him to his clinical visit. Patient advised he can no longer drive at this time. CHW confirmed with patient he has a scheduled lyft  ride through [facility] transportation dept for his clinical visit today. Patient advised he was unaware. CHW advised patient he will receive a text or call from staff when his ride is near his home. Patient advised CHW of his new insurance. CHW will set up patient future ride if he still  has Medicaid insurance  Follow up required: TBD   Case Closure - Due: 2/10/2025

## 2025-02-12 NOTE — PROGRESS NOTES
SUBJECTIVE     Chief Complaint   Patient presents with    Follow-up     On weight loss        HPI  Irvin Meza is a 55 y.o. male with multiple medical diagnoses as listed in the medical history and problem list that presents for establishment of care. Pt is new to me.    History of Present Illness    CHIEF COMPLAINT:  Mr. Meza presents today for follow up after recent hospital visit and to establish care as a new primary care patient.    SEIZURE DISORDER:  He experienced a seizure approximately one week ago. He reports two distinct types of seizures: The first involves jerking movements and loud vocalizations progressing to a full stage seizure. The second begins with leg movements and kicking, followed by loss of balance and vision changes, during which he maintains enough awareness to alert others of impending seizure. He experiences seizures during sleep, sometimes finding himself underneath the bed without recollection. Triggers include bright lights, overheating, and fragrances, leading to lifestyle modifications such as using unscented deodorant and avoiding cologne.    HEADACHES:  He experiences daily migraines that do not resolve. His neurologist diagnosed these as seizure headaches, though he notes the frequency is higher than typical seizure headaches, occurring every 2-3 days.    SLEEP:  He reports fragmented sleep pattern, only sleeping for approximately 2 hours at a time before waking up. He denies night sweats and fevers.    WEIGHT:  He reports weight loss from 167 lbs to 135-136 lbs over a three month period despite maintaining regular eating habits with full meals and frequent snacking including during nighttime hours.    MEDICATIONS:  He started Clobazam in January and takes Cosumide twice daily (morning and evening).    SOCIAL HISTORY:  He has reduced smoking from three packs to one pack per day and decreased consumption of cold drinks.      ROS:  General: -fever, -chills, -fatigue, -weight  gain, +weight loss, -night sweats  Eyes: +vision changes, -redness, -discharge  ENT: -ear pain, -nasal congestion, -sore throat  Cardiovascular: -chest pain, -palpitations, -lower extremity edema  Respiratory: -cough, -shortness of breath  Gastrointestinal: -abdominal pain, -nausea, -vomiting, -diarrhea, -constipation, -blood in stool  Genitourinary: -dysuria, -hematuria, -frequency  Musculoskeletal: -joint pain, -muscle pain  Skin: -rash, -lesion  Neurological: +headache, -dizziness, -numbness, -tingling  Psychiatric: -anxiety, -depression, +sleep difficulty         PAST MEDICAL HISTORY:  Past Medical History:   Diagnosis Date    Paroxysmal SVT (supraventricular tachycardia) 10/11/2023    Seizures        PAST SURGICAL HISTORY:  History reviewed. No pertinent surgical history.    SOCIAL HISTORY:  Social History     Socioeconomic History    Marital status: Single   Tobacco Use    Smoking status: Every Day     Current packs/day: 1.00     Types: Cigarettes    Smokeless tobacco: Never   Substance and Sexual Activity    Alcohol use: Yes     Comment: every now and then    Drug use: No    Sexual activity: Not Currently     Social Drivers of Health     Financial Resource Strain: Low Risk  (2/10/2025)    Overall Financial Resource Strain (CARDIA)     Difficulty of Paying Living Expenses: Not very hard   Food Insecurity: No Food Insecurity (2/10/2025)    Hunger Vital Sign     Worried About Running Out of Food in the Last Year: Never true     Ran Out of Food in the Last Year: Never true   Transportation Needs: Unmet Transportation Needs (2/10/2025)    PRAPARE - Transportation     Lack of Transportation (Medical): No     Lack of Transportation (Non-Medical): Yes   Physical Activity: Insufficiently Active (2/10/2025)    Exercise Vital Sign     Days of Exercise per Week: 3 days     Minutes of Exercise per Session: 30 min   Stress: Stress Concern Present (2/10/2025)    [employer] - Occupational Stress  "Questionnaire     Feeling of Stress : To some extent   Housing Stability: Unknown (2/10/2025)    Housing Stability Vital Sign     Unable to Pay for Housing in the Last Year: No     Homeless in the Last Year: No       FAMILY HISTORY:  Family History   Family history unknown: Yes       ALLERGIES AND MEDICATIONS: updated and reviewed.  Review of patient's allergies indicates:  No Known Allergies  Current Outpatient Medications   Medication Sig Dispense Refill    atogepant (QULIPTA) 60 mg Tab Take 1 tablet (60 mg total) by mouth once daily. 30 tablet 5    cloBAZam (ONFI) 20 mg Tab Take 1 tablet (20 mg total) by mouth nightly. 30 tablet 5    ergocalciferol (ERGOCALCIFEROL) 50,000 unit Cap Take 1 capsule (50,000 Units total) by mouth every 7 days. (Take 1 capsule one time per week for the next 8 weeks) 8 capsule 0    hydrALAZINE (APRESOLINE) 50 MG tablet TAKE 1 TABLET BY MOUTH TWICE DAILY WITH FOOD AS NEEDED FOR BLOOD PRESSURE      lacosamide (VIMPAT) 200 mg Tab tablet Take 1 tablet (200 mg total) by mouth 2 (two) times a day. 180 tablet 1    sertraline (ZOLOFT) 100 MG tablet Take 1 tablet (100 mg total) by mouth once daily. 90 tablet 3    ubrogepant (UBRELVY) 100 mg tablet Take 1 tablet by mouth at the onset of a headache. May repeat based on response and tolerability after more than 2 hours if needed. Do not take more than 200mg in a 24 hour span. 16 tablet 5    zonisamide (ZONEGRAN) 100 MG Cap Take 3 capsules (300 mg total) by mouth every evening. 270 capsule 3     No current facility-administered medications for this visit.     OBJECTIVE     Physical Exam  Vitals:    02/10/25 1314   BP: 130/70   Pulse: 92   Resp: 18   Temp: 98.1 °F (36.7 °C)    Body mass index is 17.86 kg/m².  Weight: 61.4 kg (135 lb 5.8 oz)   Height: 6' 1" (185.4 cm)     Physical Exam  Constitutional:       General: He is not in acute distress.     Appearance: Normal appearance. He is not ill-appearing or diaphoretic.   HENT:      Right Ear: Tympanic " membrane normal. There is no impacted cerumen.      Left Ear: Tympanic membrane normal. There is no impacted cerumen.      Nose: Nose normal. No congestion or rhinorrhea.      Mouth/Throat:      Mouth: Mucous membranes are moist.      Pharynx: Oropharynx is clear. No oropharyngeal exudate or posterior oropharyngeal erythema.   Eyes:      General:         Right eye: No discharge.         Left eye: No discharge.   Cardiovascular:      Rate and Rhythm: Normal rate and regular rhythm.      Pulses: Normal pulses.      Heart sounds: Normal heart sounds.   Pulmonary:      Effort: Pulmonary effort is normal.      Breath sounds: Normal breath sounds.   Abdominal:      General: Bowel sounds are normal. There is no distension.      Palpations: Abdomen is soft.      Tenderness: There is no abdominal tenderness. There is no guarding.   Musculoskeletal:         General: No swelling or tenderness.      Cervical back: Normal range of motion. No rigidity or tenderness.      Right lower leg: No edema.      Left lower leg: No edema.   Skin:     General: Skin is warm and dry.      Findings: No bruising, erythema, lesion or rash.   Neurological:      Mental Status: He is alert and oriented to person, place, and time.      Motor: No weakness.      Gait: Gait normal.   Psychiatric:         Mood and Affect: Mood normal.         Behavior: Behavior normal.         Health Maintenance         Date Due Completion Date    Lipid Panel Never done ---    Pneumococcal Vaccines (Age 50+) (1 of 2 - PCV) Never done ---    Colorectal Cancer Screening Never done ---    Shingles Vaccine (1 of 2) Never done ---    Influenza Vaccine (1) Never done ---    COVID-19 Vaccine (3 - 2024-25 season) 09/01/2024 12/30/2021    TETANUS VACCINE 11/20/2031 11/20/2021    RSV Vaccine (Age 60+ and Pregnant patients) (1 - 1-dose 75+ series) 01/27/2047 ---              ASSESSMENT     55 y.o. male with     1. Seizure    2. Weight loss, unintentional    3. Confusion    4.  Encounter to discuss test results    5. Function kidney decreased    6. CKD (chronic kidney disease) stage 2, GFR 60-89 ml/min    7. Cigarette nicotine dependence without complication    8. Generalized epilepsy, intractable    9. Seizure disorder        PLAN:     1. Weight loss, unintentional  New;assessing   - Ambulatory referral/consult to Family Practice    2. Confusion  New; assessing  - Ambulatory referral/consult to Family Practice    3. Seizure  New; treating    4. Encounter to discuss test results  New; addressed    5. Function kidney decreased  New; assessing  - Ambulatory referral/consult to Nephrology; Future    6. CKD (chronic kidney disease) stage 2, GFR 60-89 ml/min  New; addressing  - Ambulatory referral/consult to Nephrology; Future    7. Cigarette nicotine dependence without complication  New assessing  - Ambulatory referral/consult to Smoking Cessation Program; Future    8. Generalized epilepsy, intractable  Stable:assessing    9. Seizure disorder  Stable; treating    Assessment & Plan    IMPRESSION:  - Patient's kidney function has been decreasing over the past 5 months, warranting further investigation  - Recent weight loss of approximately 30 lbs over 3 months; considering medication side effects as a potential cause  - Seizures remain a concern, with a recent episode about 1 week ago  - Chronic headaches persist despite current management    SEIZURE DISORDER:  - Educated patient on potential side effects of seizure medications, including weight loss.  - Continued Clobazam, started in January for seizure management.  - Continued Oxcarbazepine (Trileptal), taken twice daily for seizure control.  - Mr. Meza to inform neurologist about recent weight loss and discuss potential medication side effects at next appointment.    KIDNEY FUNCTION:  - Discussed the importance of maintaining kidney health and factors that can affect kidney function.  - Referred to nephrology for evaluation of declining  kidney function.    SMOKING CESSATION:  - Mr. Meza to continue efforts to reduce cigarette smoking, aiming to decrease from current 1 pack per day to half a pack.    WEIGHT MANAGEMENT:  - Mr. Meza to maintain current dietary habits to counteract potential medication-induced weight loss.    FOLLOW UP:  - Follow up in 4 weeks, after patient's scheduled MRI and neurology appointments.         Matteo Cunningham DNP, APRN, FNP-BC  [facility]  02/12/2025 5:23 PM    This note was generated with the assistance of ambient listening technology. Verbal consent was obtained by the patient and accompanying visitor(s) for the recording of patient appointment to facilitate this note. I attest to having reviewed and edited the generated note for accuracy, though some syntax or spelling errors may persist. Please contact the author of this note for any clarification.

## 2025-02-13 ENCOUNTER — TELEPHONE (OUTPATIENT)
Facility: CLINIC | Age: 55
End: 2025-02-13
Payer: MEDICARE

## 2025-02-13 NOTE — TELEPHONE ENCOUNTER
"Called patient to clarify message. Patient needs pcp. Message was sent to wrong provider.    ----- Message from Dalila sent at 2/12/2025  1:23 PM CST -----  Regarding: pt advice  Contact: [phone]  .Name Of Caller: Self     Contact Preference?:[phone]     What is the nature of the call?:pt calling office in regards to verbally being he needing to schedule tests , pls home      Additional Notes:  "Thank you for all that you do for our patients"  "

## 2025-02-18 ENCOUNTER — HOSPITAL ENCOUNTER (OUTPATIENT)
Dept: RADIOLOGY | Facility: HOSPITAL | Age: 55
Discharge: HOME | End: 2025-02-18
Attending: PHYSICIAN ASSISTANT
Payer: MEDICARE

## 2025-02-18 DIAGNOSIS — R41.0 CONFUSION: ICD-10-CM

## 2025-02-18 DIAGNOSIS — G40.909 SEIZURE DISORDER: ICD-10-CM

## 2025-02-18 DIAGNOSIS — G43.709 CHRONIC MIGRAINE WITHOUT AURA WITHOUT STATUS MIGRAINOSUS, NOT INTRACTABLE: ICD-10-CM

## 2025-02-18 DIAGNOSIS — R63.4 WEIGHT LOSS, UNINTENTIONAL: ICD-10-CM

## 2025-02-18 PROCEDURE — 70553 MRI BRAIN STEM W/O & W/DYE: CPT | Mod: 26,,, | Performed by: RADIOLOGY

## 2025-02-18 PROCEDURE — 25500020 PHARM REV CODE 255: Performed by: PHYSICIAN ASSISTANT

## 2025-02-18 PROCEDURE — 70553 MRI BRAIN STEM W/O & W/DYE: CPT | Mod: TC

## 2025-02-18 PROCEDURE — A9585 GADOBUTROL INJECTION: HCPCS | Performed by: PHYSICIAN ASSISTANT

## 2025-02-18 RX ORDER — GADOBUTROL 604.72 MG/ML
6 INJECTION INTRAVENOUS
Status: COMPLETED | OUTPATIENT
Start: 2025-02-18 | End: 2025-02-18

## 2025-02-18 RX ADMIN — GADOBUTROL 6 ML: 604.72 INJECTION INTRAVENOUS at 08:02

## 2025-03-13 ENCOUNTER — OFFICE VISIT (OUTPATIENT)
Facility: CLINIC | Age: 55
End: 2025-03-13
Payer: MEDICARE

## 2025-03-13 ENCOUNTER — E-CONSULT (OUTPATIENT)
Dept: NEPHROLOGY | Facility: CLINIC | Age: 55
End: 2025-03-13
Payer: MEDICARE

## 2025-03-13 VITALS
OXYGEN SATURATION: 99 % | HEIGHT: 73 IN | HEART RATE: 96 BPM | TEMPERATURE: 98 F | RESPIRATION RATE: 18 BRPM | SYSTOLIC BLOOD PRESSURE: 110 MMHG | WEIGHT: 136.88 LBS | DIASTOLIC BLOOD PRESSURE: 70 MMHG | BODY MASS INDEX: 18.14 KG/M2

## 2025-03-13 DIAGNOSIS — R56.9 SEIZURE: ICD-10-CM

## 2025-03-13 DIAGNOSIS — R51.9 NONINTRACTABLE HEADACHE, UNSPECIFIED CHRONICITY PATTERN, UNSPECIFIED HEADACHE TYPE: Primary | ICD-10-CM

## 2025-03-13 DIAGNOSIS — N28.9 DECREASED RENAL FUNCTION: ICD-10-CM

## 2025-03-13 DIAGNOSIS — R94.4 KIDNEY FUNCTION TEST ABNORMAL: Primary | ICD-10-CM

## 2025-03-13 PROBLEM — R94.31 ABNORMAL EKG: Status: ACTIVE | Noted: 2023-09-12

## 2025-03-13 PROBLEM — G40.909 RECURRENT SEIZURES: Status: ACTIVE | Noted: 2022-07-03

## 2025-03-13 PROBLEM — H44.9 DISORDER OF GLOBE: Status: ACTIVE | Noted: 2025-03-13

## 2025-03-13 PROCEDURE — 99999 PR PBB SHADOW E&M-EST. PATIENT-LVL IV: CPT | Mod: PBBFAC,,,

## 2025-03-13 PROCEDURE — 99214 OFFICE O/P EST MOD 30 MIN: CPT | Mod: PBBFAC,PN

## 2025-03-13 RX ORDER — TIZANIDINE 2 MG/1
1 TABLET ORAL NIGHTLY
COMMUNITY

## 2025-03-13 RX ORDER — IBUPROFEN 800 MG/1
800 TABLET ORAL EVERY 8 HOURS
COMMUNITY
Start: 2024-11-29

## 2025-03-13 RX ORDER — HYDRALAZINE HYDROCHLORIDE 50 MG/1
1 TABLET, FILM COATED ORAL 3 TIMES DAILY
COMMUNITY

## 2025-03-13 NOTE — CONSULTS
[facility] - Nephrology [facility]  Response for E-Consult     Patient Name: Irvin Meza  MRN: [id number]  Primary Care Provider: Nayely, Primary Doctor   Requesting Provider: Matteo Cunningham DNP  Consults    Recommendation:  55-year-old male with history of chronic headaches and seizure disorder.  Nephrology has been consulted for evaluation of his laboratory studies and renal function.  On review of chemistries for the past year creatinine has been normal between 1.0 and 1.3.  There is no evidence of kidney disease or loss of renal function.  No further workup from Nephrology standpoint is required.    Additional future steps to consider:  As above    Total time of Consultation: 5 minute    I did not speak to the requesting provider verbally about this.     *This eConsult is based on the clinical data available to me and is furnished without benefit of a physical examination. The eConsult will need to be interpreted in light of any clinical issues or changes in patient status not available to me at the time of filing this eConsults. Significant changes in patient condition or level of acuity should result in immediate formal consultation and reevaluation. Please alert me if you have further questions.    Thank you for this eConsult referral.     Misael Mansfield MD  [facility]phrology [facility]

## 2025-03-13 NOTE — PROGRESS NOTES
SUBJECTIVE     Chief Complaint   Patient presents with    Follow-up     Pt states he is coming in for a f/u and check up for kidneys       HPI  Irvin Meza is a 55 y.o. male with multiple medical diagnoses as listed in the medical history and problem list that presents for follow-up    History of Present Illness    CHIEF COMPLAINT:  Mr. Meza presents today for follow up of headaches and seizures.    HEADACHES:  He experiences headaches, including episodes during sleep that can be severe enough to wake him. Ibuprofen provides effective relief. Fresh air helps reduce headache intensity, leading him to pace in hallways or walk in circles until he can go outside. He discontinued Ubrely independently and reports verapamil caused headaches.    SEIZURES:  He experiences seizures with occasional aura. During severe episodes, he becomes disoriented and reacts violently if touched. Episodes are accompanied by confusion and memory loss, such as repeatedly searching for misplaced objects. Triggers include overheating and prolonged screen time, leading him to limit TV and phone use. He denies recent severe seizures but confirms ongoing seizure activity.    IMAGING:  Most recent MRI of brain showed normal vascular flow and brain parenchyma without acute abnormalities, masses, lesions, hemorrhages, or evidence of past strokes.    CURRENT MEDICATIONS:  He takes hydralazine 50 mg 3 times daily, Lacosamide twice daily for seizures, and Clobazam nightly which helps with sleep and pain.    SOCIAL HISTORY:  He currently smokes but denies alcohol use.      ROS:  General: -fever, -chills, -fatigue, -weight gain, -weight loss  Eyes: -vision changes, -redness, -discharge  ENT: -ear pain, -nasal congestion, -sore throat  Cardiovascular: -chest pain, -palpitations, -lower extremity edema  Respiratory: -cough, -shortness of breath  Gastrointestinal: -abdominal pain, -nausea, -vomiting, +diarrhea, -constipation, -blood in  "stool  Genitourinary: -dysuria, -hematuria, -frequency  Musculoskeletal: -joint pain, -muscle pain  Skin: -rash, -lesion  Neurological: +headache, -dizziness, -numbness, -tingling, +seizure activity, +memory loss, +blackout episodes  Psychiatric: -anxiety, -depression, -sleep difficulty         PAST MEDICAL HISTORY:  Past Medical History:   Diagnosis Date    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 No pertinent surgical history.    SOCIAL HISTORY:  Social History[1]    FAMILY HISTORY:  Family History   Family history unknown: Yes       ALLERGIES AND MEDICATIONS: updated and reviewed.  Review of patient's allergies indicates:  No Known Allergies  Current Medications[2]    OBJECTIVE     Physical Exam  Vitals:    03/13/25 1505   BP: 110/70   Pulse: 96   Resp: 18   Temp: 98 °F (36.7 °C)    Body mass index is 18.06 kg/m².  Weight: 62.1 kg (136 lb 14.5 oz)   Height: 6' 1" (185.4 cm)     Physical Exam  Constitutional:       General: He is not in acute distress.     Appearance: Normal appearance. He is not ill-appearing or diaphoretic.   HENT:      Right Ear: Tympanic membrane normal. There is no impacted cerumen.      Left Ear: Tympanic membrane normal. There is no impacted cerumen.      Nose: Nose normal. No congestion or rhinorrhea.      Mouth/Throat:      Mouth: Mucous membranes are moist.      Pharynx: Oropharynx is clear. No oropharyngeal exudate or posterior oropharyngeal erythema.   Eyes:      General:         Right eye: No discharge.         Left eye: No discharge.   Cardiovascular:      Rate and Rhythm: Normal rate and regular rhythm.      Pulses: Normal pulses.      Heart sounds: Normal heart sounds.   Pulmonary:      Effort: Pulmonary effort is normal.      Breath sounds: Normal breath sounds.   Abdominal:      General: Bowel sounds are normal. There is no distension.      Palpations: Abdomen is soft.      " Tenderness: There is no abdominal tenderness. There is no guarding.   Musculoskeletal:         General: No swelling or tenderness.      Cervical back: Normal range of motion. No rigidity or tenderness.      Right lower leg: No edema.      Left lower leg: No edema.   Skin:     General: Skin is warm and dry.      Findings: No bruising, erythema, lesion or rash.   Neurological:      Mental Status: He is alert and oriented to person, place, and time.      Motor: No weakness.      Gait: Gait normal.   Psychiatric:         Mood and Affect: Mood normal.         Behavior: Behavior normal.         Health Maintenance         Date Due Completion Date    Lipid Panel Never done ---    Pneumococcal Vaccines (Age 50+) (1 of 2 - PCV) Never done ---    Colorectal Cancer Screening Never done ---    Shingles Vaccine (1 of 2) Never done ---    Influenza Vaccine (1) Never done ---    COVID-19 Vaccine (3 - 2024-25 season) 09/01/2024 12/30/2021    TETANUS VACCINE 11/20/2031 11/20/2021    RSV Vaccine (Age 60+ and Pregnant patients) (1 - 1-dose 75+ series) 01/27/2047 ---              ASSESSMENT     55 y.o. male with     1. Nonintractable headache, unspecified chronicity pattern, unspecified headache type    2. Seizure    3. Decreased renal function        PLAN:     1. Nonintractable headache, unspecified chronicity pattern, unspecified headache type  Stable; treating    2. Seizure  Stable; Treating     3. Decreased renal function  New; Assessing   - E-Consult to Nephrology    Assessment & Plan    IMPRESSION:  - Reviewed patient's GFR trend showing progressive decline  - Concerned about potential kidney function decline so will sent econsult for specialist opinion.  - Assessed recent brain MRI results: no acute abnormalities, normal vascular flow, no masses, lesions, hemorrhaging, or past strokes  - Noted ongoing issues with cluster headaches and seizures despite current treatment  - Deferred changes to neurological medications pending  upcoming neurology appointment  - Considered potential thyroid or pituitary gland involvement in patient's metabolic issues and weight loss  - Sent e-consult to nephrology for recommendations based on current medication regimen    EPILEPSY AND SEIZURES:  - Discussed MRI results, emphasizing normal findings in brain structure.  - Noted that the patient reports having seizures, including a recent one 1 week before the appointment.  - Documented that the patient experiences blackouts and can feel seizures coming on, with various symptoms during seizures including violent behavior, pulling doors, and waking up in unexpected places.  - Reviewed previous EEG monitoring results, which showed spikes on both sides of the brain.  - Continued current seizure medication regimen: Lacosamide twice daily and Clobazam nightly.  - Deferred to the neurologist for seizure management and medication adjustments.    HEADACHES:  - Noted that the patient reports ongoing headaches, described as cluster headaches, which are not responding to current treatment.  - Documented that ibuprofen is effective in relieving headaches, while previously prescribed medication was ineffective and discontinued due to side effects.  - Deferred to the neurologist for headache management and medication adjustments.  - Instructed the patient to contact the office if headaches worsen or new symptoms develop.    CHRONIC KIDNEY DISEASE:  - Evaluated recent GFR results, which are below normal range, indicating potential kidney function issues.  - Emphasized the importance of monitoring kidney function and potential risks of further decline to the patient.  - Sent an e-consult to nephrology for evaluation of declining GFR and medication review.  - Noted that the patient has a nephrology appointment scheduled for May 26th, but requested an earlier review via e-consult meanwhile, did note that patient creatinine levels on CMP have been normal.    NICOTINE  DEPENDENCE:  - Noted that the patient continues to smoke and is not ready to quit at this time.  - Acknowledged the patient's decision to continue smoking while focusing on alcohol cessation.  - Encouraged the patient to consider smoking cessation in the future when ready.  - Mr. Meza to continue abstaining from alcohol.    FOLLOW-UP:  - Scheduled follow-up after neurology appointment to review any medication changes and changes to treatment plan.  - Pt has scheduled neurology appointment on the 26th of March to discuss ongoing headache and seizure management and medication.       Matteo Cunningham DNP, APRN, FNP-BC  [facility]  03/16/2025 3:42 PM    This note was generated with the assistance of ambient listening technology. Verbal consent was obtained by the patient and accompanying visitor(s) for the recording of patient appointment to facilitate this note. I attest to having reviewed and edited the generated note for accuracy, though some syntax or spelling errors may persist. Please contact the author of this note for any clarification.            [1]   Social History  Socioeconomic History    Marital status: Single   Tobacco Use    Smoking status: Every Day     Current packs/day: 1.00     Types: Cigarettes    Smokeless tobacco: Never   Substance and Sexual Activity    Alcohol use: Yes     Comment: every now and then    Drug use: No    Sexual activity: Not Currently     Social Drivers of Health     Financial Resource Strain: Low Risk  (2/10/2025)    Overall Financial Resource Strain (CARDIA)     Difficulty of Paying Living Expenses: Not very hard   Food Insecurity: No Food Insecurity (2/10/2025)    Hunger Vital Sign     Worried About Running Out of Food in the Last Year: Never true     Ran Out of Food in the Last Year: Never true   Transportation Needs: Unmet Transportation Needs (2/10/2025)    PRAPARE - Transportation     Lack of Transportation (Medical): No     Lack of Transportation  (Non-Medical): Yes   Physical Activity: Insufficiently Active (2/10/2025)    Exercise Vital Sign     Days of Exercise per Week: 3 days     Minutes of Exercise per Session: 30 min   Stress: Stress Concern Present (2/10/2025)    [employer] [place] [employer] - Occupational Stress Questionnaire     Feeling of Stress : To some extent   Housing Stability: Unknown (2/10/2025)    Housing Stability Vital Sign     Unable to Pay for Housing in the Last Year: No     Homeless in the Last Year: No   [2]   Current Outpatient Medications   Medication Sig Dispense Refill    atogepant (QULIPTA) 60 mg Tab Take 1 tablet (60 mg total) by mouth once daily. 30 tablet 5    cloBAZam (ONFI) 20 mg Tab Take 1 tablet (20 mg total) by mouth nightly. 30 tablet 5    ergocalciferol (ERGOCALCIFEROL) 50,000 unit Cap Take 1 capsule (50,000 Units total) by mouth every 7 days. (Take 1 capsule one time per week for the next 8 weeks) 8 capsule 0    ibuprofen (ADVIL,MOTRIN) 800 MG tablet Take 800 mg by mouth every 8 (eight) hours.      lacosamide (VIMPAT) 200 mg Tab tablet Take 1 tablet (200 mg total) by mouth 2 (two) times a day. 180 tablet 1    sertraline (ZOLOFT) 100 MG tablet Take 1 tablet (100 mg total) by mouth once daily. 90 tablet 3    zonisamide (ZONEGRAN) 100 MG Cap Take 3 capsules (300 mg total) by mouth every evening. 270 capsule 3    hydrALAZINE (APRESOLINE) 50 MG tablet Take 1 tablet by mouth 3 (three) times daily.      tiZANidine (ZANAFLEX) 2 MG tablet Take 1 tablet by mouth every evening.       No current facility-administered medications for this visit.

## 2025-03-20 ENCOUNTER — TELEPHONE (OUTPATIENT)
Facility: CLINIC | Age: 55
End: 2025-03-20
Payer: MEDICARE

## 2025-03-20 NOTE — TELEPHONE ENCOUNTER
Called patient to schedule follow up appointment with Jesica, appointment scheduled 4/8/25 at 11:00. (Virtual audio call)

## 2025-03-20 NOTE — PROGRESS NOTES
"Established Patient   SUBJECTIVE:  Patient ID: Irvin Meza   Chief Complaint: Follow-up    History of Present Illness:  Irvin Meza is a 55 y.o. male  with ha's, epilepsy, paroxysmal SVT, elevated BP who presents to clinic alone for follow-up of headaches.       03/26/2025 - Interval History:  Consulted w/ his epilepsy provider and informed of lab results, will plan to see pt soon to f/up and discuss results.   Pt also obtained MRI brain which revealed mild patchy mucosal membrane thickening of paranasal sinuses, but otherwise unremarkable.   He has not scheduled an appt w/ sleep med yet.   Regarding his ha's, he endorses decreased ibuprofen use since last visit w/ only 2 uses in that time. Ha's are not daily, has some weeks/days w/o ha's but unsure of frequency. He tried "one of the medicines that you gave me" taken daily for 1 month, but unsure of the name, likely referring to quilipta. But had to self discontinue due to nausea and vomiting. He hasn't "touched" the ubrelvy as he prefers to take less medications and was concerned it would cause se's. He tried verapamil 120mg again and felt this helped during a severe ha. He would like to restart this only. Will rtc prn.     02/05/2025 - Interval History:  Pt presents today initially confused if visit is relating to his seizures, discussed todays visit w/ HA specialist is in regards to his ha's. Further reports other complaints including confusion on administration of his seizure meds reporting he is currently taking clobazam as a HA prn. Advised pt to return clobazam to seizure usage only and as directed only. Will obtain levels of his seizure medications including clobazam, vimpat, and zonisamide to ensure correct administration. Pt also reporting concern over recent unintentional wt loss (approx 30lbs since visit in Nov) and worsening of memory complaints. Will refer again to neuro, and placed a referral to pcp to further eval. Will also obtain an MRI " brain, tsh, cbc, A1c to assist w/ w/up. Recent cmp reviewed. Pt is not currently tracking ha's or treatment strategies. He does endorse continued use of ibuprofen but is unsure of frequency 2/2 memory. He did not start medications prescribed last visit including quilipta and ubrelvy, or vitamins. He has also not scheduled visit w/ sleep med as discussed last visit.   Plan: regarding his ha's, pt is to d/c ibuprofen, start quilipta daily, try ubrelvy prn. For his other complaints, will obtain labs including clobazam/zonisamide/vimpat levels, A1c, tsh, cbc, will obtain MRI brain, and place referrals to neuro, pcp, and epilepsy provider. Due to memory, pt was provided with print out of plan and handwritten instructions for each step. Pt to rtc after imaging.     11/05/2024 - Interval History:  Pt states he stopped taking verapamil as he felt it was causing ha's. He no longer takes tylenol but is taking ibuprofen daily even on days without ha's. He feels ha's have improved and are not occurring daily. But occur 3-4 days a week. Pain is thumping and pulsating and located in bilateral temples and bilateral-occipitalis regions with associated photophobia, phonophobia, nausea, vomiting, right sided rhinorrhea, and osmophobia. Denies all other ROS.   Discussed options w/ pt. Pt also defers PT. Discussed injectables including botox, NB's, and cgrp inhibitors but pt defers any injection options. Discussed potential interactions w/ anti-epileptics, anti-depressants, anti-hypertensives, and cgrp modulating medications. Pt wishes to avoid medications w/ potential interactions at this time.   Plan: start supplements, d/c ibuprofen and remain off otc's,   Start quilipta 60mg/d. Try ubrelvy 100mg prn. Advised continued bp monitoring and close f/ups w/ pcp. Advised attending referrals to psych, ophtho, and sleep medicine. Advised continued trigger mgmt and tracking. Rtc 3 mo    02/20/2024 - Interval History:  Pt reports ha's have  "improved since last visit. He no longer experiences severe ha's that require him to sit up in bed or go to the ED. Ha's are also not daily. He has been working on trigger mgmt such as decreasing caffeine intake and eating more fruits. He did not track ha's as requested last visit. But did notice that bright lights and tv screens can trigger ha's. HA's can last up to 3 days. Due to his memory, he is unsure of his current frequency. He is sure ha's are not daily, maybe up to 3 days a week. Last HA was yesterday. Has been taking verapamil daily. " I take it every day with my food, dont miss a dose, I can remmeber that." Is unsure if he tried nurtec but does not wish to continue w/ it as "I'm already taking 6 pills" and does not wish to add to it. Takes tylenol as an abortive which helps and denies taking it daily or overusing it. But then also states he takes it "like m&ms". Upon further questioning, he denies taking it  more than 3 days in a week, more than 2 tablets at a time, more than once in a day. He also attended a PT appt and refuses to attend further as he was triggered by the perfume smells, being overheated, and walking up stairs. States he will do the stretches at home. Has been following w/ neurology for other neuro conditions which he feels is helpgin. Has not seen other referrals yet (sleep, ophtho, or psych). Also reports his bp's have remained elevated and his pcp is monitoring. Of note, he was also started on zonisamide for his seizures.   Plan: per pt wishes will increase verapamil only and rtc prn. Advised pt to limit tylenol use to avoid overuse/se's and to consider other abortives. Advised bp monitoring and continued close f/ups w/ pcp. Advised attending referrals to psych, ophtho, and sleep medicine. Advised continued trigger mgmt. Advised tracking.     Recommendations made at last Office Visit on 10/31/23:  - Discussed symptoms appear to be consistent with migraines, DDX includes cluster ha's, " "TACs, discussed treatment options and patient agreed with the following plan:  - pt to begin tracking ha's as he was unable to describe many features of ha's today, pt verbalizes that this will be challenging for him 2/2 memory, provided pt w/ printed ha diary's he can place at bedside/fridge, etc to assist. Also provided summary of plan on AVS given to pt today   - ppx - start verapamil 120mg/d for dual purpose of ha's   - abortive - start nurtec prn, d/c all otcs, maxalt, mobic  - BP - elevated today, advised pt to monitor and inform pcp of readings, ED precautions discussed, states he takes hydralazine only for bp, will start verapamil for dual purpose, avodi triptans  - referral to ophtho 2/2 pt's chronic visual complaint of not being able to see anything but shapes, states he has previously seen an eye doc for this but unable to view notes, discussed ED precuations  - referral to neuro for other neuro complaints including weakness on exam, visual complaints, memory complaints, "slurred speech"  - referral to sleep med for ?binta. Endorses loud snoring, frequent night time awakenings, and awaken w/ ha  - referral to PT 2/2 decreased neck ROM and muscle tension  - referral to psych for pt reported stress which triggers ha's  - risks, benefits, and potential side effects of verapamil, nurtec discussed   - alternative treatment options offered   - importance of healthy diet, regular exercise and sleep hygiene in the treatment of headaches    - Start tracking headaches via Migraine Yaakov naomie on phone   - RTC in 2-3 mo     Treatments Tried:  Verapamil - helps  Zonisamide 300mg nightly - currently takes for epilepsy  Quilipta - ?n/v  Nurtec - unsure if he tried this or if it helped  Ubrelvy - has not tried yet  Maxalt 10mg - recalls trying this, did not help  Triptans -a void 2/2 elevated bp and h/o svt  Mobic 7.5mg bid for the last 1-2 wks - not helping per pt  Aspirin - helped in the past  Tylenol  - helped in the " "past    Current Medications:    Current Outpatient Medications:     atogepant (QULIPTA) 60 mg Tab, Take 1 tablet (60 mg total) by mouth once daily., Disp: 30 tablet, Rfl: 5    cloBAZam (ONFI) 20 mg Tab, Take 1 tablet (20 mg total) by mouth nightly., Disp: 30 tablet, Rfl: 5    ergocalciferol (ERGOCALCIFEROL) 50,000 unit Cap, Take 1 capsule (50,000 Units total) by mouth every 7 days. (Take 1 capsule one time per week for the next 8 weeks), Disp: 8 capsule, Rfl: 0    hydrALAZINE (APRESOLINE) 50 MG tablet, Take 1 tablet by mouth 3 (three) times daily., Disp: , Rfl:     ibuprofen (ADVIL,MOTRIN) 800 MG tablet, Take 800 mg by mouth every 8 (eight) hours., Disp: , Rfl:     lacosamide (VIMPAT) 200 mg Tab tablet, Take 1 tablet (200 mg total) by mouth 2 (two) times a day., Disp: 180 tablet, Rfl: 1    sertraline (ZOLOFT) 100 MG tablet, Take 1 tablet (100 mg total) by mouth once daily., Disp: 90 tablet, Rfl: 3    tiZANidine (ZANAFLEX) 2 MG tablet, Take 1 tablet by mouth every evening., Disp: , Rfl:     zonisamide (ZONEGRAN) 100 MG Cap, Take 3 capsules (300 mg total) by mouth every evening., Disp: 270 capsule, Rfl: 3    verapamiL (CALAN-SR) 120 MG CR tablet, Take 1 tablet (120 mg total) by mouth every evening., Disp: 30 tablet, Rfl: 2    Review of Systems - as per HPI, otherwise a balanced 10 systems review is negative.    OBJECTIVE:  Vitals:  /88   Pulse 97   Ht 6' 1" (1.854 m)   Wt 62.1 kg (136 lb 14.5 oz)   BMI 18.06 kg/m²      Physical Exam:  Constitutional: he appears well-developed and well-nourished. he is well groomed. NAD   HENT:    Head: Normocephalic and atraumatic  Eyes: Conjunctivae and EOM are normal  Musculoskeletal: Normal range of motion. No joint stiffness.   Skin: Skin is warm and dry.  Psychiatric: Mood and affect are normal    Neuro: Patient is alert and oriented to person, place, and time. Language is intact and fluent. Speech is clear and fluent. Recent and remote memory are intact.  Normal " attention and concentration.  Facial movement is symmetric. Moves all 4 extremities against gravity. Gait and station normal.  Cranial Nerves II through XII without focal deficit.     Review of Data:   Notes from neuro reviewed   Labs:  Lab Visit on 02/05/2025   Component Date Value Ref Range Status    WBC 02/05/2025 4.37  3.90 - 12.70 K/uL Final    RBC 02/05/2025 4.47 (L)  4.60 - 6.20 M/uL Final    Hemoglobin 02/05/2025 14.6  14.0 - 18.0 g/dL Final    Hematocrit 02/05/2025 45.2  40.0 - 54.0 % Final    MCV 02/05/2025 101 (H)  82 - 98 fL Final    MCH 02/05/2025 32.7 (H)  27.0 - 31.0 pg Final    MCHC 02/05/2025 32.3  32.0 - 36.0 g/dL Final    RDW 02/05/2025 14.5  11.5 - 14.5 % Final    Platelets 02/05/2025 245  150 - 450 K/uL Final    MPV 02/05/2025 9.5  9.2 - 12.9 fL Final    Immature Granulocytes 02/05/2025 0.0  0.0 - 0.5 % Final    Gran # (ANC) 02/05/2025 2.1  1.8 - 7.7 K/uL Final    Immature Grans (Abs) 02/05/2025 0.00  0.00 - 0.04 K/uL Final    Lymph # 02/05/2025 1.8  1.0 - 4.8 K/uL Final    Mono # 02/05/2025 0.4  0.3 - 1.0 K/uL Final    Eos # 02/05/2025 0.1  0.0 - 0.5 K/uL Final    Baso # 02/05/2025 0.02  0.00 - 0.20 K/uL Final    nRBC 02/05/2025 0  0 /100 WBC Final    Gran % 02/05/2025 47.1  38.0 - 73.0 % Final    Lymph % 02/05/2025 42.1  18.0 - 48.0 % Final    Mono % 02/05/2025 8.7  4.0 - 15.0 % Final    Eosinophil % 02/05/2025 1.6  0.0 - 8.0 % Final    Basophil % 02/05/2025 0.5  0.0 - 1.9 % Final    Differential Method 02/05/2025 Automated   Final    TSH 02/05/2025 1.932  0.400 - 4.000 uIU/mL Final    Hemoglobin A1C 02/05/2025 5.4  4.0 - 5.6 % Final    Estimated Avg Glucose 02/05/2025 108  68 - 131 mg/dL Final    Lacosamide 02/05/2025 18.3 (H)  1.0 - 10.0 mcg/mL Final    Clobazam 02/05/2025 <10 (L)  30 - 300 ng/mL Final    Desmethylclobazam 02/05/2025 3430.0 (H)  300 - 3000 ng/mL Final    Zonisamide 02/05/2025 17  10 - 40 mcg/mL Final     Imaging:  Results for orders placed or performed during the hospital  encounter of 10/10/23   CT Head Without Contrast    Narrative    EXAMINATION:  CT HEAD WITHOUT CONTRAST    CLINICAL HISTORY:  Headache, chronic, new features or increased frequency;    TECHNIQUE:  Low dose axial images were obtained through the head.  Coronal and sagittal reformations were also performed. Contrast was not administered.    COMPARISON:  CT examination of the brain without contrast October 12, 2011    FINDINGS:  The ventricular system, sulcal pattern and parenchymal attenuation characteristics appear appropriate for age.  There is no evidence for intracranial mass, mass effect or midline shift.  There is no evidence for acute intracranial hemorrhage.  Appropriate CSF spaces are seen at the skull base.    There is appearance of extracranial soft tissue swelling suggested overlying the frontal region for which clinical and historical correlation is needed.  In addition overlying the right zygomatic arch as seen on series 3 axial image 16 there is a small oval finding within the extracranial soft tissues measuring up to approximately 11.6 mm in size, this measures approximately 8.9 Hounsfield units, this may relate to a small sebaceous cyst, clinical and historical correlation is needed.  The visualized orbits appear intact.  The mastoid air cells appear well aerated.  Mild paranasal sinus mucosal thickening is noted, suspected small mucous retention cyst of the left maxillary antrum noted.  The visualized osseous structures appear intact.      Impression    There is no evidence for acute intracranial process.    Extracranial soft tissue findings as discussed above.    Mild paranasal sinus findings.      Electronically signed by: Reyes Kline  Date:    10/11/2023  Time:    02:47     Note: I have independently reviewed any/all imaging/labs/tests and agree with the report (s) as documented.  Any discrepancies will be as noted/demarcated by free text.  LEANN MARTINEZ 3/26/2025    ASSESSMENT:  1. Chronic  "migraine without aura without status migrainosus, not intractable              PLAN:  - Discussed symptoms appear to be consistent with migraines complicated by medication overuse ha's, differential diagnosis also includes cluster HA's. Asked pt to track for further classification however pt has not produced HA diary yet. Discussed this with patient along with treatment options and patient agreed with the following plan:  - restart verapamil per pt request  - abortive - try ubrelvy 100mg prn, remain off ibuprofen  - BP -  pt reports bp's are unstable at home, advised pt to monitor and inform pcp of readings, ED precautions discussed,  avodi triptans  - referral to ophtho 2/2 pt's chronic visual complaint of not being able to see anything but shapes, states he has previously seen an eye doc for this but unable to view notes, discussed ED precuations  - referral to neuro for other neuro complaints including weakness on exam, visual complaints, memory complaints, "slurred speech"  - continue mgmt of epilepsy w/ epilepsy clinic  - referral to sleep med for ?binta. Endorses loud snoring, frequent night time awakenings, and awaken w/ ha  - recent wt loss and worsened memory/confusion - obtained MRI brain w w/o, levels of zonisamide/clobazam/vimpat, tsh, A1c, cbc, referral to pcp, referral to neuro   - referral to PT 2/2 decreased neck ROM and muscle tension - pt defers  - referral to psych for pt reported stress which triggers ha's  - track ha's  - Discussed goals of therapy are to decrease the frequency, intensity, and duration of headaches  - RTC prn    Orders Placed This Encounter    verapamiL (CALAN-SR) 120 MG CR tablet       Questions and concerns were sought and answered to the patient's stated verbal satisfaction.  The patient verbalizes understanding and agreement with the above stated treatment plan.     CC: No, Primary Doctor      Inna George PA-C  [facility]   [phone]    Dr. Núñez was " available during today's encounter.

## 2025-03-24 ENCOUNTER — TELEPHONE (OUTPATIENT)
Facility: CLINIC | Age: 55
End: 2025-03-24
Payer: MEDICARE

## 2025-03-24 NOTE — TELEPHONE ENCOUNTER
Returned patient phone call to inform him of the appointment scheduled with Jesica 4/8/25 at 11:00 virtual audio.

## 2025-03-26 ENCOUNTER — OFFICE VISIT (OUTPATIENT)
Facility: CLINIC | Age: 55
End: 2025-03-26
Payer: MEDICARE

## 2025-03-26 VITALS
WEIGHT: 136.88 LBS | BODY MASS INDEX: 18.14 KG/M2 | SYSTOLIC BLOOD PRESSURE: 126 MMHG | HEART RATE: 97 BPM | DIASTOLIC BLOOD PRESSURE: 88 MMHG | HEIGHT: 73 IN

## 2025-03-26 DIAGNOSIS — G43.709 CHRONIC MIGRAINE WITHOUT AURA WITHOUT STATUS MIGRAINOSUS, NOT INTRACTABLE: Primary | ICD-10-CM

## 2025-03-26 PROCEDURE — 99213 OFFICE O/P EST LOW 20 MIN: CPT | Mod: PBBFAC | Performed by: PHYSICIAN ASSISTANT

## 2025-03-26 PROCEDURE — 99214 OFFICE O/P EST MOD 30 MIN: CPT | Mod: S$PBB,,, | Performed by: PHYSICIAN ASSISTANT

## 2025-03-26 PROCEDURE — 99999 PR PBB SHADOW E&M-EST. PATIENT-LVL III: CPT | Mod: PBBFAC,,, | Performed by: PHYSICIAN ASSISTANT

## 2025-03-26 RX ORDER — HYDROCHLOROTHIAZIDE 25 MG/1
120 TABLET ORAL NIGHTLY
Qty: 30 TABLET | Refills: 2 | Status: SHIPPED | OUTPATIENT
Start: 2025-03-26 | End: 2025-06-24

## 2025-03-26 RX ORDER — HYDROCHLOROTHIAZIDE 25 MG/1
120 TABLET ORAL NIGHTLY
Qty: 30 TABLET | Refills: 2 | Status: SHIPPED | OUTPATIENT
Start: 2025-03-26 | End: 2025-03-26

## 2025-04-08 ENCOUNTER — OFFICE VISIT (OUTPATIENT)
Dept: NEUROLOGY | Facility: CLINIC | Age: 55
End: 2025-04-08
Payer: MEDICARE

## 2025-04-08 DIAGNOSIS — G40.909 SEIZURE DISORDER: ICD-10-CM

## 2025-04-08 DIAGNOSIS — G40.319 GENERALIZED EPILEPSY, INTRACTABLE: ICD-10-CM

## 2025-04-08 NOTE — PROGRESS NOTES
Audio Only Telehealth Visit     The patient location is: home  The chief complaint leading to consultation is: epilepsy  Visit type: Virtual visit with audio only (telephone)  Total time spent in medical discussion with patient: 12 minutes  Total time spent on date of the encounter:20 minutes    The reason for the audio only service rather than synchronous audio and video virtual visit was related to technical difficulties or patient preference/necessity.    Each patient to whom I provide medical services by telemedicine is:  (1) informed of the relationship between the physician and patient and the respective role of any other health care provider with respect to management of the patient; and (2) notified that they may decline to receive medical services by telemedicine and may withdraw from such care at any time. Patient verbally consented to receive this service via voice-only telephone call.     Synchronous audio-only visit for the evaluation and management of a patient, which requires a medically appropriate history and/or examination, and medical decision making, and more than 10 minutes of medical discussion. If 10 minutes of medical discussion is exceeded, total time on the date of the encounter or medical decision making (MDM) may be used for code.        HPI:   Interval Events/ROS 4/8/2025:    Current ASM/SEs:   Lacosamide 200mg BID  Zonisamide 200mg BID  Sertraline 100mg qd   Verapamil   Ran out of clobazam, never refilled but did feel like this medication was helping  No side effects    Breakthrough seizures/events: no bad/larger seizures recently, smaller seizures - occur every 1-2 months, overall significant improvement in seizure frequency  Driving: no    No recent HA. Otherwise, no fever, no cold symptoms, no changes in vision, no new weakness, no chest pain, no shortness of breath, no nausea, no vomiting, no diarrhea, no constipation, no problems walking.    Recent Labs   Lab 05/31/22  1125  10/10/23  1749 04/15/24  1151 06/08/24  2024 06/28/24  0905 07/18/24  1123 10/07/24  1302 02/05/25  1118   Levetiracetam Lvl 21.7 13.8  --   --   --   --   --   --    Lacosamide  --   --  12.8 H 11.4 H 4.5 14.2 H 13.6 H 18.3 H   Clobazam  --   --   --   --   --  427.0 H <10 L <10 L   Desmethylclobazam  --   --   --   --   --  1520.0 165.0 L 3430.0 H   Zonisamide 2.6 L  --  <1.0 L 3.8 L <1.0 L 3.8 L <1.0 L 17         Assessment and plan:    - continue lacosamide 200mg BID and zonisamide 200mg BID   - restart clobazam 20mg qhs   - continue sertraline  - labs/levels next visit   - f/u 3 months or sooner with issues     Patient is comfortable with plan. All questions and concerns are addressed at this time.     Maria R Blankenship PA-C   Neurology-Epilepsy  [facility]    Collaborating physician, Dr. Chase Núñez, was available during today's encounter.     I spent approximately 20 minutes on the day of this encounter preparing to see the patient, obtaining and reviewing history and results, performing a medically appropriate exam, counseling and educating the patient/family/caregiver, documenting clinical information, coordinating care, and ordering medications, tests, procedures, and referrals.                        This service was not originating from a related E/M service provided within the previous 7 days nor will  to an E/M service or procedure within the next 24 hours or my soonest available appointment.  Prevailing standard of care was able to be met in this audio-only visit.

## 2025-04-25 RX ORDER — CLOBAZAM 20 MG/1
20 TABLET ORAL NIGHTLY
Qty: 30 TABLET | Refills: 5 | Status: SHIPPED | OUTPATIENT
Start: 2025-04-25 | End: 2025-10-22

## 2025-05-02 DIAGNOSIS — R94.4 KIDNEY FUNCTION TEST ABNORMAL: Primary | ICD-10-CM

## 2025-05-05 ENCOUNTER — LAB VISIT (OUTPATIENT)
Dept: LAB | Facility: HOSPITAL | Age: 55
End: 2025-05-05
Attending: INTERNAL MEDICINE
Payer: MEDICARE

## 2025-05-05 DIAGNOSIS — R94.4 KIDNEY FUNCTION TEST ABNORMAL: ICD-10-CM

## 2025-05-05 LAB
BACTERIA #/AREA URNS AUTO: ABNORMAL /HPF
BILIRUB UR QL STRIP.AUTO: NEGATIVE
CLARITY UR: CLEAR
COLOR UR AUTO: YELLOW
CREAT UR-MCNC: 346.4 MG/DL (ref 23–375)
GLUCOSE UR QL STRIP: NEGATIVE
HGB UR QL STRIP: NEGATIVE
KETONES UR QL STRIP: NEGATIVE
LEUKOCYTE ESTERASE UR QL STRIP: ABNORMAL
MICROSCOPIC COMMENT: ABNORMAL
NITRITE UR QL STRIP: NEGATIVE
PH UR STRIP: 6 [PH]
PROT UR QL STRIP: NEGATIVE
PROT UR-MCNC: 16 MG/DL
PROT/CREAT UR: 0.05 MG/G{CREAT}
RBC #/AREA URNS AUTO: 4 /HPF (ref 0–4)
SP GR UR STRIP: >=1.03
UROBILINOGEN UR STRIP-ACNC: ABNORMAL EU/DL
WBC #/AREA URNS AUTO: 47 /HPF (ref 0–5)
WBC CLUMPS UR QL AUTO: ABNORMAL

## 2025-05-05 PROCEDURE — 84156 ASSAY OF PROTEIN URINE: CPT

## 2025-05-05 PROCEDURE — 81001 URINALYSIS AUTO W/SCOPE: CPT

## 2025-05-26 ENCOUNTER — TELEPHONE (OUTPATIENT)
Dept: NEPHROLOGY | Facility: CLINIC | Age: 55
End: 2025-05-26
Payer: MEDICARE

## 2025-06-24 ENCOUNTER — OFFICE VISIT (OUTPATIENT)
Facility: CLINIC | Age: 55
End: 2025-06-24
Payer: MEDICARE

## 2025-06-24 VITALS
OXYGEN SATURATION: 97 % | BODY MASS INDEX: 18.98 KG/M2 | RESPIRATION RATE: 18 BRPM | SYSTOLIC BLOOD PRESSURE: 122 MMHG | TEMPERATURE: 98 F | WEIGHT: 143.19 LBS | DIASTOLIC BLOOD PRESSURE: 80 MMHG | HEART RATE: 92 BPM | HEIGHT: 73 IN

## 2025-06-24 DIAGNOSIS — K08.109 TOOTH LOSS: ICD-10-CM

## 2025-06-24 DIAGNOSIS — G40.909 SEIZURE DISORDER: ICD-10-CM

## 2025-06-24 DIAGNOSIS — Z71.2 ENCOUNTER TO DISCUSS TEST RESULTS: Primary | ICD-10-CM

## 2025-06-24 PROCEDURE — 99214 OFFICE O/P EST MOD 30 MIN: CPT | Mod: S$PBB,,,

## 2025-06-24 PROCEDURE — 99999 PR PBB SHADOW E&M-EST. PATIENT-LVL V: CPT | Mod: PBBFAC,,,

## 2025-06-24 PROCEDURE — G2211 COMPLEX E/M VISIT ADD ON: HCPCS | Mod: ,,,

## 2025-06-24 PROCEDURE — 99215 OFFICE O/P EST HI 40 MIN: CPT | Mod: PBBFAC,PN

## 2025-06-25 NOTE — PROGRESS NOTES
SUBJECTIVE     Chief Complaint   Patient presents with    Follow-up     Pt is coming in for a f/u        HPI  Irvin Meza is a 55 y.o. male with multiple medical diagnoses as listed in the medical history and problem list that presents for follow-up    History of Present Illness    CHIEF COMPLAINT:  Mr. Meza presents today for follow up of headaches and seizures.    SEIZURES:  He reports ongoing management of epilepsy with continued seizure activity. He experiences light seizures characterized by staring spells and wandering episodes. He can often feel seizures coming on and when anticipating a severe seizure, he typically gets on the floor for safety. He has gone almost a year without experiencing bad seizures. He identifies specific seizure triggers including bright lights, overheating, and potential sleep-related seizure activity. He experiences warning signs before seizures and will proactively position himself to prevent injury. Seizure episodes involve temporary disorientation, such as wandering and attempting to go to the bathroom without full awareness. He continues to follow up with neurologist and is actively managing his condition. He reports being more aware of his triggers and taking preventive measures to minimize seizure risk.    SLEEP ISSUES:  He reports significant long-standing sleep disturbances with a history of profound sleep disruption, previously experiencing inability to sleep for extended periods with sleep episodes lasting only approximately five minutes. He currently experiences frequent nocturnal awakenings throughout the night and loud snoring that has interfered with medical procedures. He experiences excessive daytime sleepiness and will fall asleep readily in any environment when given the opportunity. His sleep disruptions are associated with onset of headaches. He denies consistent, restorative sleep and reports feeling chronically fatigued.    WEIGHT CHANGES:  His current  weight is 143 lbs, which has increased from a previous low of 130 lbs. He notes a history of weight fluctuating between 150-167 lbs. He reports eating regularly, consuming meals prepared by family members and occasionally eating late-night foods like pizza. He emphasizes eating daily and describes his diet as consistent, expressing no concerns about current eating habits.    DENTAL ISSUES:  He reports significant dental problems and currently has no teeth, with three chipped teeth remaining in his gums located in different areas of his mouth. He expresses a need to have these three teeth removed, understanding they could potentially cause health complications. He attributes his tooth loss to previous Dilantin medication use. He is seeking dental care and is interested in finding a convenient dental provider, preferably on the [facility] to minimize travel.      ROS:  General: -fever, -chills, +fatigue, -weight gain, -weight loss, +seizure activity, +sleep disturbances, +difficulty staying asleep  Eyes: -vision changes, -redness, -discharge, +blind spots, +spots, specks or flashing lights  ENT: -ear pain, -nasal congestion, -sore throat  Cardiovascular: -chest pain, -palpitations, -lower extremity edema  Respiratory: -cough, -shortness of breath, +snoring  Gastrointestinal: -abdominal pain, -nausea, -vomiting, -diarrhea, -constipation, -blood in stool  Genitourinary: -dysuria, -hematuria, -frequency  Musculoskeletal: -joint pain, -muscle pain  Skin: -rash, -lesion  Neurological: +headache, -dizziness, -numbness, -tingling  Psychiatric: -anxiety, -depression, -sleep difficulty         PAST MEDICAL HISTORY:  Past Medical History:   Diagnosis Date    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 No pertinent surgical history.    SOCIAL HISTORY:  Social History[1]    FAMILY HISTORY:  Family History   Family history  "unknown: Yes       ALLERGIES AND MEDICATIONS: updated and reviewed.  Review of patient's allergies indicates:  No Known Allergies  Current Medications[2]        OBJECTIVE     Physical Exam  Vitals:    06/24/25 0849   BP: 122/80   Pulse: 92   Resp: 18   Temp: 98 °F (36.7 °C)    Body mass index is 18.89 kg/m².  Weight: 64.9 kg (143 lb 3 oz)   Height: 6' 1" (185.4 cm)     Physical Exam  Constitutional:       General: He is not in acute distress.     Appearance: Normal appearance. He is not ill-appearing or diaphoretic.   HENT:      Right Ear: Tympanic membrane normal. There is no impacted cerumen.      Left Ear: Tympanic membrane normal. There is no impacted cerumen.      Nose: Nose normal. No congestion or rhinorrhea.      Mouth/Throat:      Mouth: Mucous membranes are moist.      Pharynx: Oropharynx is clear. No oropharyngeal exudate or posterior oropharyngeal erythema.   Eyes:      General:         Right eye: No discharge.         Left eye: No discharge.   Cardiovascular:      Rate and Rhythm: Normal rate and regular rhythm.      Pulses: Normal pulses.      Heart sounds: Normal heart sounds.   Pulmonary:      Effort: Pulmonary effort is normal.      Breath sounds: Normal breath sounds.   Abdominal:      General: Bowel sounds are normal. There is no distension.      Palpations: Abdomen is soft.      Tenderness: There is no abdominal tenderness. There is no guarding.   Musculoskeletal:         General: No swelling or tenderness.      Cervical back: Normal range of motion. No rigidity or tenderness.      Right lower leg: No edema.      Left lower leg: No edema.   Skin:     General: Skin is warm and dry.      Findings: No bruising, erythema, lesion or rash.   Neurological:      Mental Status: He is alert and oriented to person, place, and time.      Motor: No weakness.      Gait: Gait normal.   Psychiatric:         Mood and Affect: Mood normal.         Behavior: Behavior normal.         Health Maintenance         Date " "Due Completion Date    Lipid Panel Never done ---    Pneumococcal Vaccines (Age 50+) (1 of 2 - PCV) Never done ---    Colorectal Cancer Screening Never done ---    Shingles Vaccine (1 of 2) Never done ---    COVID-19 Vaccine (3 - 2024-25 season) 09/01/2024 12/30/2021    Influenza Vaccine (Season Ended) 09/01/2025 ---    TETANUS VACCINE 11/20/2031 11/20/2021    RSV Vaccine (Age 60+ and Pregnant patients) (1 - 1-dose 75+ series) 01/27/2047 ---              ASSESSMENT     55 y.o. male with     1. Encounter to discuss test results    2. Loud snoring    3. Seizure disorder    4. Tooth loss        PLAN:     1. Encounter to discuss test results  Discussed; Addressed      2. Seizure disorder  Stable; treating    3. Tooth loss  New; Addressing  - Ambulatory referral/consult to Dentistry ([place]); Future    Assessment & Plan    G40.A09 Absence epileptic syndrome, not intractable, without status epilepticus  G47.33 Obstructive sleep apnea (adult) (pediatric)  G47.13 Recurrent hypersomnia  R51.9 Headache, unspecified  K08.401 Partial loss of teeth, unspecified cause, class I  K08.3 Retained dental root  Z91.198 Patient's noncompliance with other medical treatment and regimen for other reason    ABSENCE EPILEPTIC SYNDROME:  - Monitored the patient's seizure activity, noting absence seizures continue but no recent "bad" seizures.  - Mr. Meza reports light seizures and blackouts with symptoms such as pulling on doors and not seeing anything during episodes.  - Identified bright lights as a trigger, and patient avoids staring at TVs and phones to prevent seizure episodes.  - Seizures are more controlled now compared to previously.  - Advised to continue management with the epilepsy clinic.  - Continued Valium therapy, which effectively manages both headaches and seizures.     RECURRENT HYPERSOMNIA:  - Monitored the patient's reports of excessive daytime somnolence, difficulty maintaining wakefulness, and sleeping whenever " possible.  - Mr. Meza declined sleep study referral despite these symptoms.    HEADACHE:  - Monitored the patient's reports of daily headaches, sometimes associated with seizure activity.  - Noted the association between headaches and seizure episodes.  - Valium therapy effectively manages both conditions.  - Advised to continue management with the neurologist and epilepsy clinic.    PARTIAL LOSS OF TEETH AND RETAINED DENTAL ROOT:  - Monitored the patient's edentulous status with reports of retained dental root fragments in the gingiva.  - Patient states previous dental radiographs show 3 retained dental roots requiring extraction.  - Patient advised by previous dentist to have these roots removed before denture fabrication.  - patient reports that he was enrolled in a program to cover expenses for dentures and dental care.  - Instructed patient to bring details of new Medicare supplemental program covering dental and vision benefits at next visit.  - placed referral to dental    PATIENT'S NONCOMPLIANCE WITH MEDICAL TREATMENT:  - Documented patient's unwillingness to use CPAP therapy despite medical recommendations after sleep evaluation in past.  - Mr. Meza is aware of CPAP recommendations but has declined treatment and sleep study referral despite reported symptoms of loud snoring and daytime fatigue.  - Advised to reconsider using a CPAP mask for improved sleep quality and respiratory function.    OTHER MEDICAL FINDINGS:  - Reviewed nephrology E-consult notes indicating normal creatinine levels and no evidence of renal disease or loss in renal function.  - Weight increased from previous low of 130 lbs to current 143 lbs.    FOLLOW-UP:  - Follow up on July 2nd as scheduled.   -follow up sooner if needed      I spent a total of 32 minutes on the day of the visit.This includes face to face time and non-face to face time preparing to see the patient (eg, review of tests), obtaining and/or reviewing separately  obtained history, documenting clinical information in the electronic or other health record, independently interpreting results and communicating results to the patient/family/caregiver, or care coordinator.       Matteo Cunningham DNP, APRN, FNP-BC  [facility]  06/25/2025 5:53 PM    This note was generated with the assistance of ambient listening technology. Verbal consent was obtained by the patient and accompanying visitor(s) for the recording of patient appointment to facilitate this note. I attest to having reviewed and edited the generated note for accuracy, though some syntax or spelling errors may persist. Please contact the author of this note for any clarification.            [1]   Social History  Socioeconomic History    Marital status: Single   Tobacco Use    Smoking status: Every Day     Current packs/day: 1.00     Types: Cigarettes    Smokeless tobacco: Never   Substance and Sexual Activity    Alcohol use: Yes     Comment: every now and then    Drug use: No    Sexual activity: Not Currently     Social Drivers of Health     Financial Resource Strain: Low Risk  (2/10/2025)    Overall Financial Resource Strain (CARDIA)     Difficulty of Paying Living Expenses: Not very hard   Food Insecurity: No Food Insecurity (2/10/2025)    Hunger Vital Sign     Worried About Running Out of Food in the Last Year: Never true     Ran Out of Food in the Last Year: Never true   Transportation Needs: Unmet Transportation Needs (2/10/2025)    PRAPARE - Transportation     Lack of Transportation (Medical): No     Lack of Transportation (Non-Medical): Yes   Physical Activity: Insufficiently Active (2/10/2025)    Exercise Vital Sign     Days of Exercise per Week: 3 days     Minutes of Exercise per Session: 30 min   Stress: Stress Concern Present (2/10/2025)    [employer] - Occupational Stress Questionnaire     Feeling of Stress : To some extent   Housing Stability: Unknown (2/10/2025)     Housing Stability Vital Sign     Unable to Pay for Housing in the Last Year: No     Homeless in the Last Year: No   [2]   Current Outpatient Medications   Medication Sig Dispense Refill    cloBAZam (ONFI) 20 mg Tab Take 1 tablet (20 mg total) by mouth nightly. 30 tablet 5    ergocalciferol (ERGOCALCIFEROL) 50,000 unit Cap Take 1 capsule (50,000 Units total) by mouth every 7 days. (Take 1 capsule one time per week for the next 8 weeks) 8 capsule 0    hydrALAZINE (APRESOLINE) 50 MG tablet Take 1 tablet by mouth 3 (three) times daily.      ibuprofen (ADVIL,MOTRIN) 800 MG tablet Take 800 mg by mouth every 8 (eight) hours.      lacosamide (VIMPAT) 200 mg Tab tablet Take 1 tablet (200 mg total) by mouth 2 (two) times a day. 180 tablet 1    sertraline (ZOLOFT) 100 MG tablet Take 1 tablet (100 mg total) by mouth once daily. 90 tablet 3    tiZANidine (ZANAFLEX) 2 MG tablet Take 1 tablet by mouth every evening.      verapamiL (CALAN-SR) 120 MG CR tablet Take 1 tablet (120 mg total) by mouth every evening. 30 tablet 2    zonisamide (ZONEGRAN) 100 MG Cap Take 3 capsules (300 mg total) by mouth every evening. 270 capsule 3     No current facility-administered medications for this visit.

## 2025-06-27 DIAGNOSIS — N18.30 STAGE 3 CHRONIC KIDNEY DISEASE, UNSPECIFIED WHETHER STAGE 3A OR 3B CKD: ICD-10-CM

## 2025-06-27 DIAGNOSIS — R94.4 KIDNEY FUNCTION TEST ABNORMAL: Primary | ICD-10-CM

## 2025-06-27 DIAGNOSIS — N25.81 SECONDARY HYPERPARATHYROIDISM OF RENAL ORIGIN: ICD-10-CM

## 2025-07-08 DIAGNOSIS — G43.709 CHRONIC MIGRAINE WITHOUT AURA WITHOUT STATUS MIGRAINOSUS, NOT INTRACTABLE: ICD-10-CM

## 2025-07-10 RX ORDER — HYDROCHLOROTHIAZIDE 25 MG/1
120 TABLET ORAL NIGHTLY
Qty: 30 TABLET | Refills: 2 | Status: SHIPPED | OUTPATIENT
Start: 2025-07-10 | End: 2025-10-08